# Patient Record
Sex: MALE | Race: WHITE | NOT HISPANIC OR LATINO | Employment: OTHER | URBAN - METROPOLITAN AREA
[De-identification: names, ages, dates, MRNs, and addresses within clinical notes are randomized per-mention and may not be internally consistent; named-entity substitution may affect disease eponyms.]

---

## 2017-02-07 ENCOUNTER — ALLSCRIPTS OFFICE VISIT (OUTPATIENT)
Dept: OTHER | Facility: OTHER | Age: 72
End: 2017-02-07

## 2017-02-07 DIAGNOSIS — Z13.6 ENCOUNTER FOR SCREENING FOR CARDIOVASCULAR DISORDERS: ICD-10-CM

## 2017-02-07 DIAGNOSIS — Z12.5 ENCOUNTER FOR SCREENING FOR MALIGNANT NEOPLASM OF PROSTATE: ICD-10-CM

## 2017-02-07 DIAGNOSIS — I10 ESSENTIAL (PRIMARY) HYPERTENSION: ICD-10-CM

## 2017-02-15 ENCOUNTER — APPOINTMENT (OUTPATIENT)
Dept: LAB | Facility: HOSPITAL | Age: 72
End: 2017-02-15
Attending: FAMILY MEDICINE
Payer: COMMERCIAL

## 2017-02-15 ENCOUNTER — TRANSCRIBE ORDERS (OUTPATIENT)
Dept: ADMINISTRATIVE | Facility: HOSPITAL | Age: 72
End: 2017-02-15

## 2017-02-15 DIAGNOSIS — I10 ESSENTIAL (PRIMARY) HYPERTENSION: ICD-10-CM

## 2017-02-15 DIAGNOSIS — Z13.6 ENCOUNTER FOR SCREENING FOR CARDIOVASCULAR DISORDERS: ICD-10-CM

## 2017-02-15 DIAGNOSIS — Z12.5 ENCOUNTER FOR SCREENING FOR MALIGNANT NEOPLASM OF PROSTATE: ICD-10-CM

## 2017-02-15 LAB
ALBUMIN SERPL BCP-MCNC: 3.6 G/DL (ref 3.5–5)
ALP SERPL-CCNC: 72 U/L (ref 46–116)
ALT SERPL W P-5'-P-CCNC: 38 U/L (ref 12–78)
ANION GAP SERPL CALCULATED.3IONS-SCNC: 10 MMOL/L (ref 4–13)
AST SERPL W P-5'-P-CCNC: 25 U/L (ref 5–45)
BILIRUB SERPL-MCNC: 0.8 MG/DL (ref 0.2–1)
BUN SERPL-MCNC: 17 MG/DL (ref 5–25)
CALCIUM SERPL-MCNC: 8.7 MG/DL (ref 8.3–10.1)
CHLORIDE SERPL-SCNC: 102 MMOL/L (ref 100–108)
CHOLEST SERPL-MCNC: 177 MG/DL (ref 50–200)
CO2 SERPL-SCNC: 28 MMOL/L (ref 21–32)
CREAT SERPL-MCNC: 1.06 MG/DL (ref 0.6–1.3)
ERYTHROCYTE [DISTWIDTH] IN BLOOD BY AUTOMATED COUNT: 13.2 % (ref 11.6–15.1)
GFR SERPL CREATININE-BSD FRML MDRD: >60 ML/MIN/1.73SQ M
GLUCOSE SERPL-MCNC: 120 MG/DL (ref 65–140)
HCT VFR BLD AUTO: 52.1 % (ref 36.5–49.3)
HDLC SERPL-MCNC: 39 MG/DL (ref 40–60)
HGB BLD-MCNC: 17.2 G/DL (ref 12–17)
LDLC SERPL CALC-MCNC: 107 MG/DL (ref 0–100)
MCH RBC QN AUTO: 31.5 PG (ref 26.8–34.3)
MCHC RBC AUTO-ENTMCNC: 33 G/DL (ref 31.4–37.4)
MCV RBC AUTO: 95 FL (ref 82–98)
PLATELET # BLD AUTO: 236 THOUSANDS/UL (ref 149–390)
PMV BLD AUTO: 9 FL (ref 8.9–12.7)
POTASSIUM SERPL-SCNC: 3.8 MMOL/L (ref 3.5–5.3)
PROT SERPL-MCNC: 7.7 G/DL (ref 6.4–8.2)
PSA SERPL-MCNC: 1 NG/ML (ref 0–4)
RBC # BLD AUTO: 5.46 MILLION/UL (ref 3.88–5.62)
SODIUM SERPL-SCNC: 140 MMOL/L (ref 136–145)
TRIGL SERPL-MCNC: 155 MG/DL
WBC # BLD AUTO: 9.24 THOUSAND/UL (ref 4.31–10.16)

## 2017-02-15 PROCEDURE — 85027 COMPLETE CBC AUTOMATED: CPT

## 2017-02-15 PROCEDURE — 80053 COMPREHEN METABOLIC PANEL: CPT

## 2017-02-15 PROCEDURE — 36415 COLL VENOUS BLD VENIPUNCTURE: CPT

## 2017-02-15 PROCEDURE — 80061 LIPID PANEL: CPT

## 2017-02-15 PROCEDURE — G0103 PSA SCREENING: HCPCS

## 2017-02-16 ENCOUNTER — GENERIC CONVERSION - ENCOUNTER (OUTPATIENT)
Dept: OTHER | Facility: OTHER | Age: 72
End: 2017-02-16

## 2017-03-07 ENCOUNTER — ALLSCRIPTS OFFICE VISIT (OUTPATIENT)
Dept: OTHER | Facility: OTHER | Age: 72
End: 2017-03-07

## 2017-04-24 ENCOUNTER — TRANSCRIBE ORDERS (OUTPATIENT)
Dept: ADMINISTRATIVE | Facility: HOSPITAL | Age: 72
End: 2017-04-24

## 2017-04-24 DIAGNOSIS — J32.8 OTHER CHRONIC SINUSITIS: Primary | ICD-10-CM

## 2017-04-27 ENCOUNTER — HOSPITAL ENCOUNTER (OUTPATIENT)
Dept: RADIOLOGY | Facility: HOSPITAL | Age: 72
Discharge: HOME | End: 2017-04-27
Payer: COMMERCIAL

## 2017-04-27 ENCOUNTER — HOSPITAL ENCOUNTER (OUTPATIENT)
Dept: CT IMAGING | Facility: HOSPITAL | Age: 72
Discharge: HOME | End: 2017-04-27
Payer: COMMERCIAL

## 2017-04-27 ENCOUNTER — TRANSCRIBE ORDERS (OUTPATIENT)
Dept: ADMINISTRATIVE | Facility: HOSPITAL | Age: 72
End: 2017-04-27

## 2017-04-27 DIAGNOSIS — J32.8 OTHER CHRONIC SINUSITIS: ICD-10-CM

## 2017-04-27 DIAGNOSIS — R05.9 COUGH: ICD-10-CM

## 2017-04-27 DIAGNOSIS — R05.9 COUGH: Primary | ICD-10-CM

## 2017-04-27 PROCEDURE — 71020 HB CHEST X-RAY 2VW FRONTAL&LATL: CPT

## 2017-04-27 PROCEDURE — 70486 CT MAXILLOFACIAL W/O DYE: CPT

## 2017-05-04 ENCOUNTER — TRANSCRIBE ORDERS (OUTPATIENT)
Dept: ADMINISTRATIVE | Facility: HOSPITAL | Age: 72
End: 2017-05-04

## 2017-05-04 DIAGNOSIS — R05.9 COUGH: Primary | ICD-10-CM

## 2017-05-17 ENCOUNTER — APPOINTMENT (OUTPATIENT)
Dept: LAB | Facility: HOSPITAL | Age: 72
End: 2017-05-17
Payer: COMMERCIAL

## 2017-05-17 ENCOUNTER — HOSPITAL ENCOUNTER (OUTPATIENT)
Dept: CT IMAGING | Facility: HOSPITAL | Age: 72
Discharge: HOME | End: 2017-05-17
Payer: COMMERCIAL

## 2017-05-17 ENCOUNTER — TRANSCRIBE ORDERS (OUTPATIENT)
Dept: ADMINISTRATIVE | Facility: HOSPITAL | Age: 72
End: 2017-05-17

## 2017-05-17 DIAGNOSIS — R05.9 COUGH: Primary | ICD-10-CM

## 2017-05-17 DIAGNOSIS — R05.9 COUGH: ICD-10-CM

## 2017-05-17 LAB
ANION GAP SERPL CALCULATED.3IONS-SCNC: 10 MMOL/L (ref 4–13)
BASOPHILS # BLD AUTO: 0.02 THOUSANDS/ΜL (ref 0–0.1)
BASOPHILS NFR BLD AUTO: 0 % (ref 0–1)
BUN SERPL-MCNC: 18 MG/DL (ref 5–25)
CALCIUM SERPL-MCNC: 9.3 MG/DL (ref 8.3–10.1)
CHLORIDE SERPL-SCNC: 105 MMOL/L (ref 100–108)
CO2 SERPL-SCNC: 27 MMOL/L (ref 21–32)
CREAT SERPL-MCNC: 0.84 MG/DL (ref 0.6–1.3)
EOSINOPHIL # BLD AUTO: 0.08 THOUSAND/ΜL (ref 0–0.61)
EOSINOPHIL NFR BLD AUTO: 1 % (ref 0–6)
ERYTHROCYTE [DISTWIDTH] IN BLOOD BY AUTOMATED COUNT: 13.6 % (ref 11.6–15.1)
GFR SERPL CREATININE-BSD FRML MDRD: >60 ML/MIN/1.73SQ M
GLUCOSE P FAST SERPL-MCNC: 159 MG/DL (ref 65–99)
HCT VFR BLD AUTO: 49.8 % (ref 36.5–49.3)
HGB BLD-MCNC: 16.7 G/DL (ref 12–17)
LYMPHOCYTES # BLD AUTO: 2.16 THOUSANDS/ΜL (ref 0.6–4.47)
LYMPHOCYTES NFR BLD AUTO: 33 % (ref 14–44)
MCH RBC QN AUTO: 31.8 PG (ref 26.8–34.3)
MCHC RBC AUTO-ENTMCNC: 33.5 G/DL (ref 31.4–37.4)
MCV RBC AUTO: 95 FL (ref 82–98)
MONOCYTES # BLD AUTO: 0.7 THOUSAND/ΜL (ref 0.17–1.22)
MONOCYTES NFR BLD AUTO: 11 % (ref 4–12)
NEUTROPHILS # BLD AUTO: 3.57 THOUSANDS/ΜL (ref 1.85–7.62)
NEUTS SEG NFR BLD AUTO: 55 % (ref 43–75)
NRBC BLD AUTO-RTO: 0 /100 WBCS
PLATELET # BLD AUTO: 239 THOUSANDS/UL (ref 149–390)
PMV BLD AUTO: 9.6 FL (ref 8.9–12.7)
POTASSIUM SERPL-SCNC: 3.4 MMOL/L (ref 3.5–5.3)
RBC # BLD AUTO: 5.25 MILLION/UL (ref 3.88–5.62)
SODIUM SERPL-SCNC: 142 MMOL/L (ref 136–145)
WBC # BLD AUTO: 6.55 THOUSAND/UL (ref 4.31–10.16)

## 2017-05-17 PROCEDURE — 80048 BASIC METABOLIC PNL TOTAL CA: CPT

## 2017-05-17 PROCEDURE — 71250 CT THORAX DX C-: CPT

## 2017-05-17 PROCEDURE — 36415 COLL VENOUS BLD VENIPUNCTURE: CPT

## 2017-05-17 PROCEDURE — 85025 COMPLETE CBC W/AUTO DIFF WBC: CPT

## 2017-05-17 PROCEDURE — 86615 BORDETELLA ANTIBODY: CPT

## 2017-05-19 LAB
B PERT IGA SER QL IA: <1 INDEX (ref 0–0.9)
B PERT IGG SER-ACNC: 1.32 INDEX (ref 0–0.94)
B PERT IGM SER QL IA: <1 INDEX (ref 0–0.9)

## 2017-06-01 ENCOUNTER — TRANSCRIBE ORDERS (OUTPATIENT)
Dept: ADMINISTRATIVE | Facility: HOSPITAL | Age: 72
End: 2017-06-01

## 2017-06-01 ENCOUNTER — ALLSCRIPTS OFFICE VISIT (OUTPATIENT)
Dept: OTHER | Facility: OTHER | Age: 72
End: 2017-06-01

## 2017-06-01 DIAGNOSIS — R05.9 COUGH: Primary | ICD-10-CM

## 2017-06-08 ENCOUNTER — HOSPITAL ENCOUNTER (OUTPATIENT)
Dept: PULMONOLOGY | Facility: HOSPITAL | Age: 72
Discharge: HOME | End: 2017-06-08
Attending: INTERNAL MEDICINE
Payer: COMMERCIAL

## 2017-06-08 DIAGNOSIS — R05.9 COUGH: ICD-10-CM

## 2017-06-08 PROCEDURE — 94729 DIFFUSING CAPACITY: CPT

## 2017-06-08 PROCEDURE — 94726 PLETHYSMOGRAPHY LUNG VOLUMES: CPT

## 2017-06-08 PROCEDURE — 94760 N-INVAS EAR/PLS OXIMETRY 1: CPT

## 2017-06-08 PROCEDURE — 94070 EVALUATION OF WHEEZING: CPT

## 2017-06-08 RX ORDER — ALBUTEROL SULFATE 2.5 MG/3ML
2.5 SOLUTION RESPIRATORY (INHALATION) ONCE AS NEEDED
Status: DISCONTINUED | OUTPATIENT
Start: 2017-06-08 | End: 2017-06-12 | Stop reason: HOSPADM

## 2017-06-08 RX ORDER — ALBUTEROL SULFATE 2.5 MG/3ML
2.5 SOLUTION RESPIRATORY (INHALATION) ONCE
Status: COMPLETED | OUTPATIENT
Start: 2017-06-08 | End: 2017-06-08

## 2017-06-08 RX ADMIN — ALBUTEROL SULFATE 2.5 MG: 2.5 SOLUTION RESPIRATORY (INHALATION) at 10:49

## 2017-06-08 RX ADMIN — METHACHOLINE CHLORIDE 5 BREATH: 100 POWDER, FOR SOLUTION RESPIRATORY (INHALATION) at 10:46

## 2017-06-21 ENCOUNTER — ALLSCRIPTS OFFICE VISIT (OUTPATIENT)
Dept: OTHER | Facility: OTHER | Age: 72
End: 2017-06-21

## 2017-08-02 ENCOUNTER — ALLSCRIPTS OFFICE VISIT (OUTPATIENT)
Dept: OTHER | Facility: OTHER | Age: 72
End: 2017-08-02

## 2017-08-02 ENCOUNTER — TRANSCRIBE ORDERS (OUTPATIENT)
Dept: ADMINISTRATIVE | Facility: HOSPITAL | Age: 72
End: 2017-08-02

## 2017-08-02 DIAGNOSIS — M54.81 OCCIPITAL NEURALGIA: ICD-10-CM

## 2017-08-02 DIAGNOSIS — Z12.11 ENCOUNTER FOR SCREENING FOR MALIGNANT NEOPLASM OF COLON: ICD-10-CM

## 2017-08-02 DIAGNOSIS — M54.81 OCCIPITAL NEURALGIA, UNSPECIFIED LATERALITY: Primary | ICD-10-CM

## 2017-08-22 ENCOUNTER — HOSPITAL ENCOUNTER (OUTPATIENT)
Dept: MRI IMAGING | Facility: HOSPITAL | Age: 72
Discharge: HOME | End: 2017-08-22
Attending: PSYCHIATRY & NEUROLOGY
Payer: COMMERCIAL

## 2017-08-22 DIAGNOSIS — M54.81 OCCIPITAL NEURALGIA: ICD-10-CM

## 2017-08-22 PROCEDURE — A9585 GADOBUTROL INJECTION: HCPCS | Performed by: PSYCHIATRY & NEUROLOGY

## 2017-08-22 PROCEDURE — 70553 MRI BRAIN STEM W/O & W/DYE: CPT

## 2017-08-22 PROCEDURE — 72156 MRI NECK SPINE W/O & W/DYE: CPT

## 2017-08-22 RX ADMIN — GADOBUTROL 9 ML: 604.72 INJECTION INTRAVENOUS at 16:07

## 2017-08-30 ENCOUNTER — GENERIC CONVERSION - ENCOUNTER (OUTPATIENT)
Dept: OTHER | Facility: OTHER | Age: 72
End: 2017-08-30

## 2017-09-11 ENCOUNTER — TRANSCRIBE ORDERS (OUTPATIENT)
Dept: ADMINISTRATIVE | Facility: HOSPITAL | Age: 72
End: 2017-09-11

## 2017-09-11 ENCOUNTER — GENERIC CONVERSION - ENCOUNTER (OUTPATIENT)
Dept: OTHER | Facility: OTHER | Age: 72
End: 2017-09-11

## 2017-09-11 DIAGNOSIS — M48.02 SPINAL STENOSIS IN CERVICAL REGION: Primary | ICD-10-CM

## 2017-09-11 DIAGNOSIS — M48.02 SPINAL STENOSIS OF CERVICAL REGION: ICD-10-CM

## 2017-09-15 ENCOUNTER — HOSPITAL ENCOUNTER (OUTPATIENT)
Dept: RADIOLOGY | Facility: HOSPITAL | Age: 72
Discharge: HOME | End: 2017-09-15
Payer: COMMERCIAL

## 2017-09-15 ENCOUNTER — HOSPITAL ENCOUNTER (OUTPATIENT)
Dept: CT IMAGING | Facility: HOSPITAL | Age: 72
Discharge: HOME | End: 2017-09-15
Payer: COMMERCIAL

## 2017-09-15 DIAGNOSIS — M48.02 SPINAL STENOSIS OF CERVICAL REGION: ICD-10-CM

## 2017-09-15 DIAGNOSIS — M48.02 SPINAL STENOSIS IN CERVICAL REGION: ICD-10-CM

## 2017-09-15 PROCEDURE — 72125 CT NECK SPINE W/O DYE: CPT

## 2017-09-15 PROCEDURE — 72050 X-RAY EXAM NECK SPINE 4/5VWS: CPT

## 2017-09-25 ENCOUNTER — GENERIC CONVERSION - ENCOUNTER (OUTPATIENT)
Dept: OTHER | Facility: OTHER | Age: 72
End: 2017-09-25

## 2017-10-20 ENCOUNTER — GENERIC CONVERSION - ENCOUNTER (OUTPATIENT)
Dept: OTHER | Facility: OTHER | Age: 72
End: 2017-10-20

## 2017-11-15 ENCOUNTER — GENERIC CONVERSION - ENCOUNTER (OUTPATIENT)
Dept: OTHER | Facility: OTHER | Age: 72
End: 2017-11-15

## 2017-11-21 ENCOUNTER — ALLSCRIPTS OFFICE VISIT (OUTPATIENT)
Dept: OTHER | Facility: OTHER | Age: 72
End: 2017-11-21

## 2017-11-22 NOTE — PROGRESS NOTES
Assessment    1  Benign essential hypertension (401 1) (I10)   2  Headache (784 0) (R51)    Plan  Benign essential hypertension    · AmLODIPine Besylate 5 MG Oral Tablet; TAKE ONE TABLET BY MOUTH ONCEDAILY   · Follow-up visit in 6 weeks Evaluation and Treatment  Follow-up  Status: Hold For -Scheduling  Requested for: 21Nov2017  Headache    · TraMADol HCl - 50 MG Oral Tablet; TAKE 1 TABLET 4 TIMES DAILY AS NEEDEDFOR PAIN    Discussion/Summary  The patient was counseled regarding diagnostic results,-- instructions for management,-- prognosis  Possible side effects of new medications were reviewed with the patient/guardian today  The treatment plan was reviewed with the patient/guardian  The patient/guardian understands and agrees with the treatment plan      Chief Complaint  Patient is in the office today complaining of having an elevated BP  Patient's BP has been 177/98, 168/96, 168/102 for the past few days  But the high BP has been going on for the past 2 months      Advance Directives  Advance Directive Janine McMillan:  NO - Patient does not have an advance health care directive  History of Present Illness  Hypertension (Follow-Up): The patient presents for follow-up of essential hypertension  He has no comorbid illnesses  He has no significant interval events  Symptoms:  Home monitoring: The patient checks his blood pressure regularly  Blood pressure control has been poor  Medications: The patient is not currently on any medications for his hypertension--lifestyle modification only  Headache (Follow-Up): The patient is being seen for follow-up of headache  The patient reports doing poorly  He has had no significant interval events  Additional history: did have epidural injections which he has not noticed any change   Review of Systems   Constitutional: no fever or chills, feels well, no tiredness, no recent weight loss or weight gain    ENT: no complaints of earache, no loss of hearing, no nosebleeds or nasal discharge, no sore throat or hoarseness  Cardiovascular: no complaints of slow or fast heart rate, no chest pain, no palpitations, no leg claudication or lower extremity edema  Respiratory: no complaints of shortness of breath, no wheezing or cough, no dyspnea on exertion, no orthopnea or PND  Gastrointestinal: no complaints of abdominal pain, no constipation, no nausea or vomiting, no diarrhea or bloody stools  Genitourinary: no complaints of dysuria or incontinence, no hesitancy, no nocturia, no genital lesion, no inadequacy of penile erection  Neurological: as noted in HPI  Active Problems  1  Allergic rhinitis (477 9) (J30 9)   2  Arthritis (716 90) (M19 90)   3  Benign essential hypertension (401 1) (I10)   4  Cataract (366 9) (H26 9)   5  Cervical radiculitis (723 4) (M54 12)   6  Cervical spinal stenosis (723 0) (M48 02)   7  Colon cancer screening (V76 51) (Z12 11)   8  Cough (786 2) (R05)   9  GERD (gastroesophageal reflux disease) (530 81) (K21 9)   10  Headache (784 0) (R51)   11  Nonallopathic lesion of occipitocervical region (739 0) (M99 9)   12  Occipital neuralgia of left side (723 8) (M54 81)   13  Prostate cancer screening (V76 44) (Z12 5)   14  Screening for cardiovascular condition (V81 2) (Z13 6)   15  Screening for diabetes mellitus (V77 1) (Z13 1)   16  Screening for genitourinary condition (V81 6) (Z13 89)   17  Sinusitis (473 9) (J32 9)   18  Sleep disturbances (780 50) (G47 9)   19  Trigeminal neuralgia (350 1) (G50 0)   20  Trigeminal neuropathy (350 9) (G50 9)    Past Medical History  1  History of renal calculi (V13 01) (K96 040)    Family History  Mother    1  Family history of cardiac disorder (V17 49) (Z82 49)   2  Family history of cerebrovascular accident (CVA) (V17 1) (Z82 3)   3  Denied: Family history of mental disorder   4  Denied: Family history of substance abuse  Father    5  Family history of malignant neoplasm (V16 9) (Z80 9)  Sister    6   Family history of diabetes mellitus (V18 0) (Z83 3)    Social History   · Always uses seat belt   · Does not use illicit drugs (V70 33) (Z78 9)   · Education Level: Post graduate   · Exercises regularly   · Lives with spouse   ·    · Never a smoker   · Primary spoken language English   · Retired   · Three children    Surgical History    1  History of Cervical Vertebral Fusion   2  History of Gallbladder Surgery   3  History of Kidney Surgery   4  History of Prostate Surgery    Current Meds   1  Chondroitin Sulfate 150 MG Oral Capsule; take 1 capsule daily; Therapy: [id number] to Recorded   2  Co Q-10 CAPS; TAKE 1 CAPSULE DAILY WITH A MEAL; Therapy: (Recorded:01Jun2017) to Recorded   3  Gabapentin 300 MG Oral Capsule; Therapy: (Pepe [place]) to Recorded   4  Glucosamine 750 MG Oral Tablet; Take 1 tablet daily; Therapy: [id number] to Recorded   5  Milk Thistle CAPS; take 1 capsule daily; Therapy: (Recorded:01Jun2017) to Recorded   6  Multi-Vitamins TABS; TAKE 1 TABLET DAILY; Therapy: (Recorded:01Jun2017) to Recorded   7  Probiotic CAPS; TAKE 1 CAPSULE TWICE DAILY; Therapy: (Recorded:01Jun2017) to Recorded   8  Turmeric CAPS; take 1 capsule daily; Therapy: (Recorded:01Jun2017) to Recorded   9  Vitamin D3 TABS; Take 1 tablet daily; Therapy: (Recorded:01Jun2017) to Recorded    Allergies  1  Cortizone-10 CREA   2  Gadolinium Derivatives   3  Bactrim TABS   4  [address] #3 POWD    Vitals   Recorded: [id number] 10:19AM   Heart Rate 787   Systolic 864   Diastolic 439   Height 5 ft 10 in   Weight 210 lb 6 oz   BMI Calculated 30 19   BSA Calculated 2 13   O2 Saturation 95       Physical Exam   Constitutional  General appearance: No acute distress, well appearing and well nourished  Ears, Nose, Mouth, and Throat  Oropharynx: Normal with no erythema, edema, exudate or lesions  Pulmonary  Auscultation of lungs: Clear to auscultation, equal breath sounds bilaterally, no wheezes, no rales, no rhonci     Cardiovascular  Auscultation of heart: Normal rate and rhythm, normal S1 and S2, without murmurs  Examination of extremities for edema and/or varicosities: Normal    Musculoskeletal  Gait and station: Normal    Neurologic  Cranial nerves: Cranial nerves 2-12 intact     Psychiatric  Orientation to person, place and time: Normal    Mood and affect: Normal          Signatures   Electronically signed by : Bryce Mcdonald DO; Nov 21 2017 10:36AM EST                       (Author)

## 2017-12-12 ENCOUNTER — GENERIC CONVERSION - ENCOUNTER (OUTPATIENT)
Dept: OTHER | Facility: OTHER | Age: 72
End: 2017-12-12

## 2017-12-12 DIAGNOSIS — Z12.5 ENCOUNTER FOR SCREENING FOR MALIGNANT NEOPLASM OF PROSTATE: ICD-10-CM

## 2017-12-12 DIAGNOSIS — I10 ESSENTIAL (PRIMARY) HYPERTENSION: ICD-10-CM

## 2017-12-15 ENCOUNTER — GENERIC CONVERSION - ENCOUNTER (OUTPATIENT)
Dept: OTHER | Facility: OTHER | Age: 72
End: 2017-12-15

## 2017-12-15 ENCOUNTER — GENERIC CONVERSION - ENCOUNTER (OUTPATIENT)
Dept: FAMILY MEDICINE CLINIC | Facility: CLINIC | Age: 72
End: 2017-12-15

## 2018-01-12 VITALS
HEART RATE: 111 BPM | OXYGEN SATURATION: 95 % | DIASTOLIC BLOOD PRESSURE: 104 MMHG | HEIGHT: 70 IN | WEIGHT: 210.38 LBS | SYSTOLIC BLOOD PRESSURE: 176 MMHG | BODY MASS INDEX: 30.12 KG/M2

## 2018-01-12 VITALS
HEIGHT: 70 IN | DIASTOLIC BLOOD PRESSURE: 92 MMHG | WEIGHT: 208 LBS | BODY MASS INDEX: 29.78 KG/M2 | SYSTOLIC BLOOD PRESSURE: 142 MMHG | HEART RATE: 102 BPM

## 2018-01-12 NOTE — RESULT NOTES
Verified Results  (1) COMPREHENSIVE METABOLIC PANEL 74CFR8414 99:40RZ [facility] [employer] Order Number: [id number]     Test Name Result Flag Reference   GLUCOSE,RANDM 120 mg/dL     If the patient is fasting, the ADA then defines impaired fasting glucose as > 100 mg/dL and diabetes as > or equal to 123 mg/dL  SODIUM 140 mmol/L  136-145   POTASSIUM 3 8 mmol/L  3 5-5 3   CHLORIDE 102 mmol/L  100-108   CARBON DIOXIDE 28 mmol/L  21-32   ANION GAP (CALC) 10 mmol/L  4-13   BLOOD UREA NITROGEN 17 mg/dL  5-25   CREATININE 1 06 mg/dL  0 60-1 30   Standardized to IDMS reference method   CALCIUM 8 7 mg/dL  8 3-10 1   BILI, TOTAL 0 80 mg/dL  0 20-1 00   ALK PHOSPHATAS 72 U/L     ALT (SGPT) 38 U/L  12-78   AST(SGOT) 25 U/L  5-45   ALBUMIN 3 6 g/dL  3 5-5 0   TOTAL PROTEIN 7 7 g/dL  6 4-8 2   eGFR Non-African American      >60 0 ml/min/1 73sq m   - Patient Instructions: This is a fasting blood test  Water,black tea or black  coffee only after 9:00pm the night before test Drink 2 glasses of water the morning of test   National Kidney Disease Education Program recommendations are as follows:  GFR calculation is accurate only with a steady state creatinine  Chronic Kidney disease less than 60 ml/min/1 73 sq  meters  Kidney failure less than 15 ml/min/1 73 sq  meters  (1) LIPID PANEL, FASTING [id number] 12:14PM [facility] [employer] Order Number: [id number]     Test Name Result Flag Reference   CHOLESTEROL 177 mg/dL     HDL,DIRECT 39 mg/dL L 40-60   Specimen collection should occur prior to Metamizole administration due to the potential for falsely depressed results  LDL CHOLESTEROL CALCULATED 107 mg/dL H 0-100   - Patient Instructions: This is a fasting blood test  Water,black tea or black  coffee only after 9:00pm the night before test   Drink 2 glasses of water the morning of test     - Patient Instructions:  This is a fasting blood test  Water,black tea or black  coffee only after 9:00pm the night before test Drink 2 glasses of water the morning of test   Triglyceride:         Normal              <150 mg/dl       Borderline High    150-199 mg/dl       High               200-499 mg/dl       Very High          >499 mg/dl  Cholesterol:         Desirable        <200 mg/dl      Borderline High  200-239 mg/dl      High             >239 mg/dl  HDL Cholesterol:        High    >59 mg/dL      Low     <41 mg/dL  LDL CALCULATED:    This screening LDL is a calculated result  It does not have the accuracy of the Direct Measured LDL in the monitoring of patients with hyperlipidemia and/or statin therapy  Direct Measure LDL (UFB378) must be ordered separately in these patients  TRIGLYCERIDES 155 mg/dL H <=150   Specimen collection should occur prior to N-Acetylcysteine or Metamizole administration due to the potential for falsely depressed results  (1) CBC/ PLT (NO DIFF) [id number] 12:14PM [employer] Order Number: [id number]     Test Name Result Flag Reference   HEMATOCRIT 52 1 % H 36 5-49 3   HEMOGLOBIN 17 2 g/dL H 12 0-17 0   MCHC 33 0 g/dL  31 4-37 4   MCH 31 5 pg  26 8-34 3   MCV 95 fL  82-98   PLATELET COUNT 957 Thousands/uL  149-390   RBC COUNT 5 46 Million/uL  3 88-5 62   RDW 13 2 %  11 6-15 1   WBC COUNT 9 24 Thousand/uL  4 31-10 16   MPV 9 0 fL  8 9-12 7     (1) PSA (SCREEN) (Dx V76 44 Screen for Prostate Cancer) [id number] 12:14PM [employer] Order Number: [id number]     Test Name Result Flag Reference   PROSTATE SPECIFIC ANTIGEN 1 0 ng/mL  0 0-4 0   American Urological Association Guidelines define biochemical recurrence of prostate cancer as a detectable or rising PSA value post-radical prostatectomy that is greater than or equal to 0 2 ng/mL with a second confirmatory level of greater than or equal to 0 2 ng/mL  - Patient Instructions: This test is non-fasting  Please drink two glasses of water morning of bloodwork  - Patient Instructions:  This test is non-fasting  Please drink two glasses of water morning of bloodwork

## 2018-01-13 VITALS
DIASTOLIC BLOOD PRESSURE: 86 MMHG | WEIGHT: 207 LBS | HEART RATE: 102 BPM | HEIGHT: 72 IN | OXYGEN SATURATION: 96 % | SYSTOLIC BLOOD PRESSURE: 138 MMHG | BODY MASS INDEX: 28.04 KG/M2

## 2018-01-13 VITALS
HEART RATE: 94 BPM | TEMPERATURE: 98.1 F | WEIGHT: 207 LBS | BODY MASS INDEX: 28.04 KG/M2 | HEIGHT: 72 IN | DIASTOLIC BLOOD PRESSURE: 82 MMHG | OXYGEN SATURATION: 96 % | SYSTOLIC BLOOD PRESSURE: 136 MMHG

## 2018-01-13 VITALS
BODY MASS INDEX: 28.5 KG/M2 | OXYGEN SATURATION: 94 % | RESPIRATION RATE: 16 BRPM | HEIGHT: 72 IN | WEIGHT: 210.38 LBS | TEMPERATURE: 97.9 F | DIASTOLIC BLOOD PRESSURE: 98 MMHG | SYSTOLIC BLOOD PRESSURE: 144 MMHG | HEART RATE: 103 BPM

## 2018-01-14 VITALS
BODY MASS INDEX: 28.34 KG/M2 | TEMPERATURE: 97.9 F | SYSTOLIC BLOOD PRESSURE: 140 MMHG | HEIGHT: 72 IN | DIASTOLIC BLOOD PRESSURE: 80 MMHG | RESPIRATION RATE: 16 BRPM | WEIGHT: 209.25 LBS | OXYGEN SATURATION: 94 % | HEART RATE: 98 BPM

## 2018-01-22 VITALS
RESPIRATION RATE: 16 BRPM | DIASTOLIC BLOOD PRESSURE: 77 MMHG | SYSTOLIC BLOOD PRESSURE: 132 MMHG | BODY MASS INDEX: 29.92 KG/M2 | WEIGHT: 209 LBS | HEIGHT: 70 IN | HEART RATE: 107 BPM | TEMPERATURE: 98.1 F

## 2018-01-22 VITALS
HEART RATE: 111 BPM | TEMPERATURE: 98 F | SYSTOLIC BLOOD PRESSURE: 130 MMHG | BODY MASS INDEX: 29.78 KG/M2 | HEIGHT: 70 IN | WEIGHT: 208 LBS | DIASTOLIC BLOOD PRESSURE: 78 MMHG | RESPIRATION RATE: 16 BRPM

## 2018-01-22 VITALS
DIASTOLIC BLOOD PRESSURE: 102 MMHG | HEART RATE: 99 BPM | WEIGHT: 209 LBS | HEIGHT: 70 IN | SYSTOLIC BLOOD PRESSURE: 164 MMHG | BODY MASS INDEX: 29.92 KG/M2 | OXYGEN SATURATION: 98 %

## 2018-01-24 VITALS
HEART RATE: 105 BPM | OXYGEN SATURATION: 93 % | TEMPERATURE: 97.6 F | DIASTOLIC BLOOD PRESSURE: 72 MMHG | RESPIRATION RATE: 16 BRPM | BODY MASS INDEX: 29.92 KG/M2 | HEIGHT: 70 IN | SYSTOLIC BLOOD PRESSURE: 158 MMHG | WEIGHT: 209 LBS

## 2018-01-25 ENCOUNTER — EVALUATION (OUTPATIENT)
Dept: PHYSICAL THERAPY | Facility: CLINIC | Age: 73
End: 2018-01-25
Payer: COMMERCIAL

## 2018-01-25 DIAGNOSIS — M54.81 OCCIPITAL NEURALGIA OF LEFT SIDE: Primary | ICD-10-CM

## 2018-01-25 PROCEDURE — 97162 PT EVAL MOD COMPLEX 30 MIN: CPT | Performed by: PHYSICAL THERAPIST

## 2018-01-25 PROCEDURE — 97110 THERAPEUTIC EXERCISES: CPT | Performed by: PHYSICAL THERAPIST

## 2018-01-25 PROCEDURE — G8981 BODY POS CURRENT STATUS: HCPCS | Performed by: PHYSICAL THERAPIST

## 2018-01-25 PROCEDURE — 97140 MANUAL THERAPY 1/> REGIONS: CPT | Performed by: PHYSICAL THERAPIST

## 2018-01-25 PROCEDURE — G8982 BODY POS GOAL STATUS: HCPCS | Performed by: PHYSICAL THERAPIST

## 2018-01-25 RX ORDER — GABAPENTIN 600 MG/1
300 TABLET ORAL 3 TIMES DAILY
COMMUNITY
End: 2018-08-27

## 2018-01-25 RX ORDER — DULOXETIN HYDROCHLORIDE 60 MG/1
20 CAPSULE, DELAYED RELEASE ORAL DAILY
COMMUNITY
End: 2018-08-27

## 2018-01-25 NOTE — LETTER
2018    [facility] [address]    Patient: Jefferson Gonzales   YOB: 1945   Date of Visit: 2018       Dear Dr Babs Lucas:    Please review the attached summary of Joe Saucedo progress and our plan for continued therapy, and verify that you agree therapy should continue by signing the attached document and sending it back to our office  If you have any questions or concerns, please don't hesitate to call  Sincerely,        Gianfranco Mackenzie, PT          CC: No Recipients            PT Evaluation     Today's date: 2018  Patient name: Jefferson Gonzales  :   MRN: [id number]  Referring provider: Roselia Del Angel MD  Dx:   Encounter Diagnosis   Name Primary?  Occipital neuralgia of left side Yes                  Assessment  Impairments: abnormal muscle tone and abnormal or restricted ROM    Assessment details: Patient is a 81year old male presenting with left sided suboccipital pain as well as frequent left sides headaches  He is currently demonstrating contributing factors of decreased cervical mobility, increased suboccipital muscle tone and increased pain  Patient will benefit from physical therapy in order to address the deficits contributing to functional limitations and facilitate return to prior level of functioning  Patient was provided a home exercise program and demonstrated an understanding of exercises  Patient was advised to stop performing home exercise program if symptoms increase or new complaints developed  Verbal understanding demonstrated regarding home exercise program instructions  Patient was educated on and agreeable to plan of care     Understanding of Dx/Px/POC: good   Prognosis: good    Goals  Short term goals:  1) Patient will demonstrate improved left cervical rotation > 50 degrees with pain <2/10  in 4 weeks  2) Patient will demonstrate no tenderness to palpation of left suboccipitals in 4 weeks    Long term goals:  1) Patient will demonstrate ability to look over shoulder in order to check blind spot while driving with cervical pain <2/10 in 6 weeks  2) Patient will demonstrate ability to read marcel for > 30 minutes without cervical pain in 6 weeks  3) Patient will demonstrate ability to go without headaches for > 3 days at a time in 6 weeks      Plan  Patient would benefit from: skilled PT  Planned modality interventions: cryotherapy  Planned therapy interventions: manual therapy, functional ROM exercises, therapeutic exercise, therapeutic activities, stretching, strengthening, flexibility, coordination, balance, patient education, massage, neuromuscular re-education, home exercise program and graded exercise  Frequency: 2x week  Duration in weeks: 6  Treatment plan discussed with: patient        Subjective Evaluation    History of Present Illness  Mechanism of injury: Patient reports that these current symptoms started in December 2016  Patient reports that the pain started behind his left eye  Patient reports that the original thought the symptoms were from his sinuses  He received imaging that revealed no significant findings  He was then referred to to a pulmonologist that did not have any significant findings  Patient reports that he was referred to a neurologist who took imaging that revealed 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  This was not believed to be the cause of symptoms  Patient reports that the physician had recommended fusion of all  cervical vertebrate  A second opinion said that C1-2 fusion might be an option  He states that he has had 3 injection on the area on the facet C3-C4- no relief  He has also trilaed injection in occipital nerve  Helped from reducing pain from 7/10 to 3/10 for two weeks   Patient had this injection repeated that took pain from 6/10 to 4/10  Patient reports that his symptoms have progressively worsened over the last 4-5 months  Patient reports that he has not received any physical therapy for recent onset of symptoms  Patient reports that he has not been completing any self management of symptoms  Quality of life: fair    Pain  No pain reported  Current pain ratin  At best pain ratin  At worst pain ratin  Location: Left suboccipital region, superior aspect of head, left sided headache  Quality: sharp    Social Support  Steps to enter house: yes (No handrail)  4  Stairs in house: yes (ahndrial on left)   5  Lives in: multiple-level home    not workingExercise history: Patient reports that he likes to go down to his house on at the [place]  Life stress: Patient reports that he has been lavoiding lifting  Diagnostic Tests  X-ray: abnormal  MRI studies: abnormal    FCE comments: Patient reports that he has a synovial cyst in upper cervical spineTreatments  No previous or current treatments  Patient Goals  Patient goals for therapy: decreased pain  Patient goal: Get back to completing household chores/projects        Objective     Special Questions  Patient is experiencing dizziness, headaches and tinnitus  Additional Special Questions  Had tinnitus for years     Postural Observations  Seated posture: fair  Standing posture: fair        Palpation     Additional Palpation Details  Palpation of (r) suboccipital musculature- No Effect on symptoms  Palpation of (l) Rectus Capitus Superior Major-No Effect, Rectus capitis posterior minor- no effect Obliquus capitis superior- Abolished headache an suboccipital pain      Neurological Testing     Sensation   Cervical/Thoracic   Left   Intact: light touch    Right   Intact: light touch    Reflexes   Left   Biceps (C5/C6): normal (2+)  Brachioradialis (C6): normal (2+)  Triceps (C7): normal (2+)    Right   Biceps (C5/C6): normal (2+)  Brachioradialis (C6): normal (2+)  Triceps (C7): trace (1+)    Additional Neurological Details  Upper extremity myotomes WNL and equal bilaterally     Active Range of Motion   Cervical/Thoracic Spine   Cervical    Flexion: 39 degrees   Extension: 38 degrees   Left rotation: 30 degrees   Right rotation: 60 degrees     General Comments     Spine Comments  Patient denies numbness and tingling in face or lips, difficulty swallowing, feels occasionally off balance    Sharp-pusor (negative)       Precautions: Cervical Fusion (C5,C6, C7 25 years ago), hypertension  Daily Treatment Diary     Manual  1/25/2018            SOR TK                                                                    Exercise Diary  1/25/2018            Upper Cervical Nod Supine (no chin tucks)  15            Self suboccipital release with tennis ball 15                                                                                                                                                                                                                                                          Modalities                                                                           Based upon review of the patient's progress and continued therapy plan, it is my medical opinion that Gely Jhaveri should continue physical therapy treatment at the [facility]:

## 2018-01-25 NOTE — PROGRESS NOTES
PT Evaluation     Today's date: 2018  Patient name: Fracisco Orozco  :   MRN: [id number]  Referring provider: Cristela Knight MD  Dx:   Encounter Diagnosis   Name Primary?  Occipital neuralgia of left side Yes                  Assessment  Impairments: abnormal muscle tone and abnormal or restricted ROM    Assessment details: Patient is a 81year old male presenting with left sided suboccipital pain as well as frequent left sides headaches  He is currently demonstrating contributing factors of decreased cervical mobility, increased suboccipital muscle tone and increased pain  Patient will benefit from physical therapy in order to address the deficits contributing to functional limitations and facilitate return to prior level of functioning  Patient was provided a home exercise program and demonstrated an understanding of exercises  Patient was advised to stop performing home exercise program if symptoms increase or new complaints developed  Verbal understanding demonstrated regarding home exercise program instructions  Patient was educated on and agreeable to plan of care     Understanding of Dx/Px/POC: good   Prognosis: good    Goals  Short term goals:  1) Patient will demonstrate improved left cervical rotation > 50 degrees with pain <2/10  in 4 weeks  2) Patient will demonstrate no tenderness to palpation of left suboccipitals in 4 weeks    Long term goals:  1) Patient will demonstrate ability to look over shoulder in order to check blind spot while driving with cervical pain <2/10 in 6 weeks  2) Patient will demonstrate ability to read marcel for > 30 minutes without cervical pain in 6 weeks  3) Patient will demonstrate ability to go without headaches for > 3 days at a time in 6 weeks      Plan  Patient would benefit from: skilled PT  Planned modality interventions: cryotherapy  Planned therapy interventions: manual therapy, functional ROM exercises, therapeutic exercise, therapeutic activities, stretching, strengthening, flexibility, coordination, balance, patient education, massage, neuromuscular re-education, home exercise program and graded exercise  Frequency: 2x week  Duration in weeks: 6  Treatment plan discussed with: patient        Subjective Evaluation    History of Present Illness  Mechanism of injury: Patient reports that these current symptoms started in 2016  Patient reports that the pain started behind his left eye  Patient reports that the original thought the symptoms were from his sinuses  He received imaging that revealed no significant findings  He was then referred to to a pulmonologist that did not have any significant findings  Patient reports that he was referred to a neurologist who took imaging that revealed 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  This was not believed to be the cause of symptoms  Patient reports that the physician had recommended fusion of all  cervical vertebrate  A second opinion said that C1-2 fusion might be an option  He states that he has had 3 injection on the area on the facet C3-C4- no relief  He has also trilaed injection in occipital nerve  Helped from reducing pain from 7/10 to 3/10 for two weeks   Patient had this injection repeated that took pain from 6/10 to 4/10  Patient reports that his symptoms have progressively worsened over the last 4-5 months  Patient reports that he has not received any physical therapy for recent onset of symptoms  Patient reports that he has not been completing any self management of symptoms    Quality of life: fair    Pain  No pain reported  Current pain ratin  At best pain ratin  At worst pain ratin  Location: Left suboccipital region, superior aspect of head, left sided headache  Quality: sharp    Social Support  Steps to enter house: yes (No handrail)  4  Stairs in house: yes (ahndrial on left)   5  Lives in: multiple-level home    not workingExercise history: Patient reports that he likes to go down to his house on at the [facility]  Life stress: Patient reports that he has been lavoiding lifting  Diagnostic Tests  X-ray: abnormal  MRI studies: abnormal    FCE comments: Patient reports that he has a synovial cyst in upper cervical spineTreatments  No previous or current treatments  Patient Goals  Patient goals for therapy: decreased pain  Patient goal: Get back to completing household chores/projects        Objective     Special Questions  Patient is experiencing dizziness, headaches and tinnitus  Additional Special Questions  Had tinnitus for years     Postural Observations  Seated posture: fair  Standing posture: fair        Palpation     Additional Palpation Details  Palpation of (r) suboccipital musculature- No Effect on symptoms  Palpation of (l) Rectus Capitus Superior Major-No Effect, Rectus capitis posterior minor- no effect Obliquus capitis superior- Abolished headache an suboccipital pain      Neurological Testing     Sensation   Cervical/Thoracic   Left   Intact: light touch    Right   Intact: light touch    Reflexes   Left   Biceps (C5/C6): normal (2+)  Brachioradialis (C6): normal (2+)  Triceps (C7): normal (2+)    Right   Biceps (C5/C6): normal (2+)  Brachioradialis (C6): normal (2+)  Triceps (C7): trace (1+)    Additional Neurological Details  Upper extremity myotomes WNL and equal bilaterally     Active Range of Motion   Cervical/Thoracic Spine   Cervical    Flexion: 39 degrees   Extension: 38 degrees   Left rotation: 30 degrees   Right rotation: 60 degrees     General Comments     Spine Comments  Patient denies numbness and tingling in face or lips, difficulty swallowing, feels occasionally off balance    Sharp-pusor (negative)       Precautions: Cervical Fusion (C5,C6, C7 25 years ago), hypertension  Daily Treatment Diary     Manual  1/25/2018            SOR TK                                                                    Exercise Diary  1/25/2018 Upper Cervical Nod Supine (no chin tucks)  15            Self suboccipital release with tennis ball 15                                                                                                                                                                                                                                                          Modalities

## 2018-01-25 NOTE — LETTER
2018    Chelsi Hammond [facility] [id number]    Patient: Wendy Rick   YOB: 1945   Date of Visit: 2018       Dear Dr Estee Cano:    Please review the attached summary of Billie Snow progress and our plan for continued therapy, and verify that you agree therapy should continue by signing the attached document and sending it back to our office  If you have any questions or concerns, please don't hesitate to call  Sincerely,        Rizwan Munoz, PT          CC: No Recipients            PT Evaluation     Today's date: 2018  Patient name: Wendy Rick  : [id number]  MRN: [id number]  Referring provider: Sadie Armas MD  Dx:   Encounter Diagnosis   Name Primary?  Occipital neuralgia of left side Yes                  Assessment  Impairments: abnormal muscle tone and abnormal or restricted ROM    Assessment details: Patient is a 81year old male presenting with left sided suboccipital pain as well as frequent left sides headaches  He is currently demonstrating contributing factors of decreased cervical mobility, increased suboccipital muscle tone and increased pain  Patient will benefit from physical therapy in order to address the deficits contributing to functional limitations and facilitate return to prior level of functioning  Patient was provided a home exercise program and demonstrated an understanding of exercises  Patient was advised to stop performing home exercise program if symptoms increase or new complaints developed  Verbal understanding demonstrated regarding home exercise program instructions  Patient was educated on and agreeable to plan of care     Understanding of Dx/Px/POC: good   Prognosis: good    Goals  Short term goals:  1) Patient will demonstrate improved left cervical rotation > 50 degrees with pain <2/10  in 4 weeks  2) Patient will demonstrate no tenderness to palpation of left suboccipitals in 4 weeks    Long term goals:  1) Patient will demonstrate ability to look over shoulder in order to check blind spot while driving with cervical pain <2/10 in 6 weeks  2) Patient will demonstrate ability to read marcel for > 30 minutes without cervical pain in 6 weeks  3) Patient will demonstrate ability to go without headaches for > 3 days at a time in 6 weeks      Plan  Patient would benefit from: skilled PT  Planned modality interventions: cryotherapy  Planned therapy interventions: manual therapy, functional ROM exercises, therapeutic exercise, therapeutic activities, stretching, strengthening, flexibility, coordination, balance, patient education, massage, neuromuscular re-education, home exercise program and graded exercise  Frequency: 2x week  Duration in weeks: 6  Treatment plan discussed with: patient        Subjective Evaluation    History of Present Illness  Mechanism of injury: Patient reports that these current symptoms started in December 2016  Patient reports that the pain started behind his left eye  Patient reports that the original thought the symptoms were from his sinuses  He received imaging that revealed no significant findings  He was then referred to to a pulmonologist that did not have any significant findings  Patient reports that he was referred to a neurologist who took imaging that revealed 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  This was not believed to be the cause of symptoms  Patient reports that the physician had recommended fusion of all  cervical vertebrate  A second opinion said that C1-2 fusion might be an option  He states that he has had 3 injection on the area on the facet C3-C4- no relief  He has also trilaed injection in occipital nerve  Helped from reducing pain from 7/10 to 3/10 for two weeks   Patient had this injection repeated that took pain from 6/10 to 4/10  Patient reports that his symptoms have progressively worsened over the last 4-5 months  Patient reports that he has not received any physical therapy for recent onset of symptoms  Patient reports that he has not been completing any self management of symptoms  Quality of life: fair    Pain  No pain reported  Current pain ratin  At best pain ratin  At worst pain ratin  Location: Left suboccipital region, superior aspect of head, left sided headache  Quality: sharp    Social Support  Steps to enter house: yes (No handrail)  4  Stairs in house: yes (ahndrial on left)   5  Lives in: multiple-level home    not workingExercise history: Patient reports that he likes to go down to his house on at the [place]  Life stress: Patient reports that he has been lavoiding lifting  Diagnostic Tests  X-ray: abnormal  MRI studies: abnormal    FCE comments: Patient reports that he has a synovial cyst in upper cervical spineTreatments  No previous or current treatments  Patient Goals  Patient goals for therapy: decreased pain  Patient goal: Get back to completing household chores/projects        Objective     Special Questions  Patient is experiencing dizziness, headaches and tinnitus  Additional Special Questions  Had tinnitus for years     Postural Observations  Seated posture: fair  Standing posture: fair        Palpation     Additional Palpation Details  Palpation of (r) suboccipital musculature- No Effect on symptoms  Palpation of (l) Rectus Capitus Superior Major-No Effect, Rectus capitis posterior minor- no effect Obliquus capitis superior- Abolished headache an suboccipital pain      Neurological Testing     Sensation   Cervical/Thoracic   Left   Intact: light touch    Right   Intact: light touch    Reflexes   Left   Biceps (C5/C6): normal (2+)  Brachioradialis (C6): normal (2+)  Triceps (C7): normal (2+)    Right   Biceps (C5/C6): normal (2+)  Brachioradialis (C6): normal (2+)  Triceps (C7): trace (1+)    Additional Neurological Details  Upper extremity myotomes WNL and equal bilaterally     Active Range of Motion   Cervical/Thoracic Spine   Cervical    Flexion: 39 degrees   Extension: 38 degrees   Left rotation: 30 degrees   Right rotation: 60 degrees     General Comments     Spine Comments  Patient denies numbness and tingling in face or lips, difficulty swallowing, feels occasionally off balance    Sharp-pusor (negative)       Precautions: Cervical Fusion (C5,C6, C7 25 years ago), hypertension  Daily Treatment Diary     Manual  1/25/2018            SOR TK                                                                    Exercise Diary  1/25/2018            Upper Cervical Nod Supine (no chin tucks)  15            Self suboccipital release with tennis ball 15                                                                                                                                                                                                                                                          Modalities                                                                           Based upon review of the patient's progress and continued therapy plan, it is my medical opinion that Aurelia Blair should continue physical therapy treatment at the [facility]:

## 2018-01-26 ENCOUNTER — TRANSCRIBE ORDERS (OUTPATIENT)
Dept: PHYSICAL THERAPY | Facility: CLINIC | Age: 73
End: 2018-01-26

## 2018-01-26 DIAGNOSIS — M54.2 CERVICALGIA: Primary | ICD-10-CM

## 2018-01-29 ENCOUNTER — OFFICE VISIT (OUTPATIENT)
Dept: PHYSICAL THERAPY | Facility: CLINIC | Age: 73
End: 2018-01-29
Payer: COMMERCIAL

## 2018-01-29 DIAGNOSIS — M54.81 OCCIPITAL NEURALGIA OF LEFT SIDE: Primary | ICD-10-CM

## 2018-01-29 PROCEDURE — 97140 MANUAL THERAPY 1/> REGIONS: CPT | Performed by: PHYSICAL THERAPIST

## 2018-01-29 PROCEDURE — 97110 THERAPEUTIC EXERCISES: CPT | Performed by: PHYSICAL THERAPIST

## 2018-01-30 NOTE — PROGRESS NOTES
Daily Note     Today's date: 2018  Patient name: Erin Owens  : [id number]  MRN: [id number]  Referring provider: Zoila Mackey MD  Dx:   Encounter Diagnosis   Name Primary?  Occipital neuralgia of left side Yes         Subjective: Patient reports that he did not have any pain following his initial evaluation until approximately 9 pm  He states that he has noticed that his pain is worse in the evening  Patient is currently reporting pain in his left suboccipital region as well as head ache in frontal region  Objective: See treatment diary below  Precautions: Cervical Fusion (C5,C6, C7 25 years ago), hypertension  Daily Treatment Diary     Manual  2018            SOR TK            Prone STM to (L) suboccipitals and SCM TK                                                       Exercise Diary  2018           Upper Cervical Nod Supine (no chin tucks)  15 15           Self suboccipital release with tennis ball 15 NP           FTW  20           No Monies  2 x 10           Theraband Rows  RTB 2 x 10           Theraband Extension  RTB 2 x 10                                                                                                                                                                                                     Modalities  2018            Ice Cervical Spine Supine 10                                          Assessment: Patient reported abolished pain in suboccipital region as well as frontal headache following manual SOR and STM  He was able to tolerate scapular strengthening without exacerbation of symptoms  Patient was educated on proper lumbrical  in order to have wife perform SOR later in the evening when symptoms seem to arise  He will benefit from continued PT in order to improve suboccipital muscle length  Plan: Continue per plan of care

## 2018-02-01 ENCOUNTER — OFFICE VISIT (OUTPATIENT)
Dept: PHYSICAL THERAPY | Facility: CLINIC | Age: 73
End: 2018-02-01
Payer: COMMERCIAL

## 2018-02-01 DIAGNOSIS — M54.81 OCCIPITAL NEURALGIA OF LEFT SIDE: Primary | ICD-10-CM

## 2018-02-01 PROCEDURE — 97140 MANUAL THERAPY 1/> REGIONS: CPT | Performed by: PHYSICAL THERAPIST

## 2018-02-01 PROCEDURE — 97110 THERAPEUTIC EXERCISES: CPT | Performed by: PHYSICAL THERAPIST

## 2018-02-01 NOTE — PROGRESS NOTES
Daily Note     Today's date: 2018  Patient name: Diana Tsang  :   MRN: [id number]  Referring provider: Ardis Baumgarten, MD  Dx:   Encounter Diagnosis   Name Primary?  Occipital neuralgia of left side Yes         Subjective: Patient reported that he did not have pain on his ride home following his last session, however had an increase in neck pain as well as forehead pain when turning left into his driveway  He reports that his is currently not having forehead pain but states that he is having 5/10 left suboccipital "soreness"      Objective: See treatment diary below  Precautions: Cervical Fusion (C5,C6, C7 25 years ago), hypertension  Daily Treatment Diary      Manual  2018                 SOR TK    TK                 Prone STM to (L) suboccipitals and SCM TK   TK                                                                                               Exercise Diary  2018                 Upper Cervical Nod Supine (no chin tucks)  15 15  NP                 Self suboccipital release with tennis ball 15 NP  NP                 FTW   20  20                 No Monies   2 x 10  2 x 10                 Theraband Rows   RTB 2 x 10  RTB 2 x 10                 Theraband Extension   RTB 2 x 10  RTB 2 x 10                  UBE     3 fwd/ 3 retro                                                                                                                                                                                                                                                                                                                                               Modalities  2018                   Ice Cervical Spine Supine 10  10                                                                       Assessment: Tolerated treatment fair  Patient continues to report abolished symptoms following STM   He also demonstrated improved left cervical rotation ROM from 40 degrees to 54 following STM  Patient reported return of symptoms following theraband rows  Pain was again abolished following STM  Following second bout of STM, ice was administered and rest of therapeutic exercise was halted due to increased irritability of symptoms  Patient has following up appointment with referring MD tomorrow (2/2/2018)      Plan: Continue per plan of care

## 2018-02-05 ENCOUNTER — OFFICE VISIT (OUTPATIENT)
Dept: PHYSICAL THERAPY | Facility: CLINIC | Age: 73
End: 2018-02-05
Payer: COMMERCIAL

## 2018-02-05 DIAGNOSIS — M54.81 OCCIPITAL NEURALGIA OF LEFT SIDE: Primary | ICD-10-CM

## 2018-02-05 PROCEDURE — 97140 MANUAL THERAPY 1/> REGIONS: CPT

## 2018-02-05 PROCEDURE — 97110 THERAPEUTIC EXERCISES: CPT

## 2018-02-05 NOTE — PROGRESS NOTES
Daily Note     Today's date: 2018  Patient name: Alondra Siddiqi  :   MRN: [id number]  Referring provider: Sherly Wasserman MD  Dx:   Encounter Diagnosis   Name Primary?  Occipital neuralgia of left side Yes         Subjective: Patient reported that he hasn't had no change in pain since last visit  Notes that he is only experiencing constant left sided neck pain currently  Objective: See treatment diary below  Precautions: Cervical Fusion (C5,C6, C7 25 years ago), hypertension  Daily Treatment Diary      Manual  2018               SOR TK    TK  MM               Prone STM to (L) suboccipitals and SCM TK   TK  MM                                                                                             Exercise Diary  2018               Upper Cervical Nod Supine (no chin tucks)  15 15  NP                 Self suboccipital release with tennis ball 15 NP  NP                 FTW   20  20  30               No Monies   2 x 10  2 x 10  2x10               Theraband Rows   RTB 2 x 10  RTB 2 x 10  RTB 3x10               Theraband Extension   RTB 2 x 10  RTB 2 x 10  RTB 3x10                UBE     3 fwd/ 3 retro  3'/3'                                                                                                                                                                                                                                                                                                                                             Modalities  2018                 Ice Cervical Spine Supine 10  10  10                                                                     Assessment: Tolerated treatment fair  Patient continues to report abolished symptoms following STM  He did however have increased pain with L cervical rotation  Cold packs help reduce pain   Pt was instructed on proper posture throughout treatment as well as educated on proper cold pack home use  Plan: Continue per plan of care

## 2018-02-20 ENCOUNTER — APPOINTMENT (OUTPATIENT)
Dept: PHYSICAL THERAPY | Facility: CLINIC | Age: 73
End: 2018-02-20
Payer: COMMERCIAL

## 2018-02-22 ENCOUNTER — APPOINTMENT (OUTPATIENT)
Dept: PHYSICAL THERAPY | Facility: CLINIC | Age: 73
End: 2018-02-22
Payer: COMMERCIAL

## 2018-02-23 ENCOUNTER — TRANSCRIBE ORDERS (OUTPATIENT)
Dept: ADMINISTRATIVE | Facility: HOSPITAL | Age: 73
End: 2018-02-23

## 2018-02-23 DIAGNOSIS — M54.2 NECK PAIN: Primary | ICD-10-CM

## 2018-02-26 ENCOUNTER — LAB (OUTPATIENT)
Dept: LAB | Facility: HOSPITAL | Age: 73
End: 2018-02-26
Attending: FAMILY MEDICINE
Payer: COMMERCIAL

## 2018-02-26 ENCOUNTER — HOSPITAL ENCOUNTER (OUTPATIENT)
Dept: MRI IMAGING | Facility: HOSPITAL | Age: 73
Discharge: HOME | End: 2018-02-26
Payer: COMMERCIAL

## 2018-02-26 ENCOUNTER — APPOINTMENT (OUTPATIENT)
Dept: PHYSICAL THERAPY | Facility: CLINIC | Age: 73
End: 2018-02-26
Payer: COMMERCIAL

## 2018-02-26 DIAGNOSIS — Z12.5 ENCOUNTER FOR SCREENING FOR MALIGNANT NEOPLASM OF PROSTATE: ICD-10-CM

## 2018-02-26 DIAGNOSIS — M54.2 NECK PAIN: ICD-10-CM

## 2018-02-26 DIAGNOSIS — I10 ESSENTIAL (PRIMARY) HYPERTENSION: ICD-10-CM

## 2018-02-26 LAB
ALBUMIN SERPL BCP-MCNC: 3.7 G/DL (ref 3.5–5)
ALP SERPL-CCNC: 75 U/L (ref 46–116)
ALT SERPL W P-5'-P-CCNC: 47 U/L (ref 12–78)
ANION GAP SERPL CALCULATED.3IONS-SCNC: 12 MMOL/L (ref 4–13)
AST SERPL W P-5'-P-CCNC: 18 U/L (ref 5–45)
BILIRUB SERPL-MCNC: 0.5 MG/DL (ref 0.2–1)
BUN SERPL-MCNC: 22 MG/DL (ref 5–25)
CALCIUM SERPL-MCNC: 9.6 MG/DL (ref 8.3–10.1)
CHLORIDE SERPL-SCNC: 98 MMOL/L (ref 100–108)
CO2 SERPL-SCNC: 26 MMOL/L (ref 21–32)
CREAT SERPL-MCNC: 1.19 MG/DL (ref 0.6–1.3)
GFR SERPL CREATININE-BSD FRML MDRD: 62 ML/MIN/1.73SQ M
GLUCOSE P FAST SERPL-MCNC: 305 MG/DL (ref 65–99)
POTASSIUM SERPL-SCNC: 3.8 MMOL/L (ref 3.5–5.3)
PROT SERPL-MCNC: 7.6 G/DL (ref 6.4–8.2)
SODIUM SERPL-SCNC: 136 MMOL/L (ref 136–145)

## 2018-02-26 PROCEDURE — 36415 COLL VENOUS BLD VENIPUNCTURE: CPT

## 2018-02-26 PROCEDURE — 80053 COMPREHEN METABOLIC PANEL: CPT

## 2018-02-26 PROCEDURE — 72156 MRI NECK SPINE W/O & W/DYE: CPT

## 2018-02-26 PROCEDURE — A9585 GADOBUTROL INJECTION: HCPCS | Performed by: RADIOLOGY

## 2018-02-26 RX ADMIN — GADOBUTROL 9 ML: 604.72 INJECTION INTRAVENOUS at 08:07

## 2018-02-26 NOTE — PROGRESS NOTES
Patient evaluated for possible MRI contrast allergic reaction  Pt was premedicated for allergy at this time his physical evaluation is negative for allergic response, but pt instructed that if difficulty breathing, swelling or hives developed to call and report tot he ED for treatment

## 2018-02-27 ENCOUNTER — TRANSCRIBE ORDERS (OUTPATIENT)
Dept: ADMINISTRATIVE | Facility: HOSPITAL | Age: 73
End: 2018-02-27

## 2018-02-27 DIAGNOSIS — R51.9 FACE PAIN: ICD-10-CM

## 2018-02-27 DIAGNOSIS — R51.9 ACUTE NONINTRACTABLE HEADACHE, UNSPECIFIED HEADACHE TYPE: Primary | ICD-10-CM

## 2018-02-28 ENCOUNTER — TELEPHONE (OUTPATIENT)
Dept: NEUROLOGY | Facility: CLINIC | Age: 73
End: 2018-02-28

## 2018-03-01 ENCOUNTER — HOSPITAL ENCOUNTER (OUTPATIENT)
Dept: RADIOLOGY | Age: 73
Discharge: HOME | End: 2018-03-01
Payer: COMMERCIAL

## 2018-03-01 DIAGNOSIS — R51.9 ACUTE NONINTRACTABLE HEADACHE, UNSPECIFIED HEADACHE TYPE: ICD-10-CM

## 2018-03-01 DIAGNOSIS — R51.9 FACE PAIN: ICD-10-CM

## 2018-03-01 PROCEDURE — 70551 MRI BRAIN STEM W/O DYE: CPT

## 2018-03-08 RX ORDER — ALLOPURINOL 300 MG/1
TABLET ORAL
COMMUNITY
Start: 2001-07-31 | End: 2018-08-27

## 2018-03-08 RX ORDER — MELATONIN
1 DAILY
COMMUNITY

## 2018-03-08 RX ORDER — OMEPRAZOLE 20 MG/1
20 CAPSULE, DELAYED RELEASE ORAL DAILY
COMMUNITY

## 2018-03-08 RX ORDER — DIMENHYDRINATE 50 MG
1 TABLET ORAL DAILY
COMMUNITY

## 2018-03-08 RX ORDER — TRAMADOL HYDROCHLORIDE 50 MG/1
1 TABLET ORAL 4 TIMES DAILY PRN
COMMUNITY
Start: 2017-11-21 | End: 2018-08-27

## 2018-03-08 RX ORDER — NORTRIPTYLINE HYDROCHLORIDE 25 MG/1
CAPSULE ORAL
COMMUNITY
Start: 2018-02-02 | End: 2018-08-27

## 2018-03-08 RX ORDER — AMLODIPINE BESYLATE 5 MG/1
1 TABLET ORAL DAILY
COMMUNITY
Start: 2017-11-21 | End: 2018-07-24 | Stop reason: SDUPTHER

## 2018-03-08 RX ORDER — CELECOXIB 100 MG/1
CAPSULE ORAL
COMMUNITY
End: 2019-02-25

## 2018-03-08 RX ORDER — UBIQUINOL 100 MG
1 CAPSULE ORAL DAILY
COMMUNITY
Start: 2017-06-01

## 2018-03-13 ENCOUNTER — OFFICE VISIT (OUTPATIENT)
Dept: FAMILY MEDICINE CLINIC | Facility: CLINIC | Age: 73
End: 2018-03-13
Payer: COMMERCIAL

## 2018-03-13 VITALS
HEIGHT: 70 IN | SYSTOLIC BLOOD PRESSURE: 140 MMHG | HEART RATE: 103 BPM | WEIGHT: 206 LBS | DIASTOLIC BLOOD PRESSURE: 90 MMHG | OXYGEN SATURATION: 98 % | BODY MASS INDEX: 29.49 KG/M2

## 2018-03-13 DIAGNOSIS — R94.31 ECG ABNORMALITY: ICD-10-CM

## 2018-03-13 DIAGNOSIS — Z01.818 PRE-OP EXAMINATION: Primary | ICD-10-CM

## 2018-03-13 DIAGNOSIS — Z01.818 PRE-OPERATIVE EXAMINATION: ICD-10-CM

## 2018-03-13 PROBLEM — M25.559 PAIN IN JOINT INVOLVING PELVIC REGION AND THIGH: Status: ACTIVE | Noted: 2018-03-13

## 2018-03-13 PROBLEM — M51.37 DEGENERATION OF LUMBOSACRAL INTERVERTEBRAL DISC: Status: ACTIVE | Noted: 2018-03-13

## 2018-03-13 PROBLEM — M48.02 CERVICAL SPINAL STENOSIS: Status: ACTIVE | Noted: 2017-09-11

## 2018-03-13 PROBLEM — K44.9 DIAPHRAGMATIC HERNIA: Status: ACTIVE | Noted: 2018-03-13

## 2018-03-13 PROBLEM — M54.81 OCCIPITAL NEURALGIA OF LEFT SIDE: Status: ACTIVE | Noted: 2017-08-02

## 2018-03-13 PROBLEM — M54.12 CERVICAL RADICULITIS: Status: ACTIVE | Noted: 2017-08-02

## 2018-03-13 PROBLEM — R51.9 HEAD AND FACE PAIN: Status: ACTIVE | Noted: 2017-11-15

## 2018-03-13 PROBLEM — I10 BENIGN ESSENTIAL HYPERTENSION: Status: ACTIVE | Noted: 2017-02-07

## 2018-03-13 PROBLEM — M99.9 NONALLOPATHIC LESION OF OCCIPITOCERVICAL REGION: Status: ACTIVE | Noted: 2017-09-11

## 2018-03-13 PROBLEM — G50.9 TRIGEMINAL NEUROPATHY: Status: ACTIVE | Noted: 2017-09-11

## 2018-03-13 PROBLEM — M10.9 GOUT: Status: ACTIVE | Noted: 2018-03-13

## 2018-03-13 PROBLEM — M51.379 DEGENERATION OF LUMBOSACRAL INTERVERTEBRAL DISC: Status: ACTIVE | Noted: 2018-03-13

## 2018-03-13 PROCEDURE — 93000 ELECTROCARDIOGRAM COMPLETE: CPT | Performed by: PHYSICIAN ASSISTANT

## 2018-03-13 PROCEDURE — 99212 OFFICE O/P EST SF 10 MIN: CPT | Performed by: PHYSICIAN ASSISTANT

## 2018-03-13 RX ORDER — DIAZEPAM 5 MG/1
5 TABLET ORAL
COMMUNITY
Start: 2018-03-12 | End: 2021-03-17 | Stop reason: SDUPTHER

## 2018-03-13 NOTE — PATIENT INSTRUCTIONS
EKG here in the office is normal   We will send this to the [facility] neurosurgeon  Patient is cleared for surgery

## 2018-03-13 NOTE — PROGRESS NOTES
Assessment/Plan:    No problem-specific Assessment & Plan notes found for this encounter  Problem List Items Addressed This Visit     Preop exam  Previous abnormal ECG        ECG normal, cleared for surgery    Subjective:      Patient ID: Daxa Perez is a 81 y o  male  82-year-old male in today for re-evaluation of an abnormal electrocardiogram done at [facility] for pre-surgical clearance  He states that at the time of the EKG he was extremely uncomfortable and had a lot of pain in the way he was positioned  He said his heart rate was abnormal and he just does not feel it was a good EKG  The following portions of the patient's history were reviewed and updated as appropriate:   He  has a past medical history of Hypertension  He   Patient Active Problem List    Diagnosis Date Noted    Diaphragmatic hernia 03/13/2018    Gout 03/13/2018    Pain in joint involving pelvic region and thigh 03/13/2018    Degeneration of lumbosacral intervertebral disc 03/13/2018    Pre-op examination 03/13/2018    ECG abnormality 03/13/2018    Head and face pain 11/15/2017    Cervical spinal stenosis 09/11/2017    Nonallopathic lesion of occipitocervical region 09/11/2017    Trigeminal neuropathy 09/11/2017    Cervical radiculitis 08/02/2017    Occipital neuralgia of left side 08/02/2017    Benign essential hypertension 02/07/2017    Diverticulosis of colon 02/15/2010    Personal history of malignant neoplasm of skin 12/31/2001     He  has a past surgical history that includes Anterior fusion cervical spine (25 years ago)  He  reports that he has never smoked  He has never used smokeless tobacco  He reports that he does not drink alcohol or use drugs    Current Outpatient Prescriptions   Medication Sig Dispense Refill    allopurinol (ZYLOPRIM) 300 mg tablet one tab by mouth daily      amLODIPine (NORVASC) 5 mg tablet Take 1 tablet by mouth daily      aspirin 81 MG tablet Take by mouth      celecoxib (CELEBREX) 100 mg capsule Take by mouth      cholecalciferol (VITAMIN D3) 1,000 units tablet Take 1 tablet by mouth daily      coenzyme Q-10 100 MG capsule Take 1 capsule by mouth Daily      COLLAGEN PO Take 40 mg by mouth      diazepam (VALIUM) 5 mg tablet Take 5 mg by mouth      DULoxetine (CYMBALTA) 60 mg delayed release capsule Take 20 mg by mouth daily      gabapentin (NEURONTIN) 600 MG tablet Take 300 mg by mouth 3 (three) times a day      Glucosamine 750 MG TABS Take 1 tablet by mouth daily      Milk Thistle 1000 MG CAPS Take 1 capsule by mouth daily      Multiple Vitamins-Minerals (OSTEO COMPLEX PO) Take by mouth      nortriptyline (PAMELOR) 25 mg capsule       omeprazole (PRILOSEC) 20 mg delayed release capsule Take by mouth      PROBIOTIC PRODUCT PO Take by mouth      traMADol (ULTRAM) 50 mg tablet Take 1 tablet by mouth 4 (four) times a day as needed      TURMERIC PO Take 400 mg by mouth       No current facility-administered medications for this visit        Current Outpatient Prescriptions on File Prior to Visit   Medication Sig    allopurinol (ZYLOPRIM) 300 mg tablet one tab by mouth daily    amLODIPine (NORVASC) 5 mg tablet Take 1 tablet by mouth daily    aspirin 81 MG tablet Take by mouth    celecoxib (CELEBREX) 100 mg capsule Take by mouth    cholecalciferol (VITAMIN D3) 1,000 units tablet Take 1 tablet by mouth daily    coenzyme Q-10 100 MG capsule Take 1 capsule by mouth Daily    COLLAGEN PO Take 40 mg by mouth    DULoxetine (CYMBALTA) 60 mg delayed release capsule Take 20 mg by mouth daily    gabapentin (NEURONTIN) 600 MG tablet Take 300 mg by mouth 3 (three) times a day    Glucosamine 750 MG TABS Take 1 tablet by mouth daily    Milk Thistle 1000 MG CAPS Take 1 capsule by mouth daily    Multiple Vitamins-Minerals (OSTEO COMPLEX PO) Take by mouth    nortriptyline (PAMELOR) 25 mg capsule     omeprazole (PRILOSEC) 20 mg delayed release capsule Take by mouth    PROBIOTIC PRODUCT PO Take by mouth    traMADol (ULTRAM) 50 mg tablet Take 1 tablet by mouth 4 (four) times a day as needed    TURMERIC PO Take 400 mg by mouth     No current facility-administered medications on file prior to visit  He is allergic to sulfamethoxazole-trimethoprim; gadolinium derivatives; cortisone; iodinated diagnostic agents; and hydrocortisone       Review of Systems   Constitutional: Positive for activity change and fatigue  HENT: Negative for nosebleeds  Eyes: Negative for pain  Respiratory: Negative for cough, chest tightness and shortness of breath  Cardiovascular: Negative for chest pain, palpitations and leg swelling  Endocrine: Negative  Genitourinary: Negative for difficulty urinating  Musculoskeletal: Positive for neck pain  Skin: Negative  Neurological: Positive for headaches  Psychiatric/Behavioral: Positive for sleep disturbance  Objective:      /90   Pulse 103   Ht 5' 10" (1 778 m)   Wt 93 4 kg (206 lb)   SpO2 98%   BMI 29 56 kg/m²          Physical Exam   Constitutional: He is oriented to person, place, and time  He appears well-developed  He appears distressed  HENT:   Head: Normocephalic  Cardiovascular: Normal rate and regular rhythm  Pulmonary/Chest: Effort normal    Neurological: He is alert and oriented to person, place, and time  Psychiatric: He has a normal mood and affect  His behavior is normal    Vitals reviewed

## 2018-03-31 NOTE — PROGRESS NOTES
PT Evaluation     Today's date: 3/30/2018  Patient name: Alondra Siddiqi  :   MRN: [id number]  Referring provider: Sherly Wasserman MD  Dx:   Encounter Diagnosis   Name Primary?  Occipital neuralgia of left side Yes       Start Time: 1400  Stop Time: 1444  Total time in clinic (min): 44 minutes    Assessment  Impairments: abnormal muscle tone and abnormal or restricted ROM    Assessment details: Patient has not returned to physical therapy since their visit on 2018  Measurements were unable to be updated from patient initial evaluation  As a result, patient is discharged from physical therapy      Understanding of Dx/Px/POC: good   Prognosis: good    Goals  Short term goals:  1) Patient will demonstrate improved left cervical rotation > 50 degrees with pain <2/10  in 4 weeks- not met  2) Patient will demonstrate no tenderness to palpation of left suboccipitals in 4 weeks- not met    Long term goals:  1) Patient will demonstrate ability to look over shoulder in order to check blind spot while driving with cervical pain <2/10 in 6 weeks- not met  2) Patient will demonstrate ability to read marcel for > 30 minutes without cervical pain in 6 weeks- not met  3) Patient will demonstrate ability to go without headaches for > 3 days at a time in 6 weeks- not met      Plan  Patient would benefit from: skilled PT  Planned modality interventions: cryotherapy  Planned therapy interventions: manual therapy, functional ROM exercises, therapeutic exercise, therapeutic activities, stretching, strengthening, flexibility, coordination, balance, patient education, massage, neuromuscular re-education, home exercise program and graded exercise  Frequency: 2x week  Treatment plan discussed with: patient        Subjective Evaluation    Quality of life: fair    Pain  No pain reported  Current pain ratin  At best pain ratin  At worst pain ratin  Location: Left suboccipital region, superior aspect of head, left sided headache  Quality: sharp    Social Support  Steps to enter house: yes (No handrail)  4  Stairs in house: yes (ahndrial on left)   5  Lives in: multiple-level home    Employment status: not working  Exercise history: Patient reports that he likes to go down to his [place] on at [place]  Life stress: Patient reports that he has been lavoiding lifting  Diagnostic Tests  X-ray: abnormal  MRI studies: abnormal    FCE comments: Patient reports that he has a synovial cyst in upper cervical spineTreatments  No previous or current treatments  Patient Goals  Patient goals for therapy: decreased pain  Patient goal: Get back to completing household chores/projects        Objective     Special Questions  Positive for dizziness, headaches and tinnitus  Additional Special Questions  Had tinnitus for years     Postural Observations  Seated posture: fair  Standing posture: fair        Palpation     Additional Palpation Details  Palpation of (r) suboccipital musculature- No Effect on symptoms  Palpation of (l) Rectus Capitus Superior Major-No Effect, Rectus capitis posterior minor- no effect Obliquus capitis superior- Abolished headache an suboccipital pain      Neurological Testing     Sensation   Cervical/Thoracic   Left   Intact: light touch    Right   Intact: light touch    Reflexes   Left   Biceps (C5/C6): normal (2+)  Brachioradialis (C6): normal (2+)  Triceps (C7): normal (2+)    Right   Biceps (C5/C6): normal (2+)  Brachioradialis (C6): normal (2+)  Triceps (C7): trace (1+)    Additional Neurological Details  Upper extremity myotomes WNL and equal bilaterally     Active Range of Motion   Cervical/Thoracic Spine   Cervical    Flexion: 39 degrees   Extension: 38 degrees   Left rotation: 30 degrees   Right rotation: 60 degrees     General Comments     Lumbar Comments  Patient denies numbness and tingling in face or lips, difficulty swallowing, feels occasionally off balance    Sharp-pusor (negative)       Precautions: Cervical Fusion (C5,C6, C7 25 years ago), hypertension

## 2018-04-27 ENCOUNTER — TELEPHONE (OUTPATIENT)
Dept: FAMILY MEDICINE CLINIC | Facility: CLINIC | Age: 73
End: 2018-04-27

## 2018-04-27 DIAGNOSIS — R73.9 ELEVATED BLOOD SUGAR: Primary | ICD-10-CM

## 2018-04-27 NOTE — TELEPHONE ENCOUNTER
[place]  He will be around the week of May 7th and his wife has an appointment with Leann that afternoon  Do you want him to have bloodwork done to check on his glucose while he'll be in town

## 2018-05-03 ENCOUNTER — APPOINTMENT (OUTPATIENT)
Dept: LAB | Facility: HOSPITAL | Age: 73
End: 2018-05-03
Attending: FAMILY MEDICINE
Payer: COMMERCIAL

## 2018-05-03 DIAGNOSIS — R73.9 ELEVATED BLOOD SUGAR: ICD-10-CM

## 2018-05-03 LAB
ALBUMIN SERPL BCP-MCNC: 3.8 G/DL (ref 3.5–5)
ALP SERPL-CCNC: 62 U/L (ref 46–116)
ALT SERPL W P-5'-P-CCNC: 56 U/L (ref 12–78)
ANION GAP SERPL CALCULATED.3IONS-SCNC: 6 MMOL/L (ref 4–13)
AST SERPL W P-5'-P-CCNC: 31 U/L (ref 5–45)
BILIRUB SERPL-MCNC: 0.6 MG/DL (ref 0.2–1)
BUN SERPL-MCNC: 15 MG/DL (ref 5–25)
CALCIUM SERPL-MCNC: 8.8 MG/DL (ref 8.3–10.1)
CHLORIDE SERPL-SCNC: 106 MMOL/L (ref 100–108)
CO2 SERPL-SCNC: 30 MMOL/L (ref 21–32)
CREAT SERPL-MCNC: 0.83 MG/DL (ref 0.6–1.3)
EST. AVERAGE GLUCOSE BLD GHB EST-MCNC: 134 MG/DL
GFR SERPL CREATININE-BSD FRML MDRD: 89 ML/MIN/1.73SQ M
GLUCOSE P FAST SERPL-MCNC: 126 MG/DL (ref 65–99)
HBA1C MFR BLD: 6.3 % (ref 4.2–6.3)
POTASSIUM SERPL-SCNC: 3.7 MMOL/L (ref 3.5–5.3)
PROT SERPL-MCNC: 7.2 G/DL (ref 6.4–8.2)
SODIUM SERPL-SCNC: 142 MMOL/L (ref 136–145)

## 2018-05-03 PROCEDURE — 83036 HEMOGLOBIN GLYCOSYLATED A1C: CPT

## 2018-05-03 PROCEDURE — 80053 COMPREHEN METABOLIC PANEL: CPT

## 2018-05-03 PROCEDURE — 36415 COLL VENOUS BLD VENIPUNCTURE: CPT

## 2018-05-03 RX ORDER — POLYETHYLENE GLYCOL 3350 17 G/17G
POWDER, FOR SOLUTION ORAL
Refills: 0 | COMMUNITY
Start: 2018-03-17 | End: 2018-08-27

## 2018-05-03 RX ORDER — OXYCODONE HYDROCHLORIDE 5 MG/1
TABLET ORAL
COMMUNITY
Start: 2018-03-17 | End: 2018-08-27

## 2018-05-03 RX ORDER — LIDOCAINE 50 MG/G
OINTMENT TOPICAL
Refills: 2 | COMMUNITY
Start: 2018-03-17 | End: 2019-02-25

## 2018-05-03 RX ORDER — SENNA PLUS 8.6 MG/1
8.6 TABLET ORAL
COMMUNITY
Start: 2018-03-17 | End: 2018-08-27

## 2018-05-03 RX ORDER — DEXAMETHASONE 2 MG/1
TABLET ORAL
Refills: 0 | COMMUNITY
Start: 2018-03-17 | End: 2018-08-27

## 2018-05-04 ENCOUNTER — OFFICE VISIT (OUTPATIENT)
Dept: FAMILY MEDICINE CLINIC | Facility: CLINIC | Age: 73
End: 2018-05-04
Payer: COMMERCIAL

## 2018-05-04 VITALS
SYSTOLIC BLOOD PRESSURE: 124 MMHG | TEMPERATURE: 97 F | DIASTOLIC BLOOD PRESSURE: 84 MMHG | HEART RATE: 96 BPM | OXYGEN SATURATION: 98 % | BODY MASS INDEX: 30.49 KG/M2 | WEIGHT: 213 LBS | HEIGHT: 70 IN

## 2018-05-04 DIAGNOSIS — R60.0 EDEMA OF HAND: Primary | ICD-10-CM

## 2018-05-04 DIAGNOSIS — R60.0 EDEMA, LOWER EXTREMITY: ICD-10-CM

## 2018-05-04 DIAGNOSIS — R60.0 EDEMA OF BOTH FEET: ICD-10-CM

## 2018-05-04 PROBLEM — M89.9: Status: ACTIVE | Noted: 2018-03-14

## 2018-05-04 PROBLEM — R13.10 DYSPHAGIA: Status: ACTIVE | Noted: 2018-03-16

## 2018-05-04 PROBLEM — N20.1 URETERIC STONE: Status: ACTIVE | Noted: 2018-04-17

## 2018-05-04 PROBLEM — K22.10 ULCER OF ESOPHAGUS: Status: ACTIVE | Noted: 2018-04-17

## 2018-05-04 PROCEDURE — 99213 OFFICE O/P EST LOW 20 MIN: CPT | Performed by: PHYSICIAN ASSISTANT

## 2018-05-04 RX ORDER — FUROSEMIDE 20 MG/1
TABLET ORAL
Qty: 30 TABLET | Refills: 0 | Status: SHIPPED | OUTPATIENT
Start: 2018-05-04 | End: 2018-05-25 | Stop reason: SDUPTHER

## 2018-05-04 NOTE — PROGRESS NOTES
Assessment/Plan:       Diagnoses and all orders for this visit:    Edema of hand  -     furosemide (LASIX) 20 mg tablet; Take once daily    Edema of both feet    Edema, lower extremity  -     furosemide (LASIX) 20 mg tablet; Take once daily            Subjective:      Patient ID: Ashley Huitron is a 81 y o  male  Patient comes in today with his wife complaining of swelling of his hands, feet, and lower legs bilaterally  He states this started approximately 4-5 days ago  He had his surgery for removal of a brain cyst and hematoma on March 15th of this year at [facility]  He notes the pain from that is gone  He is no longer on any steroids or pain medication at this time  He is taking his blood pressure medicine Norvasc  Patient notes that the swelling in his feet is causing pain  He also notes that he has had some slight decrease in urination during the day and overnight  He had recent lab work which showed normal kidney function  His blood sugar was 126 and his hemoglobin A1c was 6 3  We discussed that he needs to decrease carbohydrates and sugar and his diet  Patient denies any shortness of breath, cough, or chest pain  The following portions of the patient's history were reviewed and updated as appropriate:   He has a past medical history of Hypertension and Renal calculi ,   does not have any pertinent problems on file  ,   has a past surgical history that includes Anterior fusion cervical spine (25 years ago); Cervical fusion; Gallbladder surgery; Kidney surgery; and Prostate surgery  ,  family history includes Cancer in his father; Diabetes in his sister; Heart disease in his mother; No Known Problems in his brother, maternal grandfather, maternal grandmother, paternal grandfather, and paternal grandmother; Stroke in his mother  ,   reports that he has never smoked  He has never used smokeless tobacco  He reports that he does not drink alcohol or use drugs  ,  is allergic to sulfamethoxazole-trimethoprim; gadolinium derivatives; cortisone; iodinated diagnostic agents; and hydrocortisone     Current Outpatient Prescriptions   Medication Sig Dispense Refill    amLODIPine (NORVASC) 5 mg tablet Take 1 tablet by mouth daily      cholecalciferol (VITAMIN D3) 1,000 units tablet Take 1 tablet by mouth daily      coenzyme Q-10 100 MG capsule Take 1 capsule by mouth Daily      COLLAGEN PO Take 40 mg by mouth      Glucosamine 750 MG TABS Take 1 tablet by mouth daily      lidocaine (XYLOCAINE) 5 % ointment APPLY 1 APPLICATION TO AFFECTED AREA EVERY 8 HOURS AS NEEDED  2    Milk Thistle 1000 MG CAPS Take 1 capsule by mouth daily      Multiple Vitamins-Minerals (OSTEO COMPLEX PO) Take by mouth      omeprazole (PRILOSEC) 20 mg delayed release capsule Take by mouth      PROBIOTIC PRODUCT PO Take by mouth      TURMERIC PO Take 400 mg by mouth      allopurinol (ZYLOPRIM) 300 mg tablet one tab by mouth daily      aspirin 81 MG tablet Take by mouth      celecoxib (CELEBREX) 100 mg capsule Take by mouth      dexamethasone (DECADRON) 2 mg tablet TAPER AS DIRECTED ON ATTACHED SHEET  0    diazepam (VALIUM) 5 mg tablet Take 5 mg by mouth      DULoxetine (CYMBALTA) 60 mg delayed release capsule Take 20 mg by mouth daily      furosemide (LASIX) 20 mg tablet Take once daily 30 tablet 0    gabapentin (NEURONTIN) 600 MG tablet Take 300 mg by mouth 3 (three) times a day      nortriptyline (PAMELOR) 25 mg capsule       oxyCODONE (ROXICODONE) 5 mg immediate release tablet Take 1-2 tablets every 4-6 hours as needed for pain   polyethylene glycol (MIRALAX) 17 g packet TAKE 1 PACKET DAILY  0    senna (SENOKOT) 8 6 MG tablet Take 8 6 mg by mouth      traMADol (ULTRAM) 50 mg tablet Take 1 tablet by mouth 4 (four) times a day as needed       No current facility-administered medications for this visit  Review of Systems   Constitutional: Negative for fever     HENT: Negative for nosebleeds, tinnitus and trouble swallowing  Eyes: Negative for visual disturbance  Respiratory: Negative for cough  Cardiovascular: Positive for leg swelling  Negative for chest pain and palpitations  Gastrointestinal: Negative for abdominal distention  Endocrine: Negative  Musculoskeletal: Positive for arthralgias and joint swelling  Skin: Negative  Neurological: Negative for headaches  Hematological: Negative  Psychiatric/Behavioral: Negative  Objective:  Vitals:    05/04/18 0905   BP: 124/84   Pulse: 96   Temp: (!) 97 °F (36 1 °C)   SpO2: 98%   Weight: 96 6 kg (213 lb)   Height: 5' 10" (1 778 m)     Body mass index is 30 56 kg/m²  Physical Exam   Constitutional: He is oriented to person, place, and time  He appears well-developed and well-nourished  HENT:   Head: Normocephalic  Cardiovascular: Normal rate, regular rhythm, normal heart sounds and intact distal pulses  No murmur heard  Pulmonary/Chest: Effort normal and breath sounds normal  He has no wheezes  Musculoskeletal: He exhibits edema  Patient with 2+ pitting edema to mid shin bilaterally  His hands are noticeably swollen and he has difficulty bending his fingers and making a fist    Neurological: He is alert and oriented to person, place, and time  Skin: Skin is dry  No rash noted  No erythema  Psychiatric: He has a normal mood and affect   His behavior is normal  Judgment and thought content normal

## 2018-05-25 DIAGNOSIS — R60.0 EDEMA OF HAND: ICD-10-CM

## 2018-05-25 DIAGNOSIS — R60.0 EDEMA, LOWER EXTREMITY: ICD-10-CM

## 2018-05-25 RX ORDER — FUROSEMIDE 20 MG/1
TABLET ORAL
Qty: 30 TABLET | Refills: 0 | Status: SHIPPED | OUTPATIENT
Start: 2018-05-25 | End: 2018-08-27

## 2018-05-25 NOTE — TELEPHONE ENCOUNTER
This is Leann and the message was sent to Dr Marnie Wallace but Dr Marnie Wallace is away until next week  Can you please resend pt prescription to   [facility]

## 2018-06-20 ENCOUNTER — TELEPHONE (OUTPATIENT)
Dept: FAMILY MEDICINE CLINIC | Facility: CLINIC | Age: 73
End: 2018-06-20

## 2018-06-20 NOTE — TELEPHONE ENCOUNTER
Pt had to use blood work script for MRI in February  Pt is wondering if you would like new labs drawn

## 2018-06-25 DIAGNOSIS — Z12.5 SCREENING PSA (PROSTATE SPECIFIC ANTIGEN): ICD-10-CM

## 2018-06-25 DIAGNOSIS — Z13.6 SCREENING FOR CARDIOVASCULAR CONDITION: Primary | ICD-10-CM

## 2018-07-24 DIAGNOSIS — I10 ESSENTIAL HYPERTENSION: Primary | ICD-10-CM

## 2018-07-24 RX ORDER — AMLODIPINE BESYLATE 5 MG/1
5 TABLET ORAL DAILY
Qty: 30 TABLET | Refills: 3 | Status: SHIPPED | OUTPATIENT
Start: 2018-07-24 | End: 2018-11-19 | Stop reason: SDUPTHER

## 2018-08-27 ENCOUNTER — OFFICE VISIT (OUTPATIENT)
Dept: FAMILY MEDICINE CLINIC | Facility: CLINIC | Age: 73
End: 2018-08-27
Payer: COMMERCIAL

## 2018-08-27 VITALS
OXYGEN SATURATION: 97 % | BODY MASS INDEX: 29.06 KG/M2 | DIASTOLIC BLOOD PRESSURE: 80 MMHG | WEIGHT: 203 LBS | SYSTOLIC BLOOD PRESSURE: 130 MMHG | HEIGHT: 70 IN | HEART RATE: 96 BPM

## 2018-08-27 DIAGNOSIS — I10 BENIGN ESSENTIAL HYPERTENSION: Primary | ICD-10-CM

## 2018-08-27 DIAGNOSIS — M54.12 CERVICAL RADICULITIS: ICD-10-CM

## 2018-08-27 DIAGNOSIS — Z12.11 SCREEN FOR COLON CANCER: ICD-10-CM

## 2018-08-27 DIAGNOSIS — M48.02 CERVICAL SPINAL STENOSIS: ICD-10-CM

## 2018-08-27 PROBLEM — Z01.818 PRE-OP EXAMINATION: Status: RESOLVED | Noted: 2018-03-13 | Resolved: 2018-08-27

## 2018-08-27 PROBLEM — R94.31 ECG ABNORMALITY: Status: RESOLVED | Noted: 2018-03-13 | Resolved: 2018-08-27

## 2018-08-27 PROBLEM — R60.0 EDEMA OF HAND: Status: RESOLVED | Noted: 2018-05-04 | Resolved: 2018-08-27

## 2018-08-27 PROBLEM — R60.0 EDEMA OF BOTH FEET: Status: RESOLVED | Noted: 2018-05-04 | Resolved: 2018-08-27

## 2018-08-27 PROCEDURE — 3075F SYST BP GE 130 - 139MM HG: CPT | Performed by: FAMILY MEDICINE

## 2018-08-27 PROCEDURE — 3079F DIAST BP 80-89 MM HG: CPT | Performed by: FAMILY MEDICINE

## 2018-08-27 PROCEDURE — 3008F BODY MASS INDEX DOCD: CPT | Performed by: FAMILY MEDICINE

## 2018-08-27 PROCEDURE — 99214 OFFICE O/P EST MOD 30 MIN: CPT | Performed by: FAMILY MEDICINE

## 2018-08-27 PROCEDURE — 1160F RVW MEDS BY RX/DR IN RCRD: CPT | Performed by: FAMILY MEDICINE

## 2018-08-28 ENCOUNTER — APPOINTMENT (OUTPATIENT)
Dept: LAB | Facility: HOSPITAL | Age: 73
End: 2018-08-28
Attending: FAMILY MEDICINE
Payer: COMMERCIAL

## 2018-08-28 DIAGNOSIS — Z13.6 SCREENING FOR CARDIOVASCULAR CONDITION: ICD-10-CM

## 2018-08-28 DIAGNOSIS — Z12.5 SCREENING PSA (PROSTATE SPECIFIC ANTIGEN): ICD-10-CM

## 2018-08-28 LAB
CHOLEST SERPL-MCNC: 181 MG/DL (ref 50–200)
HDLC SERPL-MCNC: 37 MG/DL (ref 40–60)
LDLC SERPL CALC-MCNC: 115 MG/DL (ref 0–100)
NONHDLC SERPL-MCNC: 144 MG/DL
PSA SERPL-MCNC: 0.8 NG/ML (ref 0–4)
TRIGL SERPL-MCNC: 143 MG/DL

## 2018-08-28 PROCEDURE — 36415 COLL VENOUS BLD VENIPUNCTURE: CPT

## 2018-08-28 PROCEDURE — G0103 PSA SCREENING: HCPCS

## 2018-08-28 PROCEDURE — 80061 LIPID PANEL: CPT

## 2018-11-19 DIAGNOSIS — I10 ESSENTIAL HYPERTENSION: ICD-10-CM

## 2018-11-19 RX ORDER — AMLODIPINE BESYLATE 5 MG/1
5 TABLET ORAL DAILY
Qty: 30 TABLET | Refills: 5 | Status: SHIPPED | OUTPATIENT
Start: 2018-11-19 | End: 2019-03-14 | Stop reason: SDUPTHER

## 2019-02-13 ENCOUNTER — TELEPHONE (OUTPATIENT)
Dept: GASTROENTEROLOGY | Facility: CLINIC | Age: 74
End: 2019-02-13

## 2019-02-22 ENCOUNTER — TELEPHONE (OUTPATIENT)
Dept: FAMILY MEDICINE CLINIC | Facility: CLINIC | Age: 74
End: 2019-02-22

## 2019-02-22 NOTE — TELEPHONE ENCOUNTER
Patient called in regards to having labs done prior to his visit    Patient has an appt on Monday and was advise unless he can get the bloodwork on Saturday they will not be back in time for his appt    Patient stated he is going away this weekend and will not be back until Monday     Patient is okay with waiting until his appt to get order and getting labs done after appt    Is that okay with you as well or would you like to blood work done first and the appt r/s until after the labs are done    Please advise

## 2019-02-25 ENCOUNTER — OFFICE VISIT (OUTPATIENT)
Dept: FAMILY MEDICINE CLINIC | Facility: CLINIC | Age: 74
End: 2019-02-25
Payer: COMMERCIAL

## 2019-02-25 VITALS
HEART RATE: 100 BPM | TEMPERATURE: 98.1 F | BODY MASS INDEX: 29.63 KG/M2 | DIASTOLIC BLOOD PRESSURE: 70 MMHG | WEIGHT: 207 LBS | OXYGEN SATURATION: 94 % | HEIGHT: 70 IN | SYSTOLIC BLOOD PRESSURE: 122 MMHG

## 2019-02-25 DIAGNOSIS — M51.37 DEGENERATION OF LUMBOSACRAL INTERVERTEBRAL DISC: ICD-10-CM

## 2019-02-25 DIAGNOSIS — I10 BENIGN ESSENTIAL HYPERTENSION: Primary | ICD-10-CM

## 2019-02-25 DIAGNOSIS — E66.3 OVERWEIGHT (BMI 25.0-29.9): ICD-10-CM

## 2019-02-25 DIAGNOSIS — F41.9 ANXIETY: ICD-10-CM

## 2019-02-25 PROCEDURE — 3074F SYST BP LT 130 MM HG: CPT | Performed by: FAMILY MEDICINE

## 2019-02-25 PROCEDURE — 99214 OFFICE O/P EST MOD 30 MIN: CPT | Performed by: FAMILY MEDICINE

## 2019-02-25 PROCEDURE — 1160F RVW MEDS BY RX/DR IN RCRD: CPT | Performed by: FAMILY MEDICINE

## 2019-02-25 PROCEDURE — 1101F PT FALLS ASSESS-DOCD LE1/YR: CPT | Performed by: FAMILY MEDICINE

## 2019-02-25 PROCEDURE — 3008F BODY MASS INDEX DOCD: CPT | Performed by: FAMILY MEDICINE

## 2019-02-25 PROCEDURE — 3078F DIAST BP <80 MM HG: CPT | Performed by: FAMILY MEDICINE

## 2019-02-25 RX ORDER — HYDROXYZINE HYDROCHLORIDE 25 MG/1
25 TABLET, FILM COATED ORAL EVERY 6 HOURS PRN
Qty: 30 TABLET | Refills: 0 | Status: SHIPPED | OUTPATIENT
Start: 2019-02-25 | End: 2021-05-10

## 2019-02-25 NOTE — ASSESSMENT & PLAN NOTE
He is not taking any anti-inflammatories, he is taking his glucosamine    He is going to try CBD oil

## 2019-02-25 NOTE — PROGRESS NOTES
Assessment/Plan:       Diagnoses and all orders for this visit:    Benign essential hypertension  -     CBC; Future  -     Comprehensive metabolic panel; Future  -     Lipid panel; Future  -     TSH, 3rd generation; Future    Degeneration of lumbosacral intervertebral disc    Anxiety  -     hydrOXYzine HCL (ATARAX) 25 mg tablet; Take 1 tablet (25 mg total) by mouth every 6 (six) hours as needed for itching  -     TSH, 3rd generation; Future        Benign essential hypertension  Controlled, continue amlodipine  Degeneration of lumbosacral intervertebral disc  He is not taking any anti-inflammatories, he is taking his glucosamine  He is going to try CBD oil        Subjective:      Patient ID: Richie Swift is a 72 y o  male  Patient comes in today for check on his blood pressure, his lumbar degenerative disc disease  He states he is taking his amlodipine daily, no side effects  He is compliant with the medication  He is no longer taking any anti-inflammatories  He was taking Celebrex however he stopped taking this on his own  He states he has recently started CBD oil  He thinks that this is helping  He does complain of some worsening situational anxiety  He states he has used Valium as needed and it does help  He states this is very intermittent  He would like to try something not as strong as the Valium  The following portions of the patient's history were reviewed and updated as appropriate:   He has a past medical history of Hypertension and Renal calculi ,  does not have any pertinent problems on file  ,   has a past surgical history that includes Anterior fusion cervical spine (25 years ago); Cervical fusion; Gallbladder surgery; Kidney surgery; and Prostate surgery  ,  family history includes Cancer in his father; Diabetes in his sister;  Heart disease in his mother; No Known Problems in his brother, maternal grandfather, maternal grandmother, paternal grandfather, and paternal grandmother; Stroke in his mother  ,   reports that he has never smoked  He has never used smokeless tobacco  He reports that he does not drink alcohol or use drugs  ,  is allergic to sulfamethoxazole-trimethoprim; gadolinium derivatives; cortisone; iodinated diagnostic agents; and hydrocortisone     Current Outpatient Medications   Medication Sig Dispense Refill    amLODIPine (NORVASC) 5 mg tablet Take 1 tablet (5 mg total) by mouth daily 30 tablet 5    cholecalciferol (VITAMIN D3) 1,000 units tablet Take 1 tablet by mouth daily      coenzyme Q-10 100 MG capsule Take 1 capsule by mouth Daily      COLLAGEN PO Take 40 mg by mouth      diazepam (VALIUM) 5 mg tablet Take 5 mg by mouth      diclofenac sodium (VOLTAREN) 1 % APPLY 4 GRAMS ON THE SKIN 4 TIMES A DAY  DO NOT APPLY TO BROKEN OR INFLAMED SKIN  1    Glucosamine 750 MG TABS Take 1 tablet by mouth daily      Milk Thistle 1000 MG CAPS Take 1 capsule by mouth daily      Multiple Vitamins-Minerals (OSTEO COMPLEX PO) Take by mouth      omeprazole (PRILOSEC) 20 mg delayed release capsule Take by mouth      PROBIOTIC PRODUCT PO Take by mouth      TURMERIC PO Take 400 mg by mouth      aspirin 81 MG tablet Take by mouth      hydrOXYzine HCL (ATARAX) 25 mg tablet Take 1 tablet (25 mg total) by mouth every 6 (six) hours as needed for itching 30 tablet 0     No current facility-administered medications for this visit  Review of Systems   Constitutional: Negative for chills, fatigue and fever  HENT: Negative for congestion, ear pain, hearing loss, postnasal drip, rhinorrhea and sore throat  Eyes: Negative for pain and visual disturbance  Respiratory: Negative for chest tightness, shortness of breath and wheezing  Cardiovascular: Negative for chest pain and leg swelling  Gastrointestinal: Negative for abdominal distention, abdominal pain, constipation, diarrhea and vomiting  Endocrine: Negative for cold intolerance and heat intolerance  Genitourinary: Negative for difficulty urinating, frequency and urgency  Musculoskeletal: Positive for arthralgias  Negative for gait problem  Skin: Negative for color change  Neurological: Negative for dizziness, tremors, syncope, numbness and headaches  Hematological: Negative for adenopathy  Psychiatric/Behavioral: Negative for agitation, confusion and sleep disturbance  The patient is nervous/anxious  Objective:  Vitals:    02/25/19 1354   BP: 122/70   Pulse: 100   Temp: 98 1 °F (36 7 °C)   SpO2: 94%   Weight: 93 9 kg (207 lb)   Height: 5' 10" (1 778 m)     Body mass index is 29 7 kg/m²  Physical Exam   Constitutional: He is oriented to person, place, and time  He appears well-developed and well-nourished  HENT:   Head: Normocephalic  Mouth/Throat: Oropharynx is clear and moist    Eyes: Conjunctivae are normal  No scleral icterus  Neck: Normal range of motion  No thyromegaly present  Cardiovascular: Normal rate, regular rhythm and normal heart sounds  No murmur heard  Pulmonary/Chest: Effort normal and breath sounds normal  No respiratory distress  He has no wheezes  Abdominal: Soft  Bowel sounds are normal  He exhibits no distension  There is no tenderness  Musculoskeletal: Normal range of motion  He exhibits no edema or tenderness  Lymphadenopathy:     He has no cervical adenopathy  Neurological: He is alert and oriented to person, place, and time  No cranial nerve deficit  Skin: Skin is warm and dry  No rash noted  No pallor  Psychiatric: He has a normal mood and affect  His behavior is normal    Nursing note and vitals reviewed  BMI Counseling: Body mass index is 29 7 kg/m²  Discussed the patient's BMI with him  The BMI is above average  BMI counseling and education was provided to the patient  Nutrition recommendations include reducing portion sizes, decreasing overall calorie intake and 3-5 servings of fruits/vegetables daily   Exercise recommendations include moderate aerobic physical activity for 150 minutes/week

## 2019-02-25 NOTE — PATIENT INSTRUCTIONS
Obesity   AMBULATORY CARE:   Obesity  is when your body mass index (BMI) is greater than 30  Your healthcare provider will use your height and weight to measure your BMI  The risks of obesity include  many health problems, such as injuries or physical disability  You may need tests to check for the following:  · Diabetes     · High blood pressure or high cholesterol     · Heart disease     · Gallbladder or liver disease     · Cancer of the colon, breast, prostate, liver, or kidney     · Sleep apnea     · Arthritis or gout  Seek care immediately if:   · You have a severe headache, confusion, or difficulty speaking  · You have weakness on one side of your body  · You have chest pain, sweating, or shortness of breath  Contact your healthcare provider if:   · You have symptoms of gallbladder or liver disease, such as pain in your upper abdomen  · You have knee or hip pain and discomfort while walking  · You have symptoms of diabetes, such as intense hunger and thirst, and frequent urination  · You have symptoms of sleep apnea, such as snoring or daytime sleepiness  · You have questions or concerns about your condition or care  Treatment for obesity  focuses on helping you lose weight to improve your health  Even a small decrease in BMI can reduce the risk for many health problems  Your healthcare provider will help you set a weight-loss goal   · Lifestyle changes  are the first step in treating obesity  These include making healthy food choices and getting regular physical activity  Your healthcare provider may suggest a weight-loss program that involves coaching, education, and therapy  · Medicine  may help you lose weight when it is used with a healthy diet and physical activity  · Surgery  can help you lose weight if you are very obese and have other health problems  There are several types of weight-loss surgery  Ask your healthcare provider for more information    Be successful losing weight:   · Set small, realistic goals  An example of a small goal is to walk for 20 minutes 5 days a week  Anther goal is to lose 5% of your body weight  · Tell friends, family members, and coworkers about your goals  and ask for their support  Ask a friend to lose weight with you, or join a weight-loss support group  · Identify foods or triggers that may cause you to overeat , and find ways to avoid them  Remove tempting high-calorie foods from your home and workplace  Place a bowl of fresh fruit on your kitchen counter  If stress causes you to eat, then find other ways to cope with stress  · Keep a diary to track what you eat and drink  Also write down how many minutes of physical activity you do each day  Weigh yourself once a week and record it in your diary  Eating changes: You will need to eat 500 to 1,000 fewer calories each day than you currently eat to lose 1 to 2 pounds a week  The following changes will help you cut calories:  · Eat smaller portions  Use small plates, no larger than 9 inches in diameter  Fill your plate half full of fruits and vegetables  Measure your food using measuring cups until you know what a serving size looks like  · Eat 3 meals and 1 or 2 snacks each day  Plan your meals in advance  Samaria Jaimes and eat at home most of the time  Eat slowly  · Eat fruits and vegetables at every meal   They are low in calories and high in fiber, which makes you feel full  Do not add butter, margarine, or cream sauce to vegetables  Use herbs to season steamed vegetables  · Eat less fat and fewer fried foods  Eat more baked or grilled chicken and fish  These protein sources are lower in calories and fat than red meat  Limit fast food  Dress your salads with olive oil and vinegar instead of bottled dressing  · Limit the amount of sugar you eat  Do not drink sugary beverages  Limit alcohol  Activity changes:  Physical activity is good for your body in many ways   It helps you burn calories and build strong muscles  It decreases stress and depression, and improves your mood  It can also help you sleep better  Talk to your healthcare provider before you begin an exercise program   · Exercise for at least 30 minutes 5 days a week  Start slowly  Set aside time each day for physical activity that you enjoy and that is convenient for you  It is best to do both weight training and an activity that increases your heart rate, such as walking, bicycling, or swimming  · Find ways to be more active  Do yard work and housecleaning  Walk up the stairs instead of using elevators  Spend your leisure time going to events that require walking, such as outdoor festivals or fairs  This extra physical activity can help you lose weight and keep it off  Follow up with your healthcare provider as directed: You may need to meet with a dietitian  Write down your questions so you remember to ask them during your visits  © 2017 [employer] Information is for End User's use only and may not be sold, redistributed or otherwise used for commercial purposes  All illustrations and images included in CareNotes® are the copyrighted property of [employer]  or Hayden Betts  The above information is an  only  It is not intended as medical advice for individual conditions or treatments  Talk to your doctor, nurse or pharmacist before following any medical regimen to see if it is safe and effective for you

## 2019-03-14 ENCOUNTER — TELEPHONE (OUTPATIENT)
Dept: FAMILY MEDICINE CLINIC | Facility: CLINIC | Age: 74
End: 2019-03-14

## 2019-03-14 DIAGNOSIS — I10 ESSENTIAL HYPERTENSION: ICD-10-CM

## 2019-03-14 RX ORDER — AMLODIPINE BESYLATE 5 MG/1
5 TABLET ORAL DAILY
Qty: 30 TABLET | Refills: 5 | Status: SHIPPED | OUTPATIENT
Start: 2019-03-14 | End: 2019-11-15 | Stop reason: SDUPTHER

## 2019-04-02 ENCOUNTER — APPOINTMENT (OUTPATIENT)
Dept: LAB | Facility: HOSPITAL | Age: 74
End: 2019-04-02
Attending: FAMILY MEDICINE
Payer: COMMERCIAL

## 2019-04-02 DIAGNOSIS — F41.9 ANXIETY: ICD-10-CM

## 2019-04-02 DIAGNOSIS — I10 BENIGN ESSENTIAL HYPERTENSION: ICD-10-CM

## 2019-04-02 LAB
ALBUMIN SERPL BCP-MCNC: 4 G/DL (ref 3.5–5)
ALP SERPL-CCNC: 77 U/L (ref 46–116)
ALT SERPL W P-5'-P-CCNC: 54 U/L (ref 12–78)
ANION GAP SERPL CALCULATED.3IONS-SCNC: 9 MMOL/L (ref 4–13)
AST SERPL W P-5'-P-CCNC: 26 U/L (ref 5–45)
BILIRUB SERPL-MCNC: 0.7 MG/DL (ref 0.2–1)
BUN SERPL-MCNC: 22 MG/DL (ref 5–25)
CALCIUM SERPL-MCNC: 9 MG/DL (ref 8.3–10.1)
CHLORIDE SERPL-SCNC: 104 MMOL/L (ref 100–108)
CHOLEST SERPL-MCNC: 176 MG/DL (ref 50–200)
CO2 SERPL-SCNC: 27 MMOL/L (ref 21–32)
CREAT SERPL-MCNC: 0.79 MG/DL (ref 0.6–1.3)
ERYTHROCYTE [DISTWIDTH] IN BLOOD BY AUTOMATED COUNT: 13.2 % (ref 11.6–15.1)
GFR SERPL CREATININE-BSD FRML MDRD: 90 ML/MIN/1.73SQ M
GLUCOSE P FAST SERPL-MCNC: 119 MG/DL (ref 65–99)
HCT VFR BLD AUTO: 49.8 % (ref 36.5–49.3)
HDLC SERPL-MCNC: 35 MG/DL (ref 40–60)
HGB BLD-MCNC: 16.5 G/DL (ref 12–17)
LDLC SERPL CALC-MCNC: 118 MG/DL (ref 0–100)
MCH RBC QN AUTO: 31.9 PG (ref 26.8–34.3)
MCHC RBC AUTO-ENTMCNC: 33.1 G/DL (ref 31.4–37.4)
MCV RBC AUTO: 96 FL (ref 82–98)
NONHDLC SERPL-MCNC: 141 MG/DL
PLATELET # BLD AUTO: 259 THOUSANDS/UL (ref 149–390)
PMV BLD AUTO: 9.8 FL (ref 8.9–12.7)
POTASSIUM SERPL-SCNC: 3.8 MMOL/L (ref 3.5–5.3)
PROT SERPL-MCNC: 7.6 G/DL (ref 6.4–8.2)
RBC # BLD AUTO: 5.18 MILLION/UL (ref 3.88–5.62)
SODIUM SERPL-SCNC: 140 MMOL/L (ref 136–145)
TRIGL SERPL-MCNC: 113 MG/DL
TSH SERPL DL<=0.05 MIU/L-ACNC: 2.19 UIU/ML (ref 0.36–3.74)
WBC # BLD AUTO: 7.89 THOUSAND/UL (ref 4.31–10.16)

## 2019-04-02 PROCEDURE — 80061 LIPID PANEL: CPT

## 2019-04-02 PROCEDURE — 80053 COMPREHEN METABOLIC PANEL: CPT

## 2019-04-02 PROCEDURE — 85027 COMPLETE CBC AUTOMATED: CPT

## 2019-04-02 PROCEDURE — 36415 COLL VENOUS BLD VENIPUNCTURE: CPT

## 2019-04-02 PROCEDURE — 84443 ASSAY THYROID STIM HORMONE: CPT

## 2019-04-10 ENCOUNTER — OFFICE VISIT (OUTPATIENT)
Dept: FAMILY MEDICINE CLINIC | Facility: CLINIC | Age: 74
End: 2019-04-10
Payer: COMMERCIAL

## 2019-04-10 VITALS
SYSTOLIC BLOOD PRESSURE: 112 MMHG | HEART RATE: 100 BPM | HEIGHT: 70 IN | BODY MASS INDEX: 29.49 KG/M2 | OXYGEN SATURATION: 95 % | TEMPERATURE: 98.1 F | DIASTOLIC BLOOD PRESSURE: 82 MMHG | WEIGHT: 206 LBS

## 2019-04-10 DIAGNOSIS — K13.70 ORAL LESION: Primary | ICD-10-CM

## 2019-04-10 PROCEDURE — 99213 OFFICE O/P EST LOW 20 MIN: CPT | Performed by: FAMILY MEDICINE

## 2019-04-10 PROCEDURE — 3008F BODY MASS INDEX DOCD: CPT | Performed by: FAMILY MEDICINE

## 2019-04-10 PROCEDURE — 1160F RVW MEDS BY RX/DR IN RCRD: CPT | Performed by: FAMILY MEDICINE

## 2019-04-11 ENCOUNTER — OFFICE VISIT (OUTPATIENT)
Dept: GASTROENTEROLOGY | Facility: CLINIC | Age: 74
End: 2019-04-11
Payer: COMMERCIAL

## 2019-04-11 VITALS
WEIGHT: 205 LBS | HEART RATE: 93 BPM | BODY MASS INDEX: 29.35 KG/M2 | RESPIRATION RATE: 16 BRPM | DIASTOLIC BLOOD PRESSURE: 70 MMHG | HEIGHT: 70 IN | SYSTOLIC BLOOD PRESSURE: 126 MMHG

## 2019-04-11 DIAGNOSIS — K21.9 GASTROESOPHAGEAL REFLUX DISEASE, ESOPHAGITIS PRESENCE NOT SPECIFIED: Primary | ICD-10-CM

## 2019-04-11 DIAGNOSIS — R19.7 DIARRHEA, UNSPECIFIED TYPE: ICD-10-CM

## 2019-04-11 DIAGNOSIS — Z86.010 HISTORY OF COLON POLYPS: ICD-10-CM

## 2019-04-11 PROBLEM — Z86.0100 HISTORY OF COLONIC POLYPS: Status: ACTIVE | Noted: 2019-04-11

## 2019-04-11 PROCEDURE — 99244 OFF/OP CNSLTJ NEW/EST MOD 40: CPT | Performed by: INTERNAL MEDICINE

## 2019-04-30 ENCOUNTER — ANESTHESIA EVENT (OUTPATIENT)
Dept: PERIOP | Facility: HOSPITAL | Age: 74
End: 2019-04-30
Payer: COMMERCIAL

## 2019-05-01 ENCOUNTER — HOSPITAL ENCOUNTER (OUTPATIENT)
Facility: HOSPITAL | Age: 74
Setting detail: OUTPATIENT SURGERY
Discharge: HOME | End: 2019-05-01
Attending: INTERNAL MEDICINE | Admitting: INTERNAL MEDICINE
Payer: COMMERCIAL

## 2019-05-01 ENCOUNTER — ANESTHESIA (OUTPATIENT)
Dept: PERIOP | Facility: HOSPITAL | Age: 74
End: 2019-05-01
Payer: COMMERCIAL

## 2019-05-01 VITALS
DIASTOLIC BLOOD PRESSURE: 78 MMHG | RESPIRATION RATE: 23 BRPM | TEMPERATURE: 96.9 F | HEIGHT: 70 IN | HEART RATE: 76 BPM | SYSTOLIC BLOOD PRESSURE: 134 MMHG | WEIGHT: 201.28 LBS | OXYGEN SATURATION: 94 % | BODY MASS INDEX: 28.82 KG/M2

## 2019-05-01 DIAGNOSIS — Z86.010 HISTORY OF COLONIC POLYPS: ICD-10-CM

## 2019-05-01 DIAGNOSIS — R19.7 DIARRHEA: ICD-10-CM

## 2019-05-01 DIAGNOSIS — K21.9 GASTRO-ESOPHAGEAL REFLUX DISEASE WITHOUT ESOPHAGITIS: ICD-10-CM

## 2019-05-01 PROCEDURE — 88305 TISSUE EXAM BY PATHOLOGIST: CPT | Performed by: PATHOLOGY

## 2019-05-01 PROCEDURE — 43235 EGD DIAGNOSTIC BRUSH WASH: CPT | Performed by: INTERNAL MEDICINE

## 2019-05-01 PROCEDURE — 45380 COLONOSCOPY AND BIOPSY: CPT | Performed by: INTERNAL MEDICINE

## 2019-05-01 PROCEDURE — 45384 COLONOSCOPY W/LESION REMOVAL: CPT | Performed by: INTERNAL MEDICINE

## 2019-05-01 RX ORDER — SODIUM CHLORIDE, SODIUM LACTATE, POTASSIUM CHLORIDE, CALCIUM CHLORIDE 600; 310; 30; 20 MG/100ML; MG/100ML; MG/100ML; MG/100ML
INJECTION, SOLUTION INTRAVENOUS CONTINUOUS PRN
Status: DISCONTINUED | OUTPATIENT
Start: 2019-05-01 | End: 2019-05-01 | Stop reason: SURG

## 2019-05-01 RX ORDER — PROPOFOL 10 MG/ML
INJECTION, EMULSION INTRAVENOUS AS NEEDED
Status: DISCONTINUED | OUTPATIENT
Start: 2019-05-01 | End: 2019-05-01 | Stop reason: SURG

## 2019-05-01 RX ORDER — SODIUM CHLORIDE, SODIUM LACTATE, POTASSIUM CHLORIDE, CALCIUM CHLORIDE 600; 310; 30; 20 MG/100ML; MG/100ML; MG/100ML; MG/100ML
125 INJECTION, SOLUTION INTRAVENOUS CONTINUOUS
Status: DISCONTINUED | OUTPATIENT
Start: 2019-05-01 | End: 2019-05-01 | Stop reason: HOSPADM

## 2019-05-01 RX ORDER — LIDOCAINE HYDROCHLORIDE 10 MG/ML
INJECTION, SOLUTION INFILTRATION; PERINEURAL AS NEEDED
Status: DISCONTINUED | OUTPATIENT
Start: 2019-05-01 | End: 2019-05-01 | Stop reason: SURG

## 2019-05-01 RX ADMIN — SODIUM CHLORIDE, SODIUM LACTATE, POTASSIUM CHLORIDE, AND CALCIUM CHLORIDE: .6; .31; .03; .02 INJECTION, SOLUTION INTRAVENOUS at 08:01

## 2019-05-01 RX ADMIN — PROPOFOL 20 MG: 10 INJECTION, EMULSION INTRAVENOUS at 08:10

## 2019-05-01 RX ADMIN — PROPOFOL 130 MG: 10 INJECTION, EMULSION INTRAVENOUS at 08:07

## 2019-05-01 RX ADMIN — LIDOCAINE HYDROCHLORIDE 50 MG: 10 INJECTION, SOLUTION INFILTRATION; PERINEURAL at 08:07

## 2019-05-01 RX ADMIN — PROPOFOL 30 MG: 10 INJECTION, EMULSION INTRAVENOUS at 08:18

## 2019-05-01 RX ADMIN — PROPOFOL 50 MG: 10 INJECTION, EMULSION INTRAVENOUS at 08:15

## 2019-05-07 ENCOUNTER — TELEPHONE (OUTPATIENT)
Dept: FAMILY MEDICINE CLINIC | Facility: CLINIC | Age: 74
End: 2019-05-07

## 2019-05-09 ENCOUNTER — TELEPHONE (OUTPATIENT)
Dept: GASTROENTEROLOGY | Facility: CLINIC | Age: 74
End: 2019-05-09

## 2019-06-19 ENCOUNTER — OFFICE VISIT (OUTPATIENT)
Dept: FAMILY MEDICINE CLINIC | Facility: CLINIC | Age: 74
End: 2019-06-19
Payer: COMMERCIAL

## 2019-06-19 VITALS
HEART RATE: 94 BPM | TEMPERATURE: 97.2 F | HEIGHT: 70 IN | SYSTOLIC BLOOD PRESSURE: 122 MMHG | WEIGHT: 203 LBS | DIASTOLIC BLOOD PRESSURE: 70 MMHG | BODY MASS INDEX: 29.06 KG/M2 | OXYGEN SATURATION: 97 %

## 2019-06-19 DIAGNOSIS — M19.90 ARTHRITIS: Primary | ICD-10-CM

## 2019-06-19 PROCEDURE — 99213 OFFICE O/P EST LOW 20 MIN: CPT | Performed by: FAMILY MEDICINE

## 2019-06-19 PROCEDURE — 1160F RVW MEDS BY RX/DR IN RCRD: CPT | Performed by: FAMILY MEDICINE

## 2019-06-19 PROCEDURE — 3008F BODY MASS INDEX DOCD: CPT | Performed by: FAMILY MEDICINE

## 2019-06-19 RX ORDER — MELOXICAM 15 MG/1
15 TABLET ORAL DAILY
Qty: 30 TABLET | Refills: 1 | Status: SHIPPED | OUTPATIENT
Start: 2019-06-19 | End: 2019-08-13 | Stop reason: SDUPTHER

## 2019-08-13 DIAGNOSIS — M19.90 ARTHRITIS: ICD-10-CM

## 2019-08-14 RX ORDER — MELOXICAM 15 MG/1
TABLET ORAL
Qty: 30 TABLET | Refills: 0 | Status: SHIPPED | OUTPATIENT
Start: 2019-08-14 | End: 2019-09-13 | Stop reason: SDUPTHER

## 2019-09-11 ENCOUNTER — OFFICE VISIT (OUTPATIENT)
Dept: FAMILY MEDICINE CLINIC | Facility: CLINIC | Age: 74
End: 2019-09-11
Payer: COMMERCIAL

## 2019-09-11 VITALS
WEIGHT: 203 LBS | BODY MASS INDEX: 29.06 KG/M2 | TEMPERATURE: 98.2 F | HEIGHT: 70 IN | OXYGEN SATURATION: 98 % | SYSTOLIC BLOOD PRESSURE: 140 MMHG | DIASTOLIC BLOOD PRESSURE: 70 MMHG | HEART RATE: 80 BPM

## 2019-09-11 DIAGNOSIS — Z12.5 SCREENING PSA (PROSTATE SPECIFIC ANTIGEN): ICD-10-CM

## 2019-09-11 DIAGNOSIS — N50.812 TESTICULAR PAIN, LEFT: ICD-10-CM

## 2019-09-11 DIAGNOSIS — M51.37 DEGENERATION OF LUMBOSACRAL INTERVERTEBRAL DISC: Primary | ICD-10-CM

## 2019-09-11 DIAGNOSIS — I10 BENIGN ESSENTIAL HYPERTENSION: ICD-10-CM

## 2019-09-11 PROCEDURE — 1101F PT FALLS ASSESS-DOCD LE1/YR: CPT | Performed by: FAMILY MEDICINE

## 2019-09-11 PROCEDURE — 3008F BODY MASS INDEX DOCD: CPT | Performed by: FAMILY MEDICINE

## 2019-09-11 PROCEDURE — 99214 OFFICE O/P EST MOD 30 MIN: CPT | Performed by: FAMILY MEDICINE

## 2019-09-11 RX ORDER — PREGABALIN 25 MG/1
25 CAPSULE ORAL DAILY
Qty: 30 CAPSULE | Refills: 1 | Status: SHIPPED | OUTPATIENT
Start: 2019-09-11 | End: 2019-11-04 | Stop reason: SDUPTHER

## 2019-09-11 NOTE — PROGRESS NOTES
Assessment/Plan:       Diagnoses and all orders for this visit:    Degeneration of lumbosacral intervertebral disc  -     pregabalin (LYRICA) 25 mg capsule; Take 1 capsule (25 mg total) by mouth daily    Testicular pain, left  Comments:  Check ultrasound, Sitz baths  Orders:  -     US scrotum and testicles; Future    Benign essential hypertension  -     CBC; Future  -     Comprehensive metabolic panel; Future  -     Lipid panel; Future    Screening PSA (prostate specific antigen)  -     PSA, Total Screen; Future        Benign essential hypertension  Continue the amlodipine, monitor blood pressure at home, recheck blood pressure in 6 months  Degeneration of lumbosacral intervertebral disc  Start Lyrica 25 mg daily at bedtime  Continue the meloxicam   He is to titrate the Lyrica up to 50 mg at bedtime if he is able to tolerate 25 mg dose  He is to call us for a new prescription in 2 weeks if he tolerates the titration  Follow-up in 6 months, sooner if he has any problems  Subjective:      Patient ID: Isi Rose is a 72 y o  male  Patient comes in today for checkup on his hypertension  He does complain of some shooting pain from his back down his right leg to his right ankle  He also notes some shooting pain in his left however does not as severe as the right  He also complains of tenderness in his left posterior testicle  He states this is chronic however it has recently worsened  He does take the meloxicam for his back and has noticed some improvement, but shooting pains remain  The following portions of the patient's history were reviewed and updated as appropriate:   He has a past medical history of GERD (gastroesophageal reflux disease), Hypertension, and Renal calculi ,  does not have any pertinent problems on file  ,   has a past surgical history that includes Anterior fusion cervical spine (25 years ago); Cervical fusion; Gallbladder surgery; Prostate surgery;  Cholecystectomy; Brain surgery; pr esophagogastroduodenoscopy transoral diagnostic (N/A, 5/1/2019); and pr colonoscopy flx dx w/collj spec when pfrmd (N/A, 5/1/2019)  ,  family history includes Cancer in his father; Diabetes in his sister; Heart disease in his mother; No Known Problems in his brother, maternal grandfather, maternal grandmother, paternal grandfather, and paternal grandmother; Stroke in his mother  ,   reports that he has never smoked  He has never used smokeless tobacco  He reports that he does not drink alcohol or use drugs  ,  is allergic to sulfamethoxazole-trimethoprim; gadolinium derivatives; cortisone; iodinated diagnostic agents; and hydrocortisone     Current Outpatient Medications   Medication Sig Dispense Refill    amLODIPine (NORVASC) 5 mg tablet Take 1 tablet (5 mg total) by mouth daily 30 tablet 5    aspirin 81 MG tablet Take by mouth      cholecalciferol (VITAMIN D3) 1,000 units tablet Take 1 tablet by mouth daily      coenzyme Q-10 100 MG capsule Take 1 capsule by mouth Daily      diazepam (VALIUM) 5 mg tablet Take 5 mg by mouth      diclofenac sodium (VOLTAREN) 1 % APPLY 4 GRAMS ON THE SKIN 4 TIMES A DAY  DO NOT APPLY TO BROKEN OR INFLAMED SKIN  1    Glucosamine 750 MG TABS Take 1 tablet by mouth daily      hydrOXYzine HCL (ATARAX) 25 mg tablet Take 1 tablet (25 mg total) by mouth every 6 (six) hours as needed for itching 30 tablet 0    meloxicam (MOBIC) 15 mg tablet TAKE 1 TABLET BY MOUTH EVERY DAY 30 tablet 0    Milk Thistle 1000 MG CAPS Take 1 capsule by mouth daily      Multiple Vitamins-Minerals (OSTEO COMPLEX PO) Take by mouth      omeprazole (PRILOSEC) 20 mg delayed release capsule Take by mouth      PROBIOTIC PRODUCT PO Take by mouth      TURMERIC PO Take 400 mg by mouth      COLLAGEN PO Take 40 mg by mouth      pregabalin (LYRICA) 25 mg capsule Take 1 capsule (25 mg total) by mouth daily 30 capsule 1     No current facility-administered medications for this visit          Review of Systems   Constitutional: Negative for chills, fatigue and fever  HENT: Negative for congestion, ear pain, hearing loss, postnasal drip, rhinorrhea and sore throat  Eyes: Negative for pain and visual disturbance  Respiratory: Negative for chest tightness, shortness of breath and wheezing  Cardiovascular: Negative for chest pain and leg swelling  Gastrointestinal: Negative for abdominal distention, abdominal pain, constipation, diarrhea and vomiting  Endocrine: Negative for cold intolerance and heat intolerance  Genitourinary: Positive for testicular pain  Negative for difficulty urinating, frequency and urgency  Musculoskeletal: Positive for back pain  Negative for arthralgias and gait problem  Skin: Negative for color change  Neurological: Negative for dizziness, tremors, syncope, numbness and headaches  Hematological: Negative for adenopathy  Psychiatric/Behavioral: Negative for agitation, confusion and sleep disturbance  The patient is not nervous/anxious  Objective:  Vitals:    09/11/19 1418   BP: 140/70   Pulse: 80   Temp: 98 2 °F (36 8 °C)   SpO2: 98%   Weight: 92 1 kg (203 lb)   Height: 5' 10" (1 778 m)     Body mass index is 29 13 kg/m²  Physical Exam   Constitutional: He is oriented to person, place, and time  He appears well-developed and well-nourished  HENT:   Head: Normocephalic  Mouth/Throat: Oropharynx is clear and moist    Eyes: Conjunctivae are normal  No scleral icterus  Neck: Normal range of motion  No thyromegaly present  Cardiovascular: Normal rate, regular rhythm and normal heart sounds  No murmur heard  Pulmonary/Chest: Effort normal and breath sounds normal  No respiratory distress  He has no wheezes  Abdominal: Soft  Bowel sounds are normal  He exhibits no distension  There is no tenderness  Genitourinary:   Genitourinary Comments: Left epididymal tenderness  Musculoskeletal: Normal range of motion  He exhibits no edema or tenderness  Lymphadenopathy:     He has no cervical adenopathy  Neurological: He is alert and oriented to person, place, and time  No cranial nerve deficit  Skin: Skin is warm and dry  No rash noted  No pallor  Psychiatric: He has a normal mood and affect  His behavior is normal    Nursing note and vitals reviewed

## 2019-09-11 NOTE — ASSESSMENT & PLAN NOTE
Start Lyrica 25 mg daily at bedtime  Continue the meloxicam   He is to titrate the Lyrica up to 50 mg at bedtime if he is able to tolerate 25 mg dose  He is to call us for a new prescription in 2 weeks if he tolerates the titration

## 2019-09-13 DIAGNOSIS — M19.90 ARTHRITIS: ICD-10-CM

## 2019-09-13 RX ORDER — MELOXICAM 15 MG/1
15 TABLET ORAL DAILY
Qty: 30 TABLET | Refills: 0 | Status: SHIPPED | OUTPATIENT
Start: 2019-09-13 | End: 2019-11-04 | Stop reason: SDUPTHER

## 2019-09-13 NOTE — TELEPHONE ENCOUNTER
T/c from spouse  He will be by later to pickup handicap form - if not can we mail?     Pt also needs refill of meloxicam

## 2019-10-03 ENCOUNTER — HOSPITAL ENCOUNTER (OUTPATIENT)
Dept: ULTRASOUND IMAGING | Facility: HOSPITAL | Age: 74
Discharge: HOME | End: 2019-10-03
Payer: COMMERCIAL

## 2019-10-03 DIAGNOSIS — N50.812 TESTICULAR PAIN, LEFT: ICD-10-CM

## 2019-10-03 PROCEDURE — 76870 US EXAM SCROTUM: CPT

## 2019-10-07 ENCOUNTER — TELEPHONE (OUTPATIENT)
Dept: FAMILY MEDICINE CLINIC | Facility: CLINIC | Age: 74
End: 2019-10-07

## 2019-10-07 NOTE — TELEPHONE ENCOUNTER
----- Message from Ozzy Bravo DO sent at 10/7/2019  8:11 AM EDT -----  Call, tests ok  How is he feeling?

## 2019-10-09 NOTE — TELEPHONE ENCOUNTER
I think this is radiating from his back, if he is able to take another lyrica without it making him too tired, I want him to do this and let us know in a week if he is feeling better

## 2019-10-23 ENCOUNTER — TRANSCRIBE ORDERS (OUTPATIENT)
Dept: ADMINISTRATIVE | Facility: HOSPITAL | Age: 74
End: 2019-10-23

## 2019-10-23 DIAGNOSIS — G58.9 PINCHED NERVE: Primary | ICD-10-CM

## 2019-11-04 DIAGNOSIS — M51.37 DEGENERATION OF LUMBOSACRAL INTERVERTEBRAL DISC: ICD-10-CM

## 2019-11-04 DIAGNOSIS — M19.90 ARTHRITIS: ICD-10-CM

## 2019-11-04 RX ORDER — MELOXICAM 15 MG/1
15 TABLET ORAL DAILY
Qty: 30 TABLET | Refills: 0 | Status: SHIPPED | OUTPATIENT
Start: 2019-11-04 | End: 2019-12-02 | Stop reason: SDUPTHER

## 2019-11-04 RX ORDER — PREGABALIN 25 MG/1
25 CAPSULE ORAL DAILY
Qty: 30 CAPSULE | Refills: 1 | Status: SHIPPED | OUTPATIENT
Start: 2019-11-04 | End: 2020-01-06 | Stop reason: SDUPTHER

## 2019-11-15 DIAGNOSIS — I10 ESSENTIAL HYPERTENSION: ICD-10-CM

## 2019-11-15 RX ORDER — AMLODIPINE BESYLATE 5 MG/1
5 TABLET ORAL DAILY
Qty: 30 TABLET | Refills: 5 | Status: SHIPPED | OUTPATIENT
Start: 2019-11-15 | End: 2020-05-26 | Stop reason: SDUPTHER

## 2019-12-02 DIAGNOSIS — M19.90 ARTHRITIS: ICD-10-CM

## 2019-12-03 DIAGNOSIS — M19.90 ARTHRITIS: ICD-10-CM

## 2019-12-03 RX ORDER — MELOXICAM 15 MG/1
TABLET ORAL
Qty: 30 TABLET | Refills: 0 | Status: SHIPPED | OUTPATIENT
Start: 2019-12-03 | End: 2019-12-03 | Stop reason: SDUPTHER

## 2019-12-04 DIAGNOSIS — M19.90 ARTHRITIS: ICD-10-CM

## 2019-12-04 RX ORDER — MELOXICAM 15 MG/1
15 TABLET ORAL DAILY
Qty: 30 TABLET | Refills: 0 | Status: SHIPPED | OUTPATIENT
Start: 2019-12-04 | End: 2019-12-04 | Stop reason: SDUPTHER

## 2019-12-04 RX ORDER — MELOXICAM 15 MG/1
15 TABLET ORAL DAILY
Qty: 30 TABLET | Refills: 0 | Status: SHIPPED | OUTPATIENT
Start: 2019-12-04 | End: 2020-01-12 | Stop reason: SDUPTHER

## 2020-01-06 DIAGNOSIS — M51.37 DEGENERATION OF LUMBOSACRAL INTERVERTEBRAL DISC: ICD-10-CM

## 2020-01-06 RX ORDER — PREGABALIN 25 MG/1
25 CAPSULE ORAL DAILY
Qty: 30 CAPSULE | Refills: 5 | Status: SHIPPED | OUTPATIENT
Start: 2020-01-06 | End: 2020-03-11 | Stop reason: SDUPTHER

## 2020-01-12 DIAGNOSIS — M19.90 ARTHRITIS: ICD-10-CM

## 2020-01-13 RX ORDER — MELOXICAM 15 MG/1
15 TABLET ORAL DAILY
Qty: 30 TABLET | Refills: 0 | Status: SHIPPED | OUTPATIENT
Start: 2020-01-13 | End: 2020-03-11 | Stop reason: SDUPTHER

## 2020-02-14 ENCOUNTER — APPOINTMENT (OUTPATIENT)
Dept: LAB | Facility: HOSPITAL | Age: 75
End: 2020-02-14
Attending: FAMILY MEDICINE
Payer: COMMERCIAL

## 2020-02-14 DIAGNOSIS — Z12.5 SCREENING PSA (PROSTATE SPECIFIC ANTIGEN): ICD-10-CM

## 2020-02-14 DIAGNOSIS — I10 BENIGN ESSENTIAL HYPERTENSION: ICD-10-CM

## 2020-02-14 LAB
ALBUMIN SERPL BCP-MCNC: 3.8 G/DL (ref 3.5–5)
ALP SERPL-CCNC: 72 U/L (ref 46–116)
ALT SERPL W P-5'-P-CCNC: 39 U/L (ref 12–78)
ANION GAP SERPL CALCULATED.3IONS-SCNC: 7 MMOL/L (ref 4–13)
AST SERPL W P-5'-P-CCNC: 24 U/L (ref 5–45)
BILIRUB SERPL-MCNC: 1 MG/DL (ref 0.2–1)
BUN SERPL-MCNC: 17 MG/DL (ref 5–25)
CALCIUM SERPL-MCNC: 8.7 MG/DL (ref 8.3–10.1)
CHLORIDE SERPL-SCNC: 103 MMOL/L (ref 100–108)
CHOLEST SERPL-MCNC: 164 MG/DL (ref 50–200)
CO2 SERPL-SCNC: 31 MMOL/L (ref 21–32)
CREAT SERPL-MCNC: 0.82 MG/DL (ref 0.6–1.3)
ERYTHROCYTE [DISTWIDTH] IN BLOOD BY AUTOMATED COUNT: 13 % (ref 11.6–15.1)
GFR SERPL CREATININE-BSD FRML MDRD: 88 ML/MIN/1.73SQ M
GLUCOSE P FAST SERPL-MCNC: 106 MG/DL (ref 65–99)
HCT VFR BLD AUTO: 50 % (ref 36.5–49.3)
HDLC SERPL-MCNC: 37 MG/DL
HGB BLD-MCNC: 16.4 G/DL (ref 12–17)
LDLC SERPL CALC-MCNC: 98 MG/DL (ref 0–100)
MCH RBC QN AUTO: 32.1 PG (ref 26.8–34.3)
MCHC RBC AUTO-ENTMCNC: 32.8 G/DL (ref 31.4–37.4)
MCV RBC AUTO: 98 FL (ref 82–98)
NONHDLC SERPL-MCNC: 127 MG/DL
PLATELET # BLD AUTO: 252 THOUSANDS/UL (ref 149–390)
PMV BLD AUTO: 9.3 FL (ref 8.9–12.7)
POTASSIUM SERPL-SCNC: 3.5 MMOL/L (ref 3.5–5.3)
PROT SERPL-MCNC: 7.5 G/DL (ref 6.4–8.2)
PSA SERPL-MCNC: 0.9 NG/ML (ref 0–4)
RBC # BLD AUTO: 5.11 MILLION/UL (ref 3.88–5.62)
SODIUM SERPL-SCNC: 141 MMOL/L (ref 136–145)
TRIGL SERPL-MCNC: 143 MG/DL
WBC # BLD AUTO: 7.62 THOUSAND/UL (ref 4.31–10.16)

## 2020-02-14 PROCEDURE — G0103 PSA SCREENING: HCPCS

## 2020-02-14 PROCEDURE — 80061 LIPID PANEL: CPT

## 2020-02-14 PROCEDURE — 85027 COMPLETE CBC AUTOMATED: CPT

## 2020-02-14 PROCEDURE — 80053 COMPREHEN METABOLIC PANEL: CPT

## 2020-02-14 PROCEDURE — 36415 COLL VENOUS BLD VENIPUNCTURE: CPT

## 2020-03-11 ENCOUNTER — OFFICE VISIT (OUTPATIENT)
Dept: FAMILY MEDICINE CLINIC | Facility: CLINIC | Age: 75
End: 2020-03-11
Payer: COMMERCIAL

## 2020-03-11 VITALS
BODY MASS INDEX: 29.49 KG/M2 | TEMPERATURE: 98.1 F | HEART RATE: 100 BPM | DIASTOLIC BLOOD PRESSURE: 68 MMHG | SYSTOLIC BLOOD PRESSURE: 128 MMHG | OXYGEN SATURATION: 94 % | HEIGHT: 70 IN | WEIGHT: 206 LBS

## 2020-03-11 DIAGNOSIS — G50.9 TRIGEMINAL NEUROPATHY: ICD-10-CM

## 2020-03-11 DIAGNOSIS — M19.90 ARTHRITIS: ICD-10-CM

## 2020-03-11 DIAGNOSIS — I10 BENIGN ESSENTIAL HYPERTENSION: Primary | ICD-10-CM

## 2020-03-11 DIAGNOSIS — M51.37 DEGENERATION OF LUMBOSACRAL INTERVERTEBRAL DISC: ICD-10-CM

## 2020-03-11 DIAGNOSIS — K21.9 GASTRO-ESOPHAGEAL REFLUX DISEASE WITHOUT ESOPHAGITIS: ICD-10-CM

## 2020-03-11 PROCEDURE — 1036F TOBACCO NON-USER: CPT | Performed by: FAMILY MEDICINE

## 2020-03-11 PROCEDURE — 1160F RVW MEDS BY RX/DR IN RCRD: CPT | Performed by: FAMILY MEDICINE

## 2020-03-11 PROCEDURE — 4040F PNEUMOC VAC/ADMIN/RCVD: CPT | Performed by: FAMILY MEDICINE

## 2020-03-11 PROCEDURE — 3074F SYST BP LT 130 MM HG: CPT | Performed by: FAMILY MEDICINE

## 2020-03-11 PROCEDURE — 3078F DIAST BP <80 MM HG: CPT | Performed by: FAMILY MEDICINE

## 2020-03-11 PROCEDURE — 3008F BODY MASS INDEX DOCD: CPT | Performed by: FAMILY MEDICINE

## 2020-03-11 PROCEDURE — 99214 OFFICE O/P EST MOD 30 MIN: CPT | Performed by: FAMILY MEDICINE

## 2020-03-11 RX ORDER — PREGABALIN 25 MG/1
25 CAPSULE ORAL DAILY
Qty: 30 CAPSULE | Refills: 0 | Status: SHIPPED | OUTPATIENT
Start: 2020-03-11 | End: 2020-05-05 | Stop reason: SDUPTHER

## 2020-03-11 RX ORDER — MELOXICAM 15 MG/1
15 TABLET ORAL DAILY
Qty: 30 TABLET | Refills: 3 | Status: SHIPPED | OUTPATIENT
Start: 2020-03-11 | End: 2020-07-09

## 2020-03-11 NOTE — PROGRESS NOTES
BMI Counseling: Body mass index is 29 56 kg/m²  The BMI is above normal  Nutrition recommendations include decreasing portion sizes and encouraging healthy choices of fruits and vegetables  Exercise recommendations include moderate physical activity 150 minutes/week  No pharmacotherapy was ordered  Assessment/Plan:       Diagnoses and all orders for this visit:    Benign essential hypertension    Degeneration of lumbosacral intervertebral disc  -     pregabalin (LYRICA) 25 mg capsule; Take 1 capsule (25 mg total) by mouth daily    Trigeminal neuropathy    Gastro-esophageal reflux disease without esophagitis    Arthritis  -     meloxicam (MOBIC) 15 mg tablet; Take 1 tablet (15 mg total) by mouth daily        Gastro-esophageal reflux disease without esophagitis  Controlled with omeprazole, continue this  Benign essential hypertension  Controlled, continue his amlodipine 5 mg daily  Trigeminal neuropathy  Well controlled using the Lyrica  Degeneration of lumbosacral intervertebral disc  Doing well, continue the meloxicam     Follow-up in 6 months, sooner if he has any problems  Subjective:      Patient ID: Neli Hall is a 69 y o  male  Patient comes in today for checkup on his neuropathy, GERD, hypertension  He continues taking medications as prescribed, no compliance issues or side effects  He did have blood work done this reviewed with him today  He would like refills of the Lyrica and the meloxicam   He states his of both working very well and would like to continue these  The following portions of the patient's history were reviewed and updated as appropriate:   He has a past medical history of GERD (gastroesophageal reflux disease), Hypertension, and Renal calculi ,  does not have any pertinent problems on file  ,   has a past surgical history that includes Anterior fusion cervical spine (25 years ago); Cervical fusion; Gallbladder surgery; Prostate surgery;  Cholecystectomy; Brain surgery; pr esophagogastroduodenoscopy transoral diagnostic (N/A, 5/1/2019); and pr colonoscopy flx dx w/collj spec when pfrmd (N/A, 5/1/2019)  ,  family history includes Cancer in his father; Diabetes in his sister; Heart disease in his mother; No Known Problems in his brother, maternal grandfather, maternal grandmother, paternal grandfather, and paternal grandmother; Stroke in his mother  ,   reports that he has never smoked  He has never used smokeless tobacco  He reports that he does not drink alcohol or use drugs  ,  is allergic to sulfamethoxazole-trimethoprim; gadolinium derivatives; cortisone; iodinated diagnostic agents; and hydrocortisone     Current Outpatient Medications   Medication Sig Dispense Refill    amLODIPine (NORVASC) 5 mg tablet Take 1 tablet (5 mg total) by mouth daily 30 tablet 5    cholecalciferol (VITAMIN D3) 1,000 units tablet Take 1 tablet by mouth daily      coenzyme Q-10 100 MG capsule Take 1 capsule by mouth Daily      diazepam (VALIUM) 5 mg tablet Take 5 mg by mouth      diclofenac sodium (VOLTAREN) 1 % APPLY 4 GRAMS ON THE SKIN 4 TIMES A DAY  DO NOT APPLY TO BROKEN OR INFLAMED SKIN  1    Glucosamine 750 MG TABS Take 1 tablet by mouth daily      hydrOXYzine HCL (ATARAX) 25 mg tablet Take 1 tablet (25 mg total) by mouth every 6 (six) hours as needed for itching 30 tablet 0    meloxicam (MOBIC) 15 mg tablet Take 1 tablet (15 mg total) by mouth daily 30 tablet 3    Milk Thistle 1000 MG CAPS Take 1 capsule by mouth daily      Multiple Vitamins-Minerals (OSTEO COMPLEX PO) Take by mouth      omeprazole (PRILOSEC) 20 mg delayed release capsule Take by mouth      pregabalin (LYRICA) 25 mg capsule Take 1 capsule (25 mg total) by mouth daily 30 capsule 0    PROBIOTIC PRODUCT PO Take by mouth      TURMERIC PO Take 400 mg by mouth      aspirin 81 MG tablet Take by mouth      COLLAGEN PO Take 40 mg by mouth       No current facility-administered medications for this visit  Review of Systems   Constitutional: Negative for chills, fatigue and fever  HENT: Negative for congestion, ear pain, hearing loss, postnasal drip, rhinorrhea and sore throat  Eyes: Negative for pain and visual disturbance  Respiratory: Negative for chest tightness, shortness of breath and wheezing  Cardiovascular: Negative for chest pain and leg swelling  Gastrointestinal: Negative for abdominal distention, abdominal pain, constipation, diarrhea and vomiting  Endocrine: Negative for cold intolerance and heat intolerance  Genitourinary: Negative for difficulty urinating, frequency and urgency  Musculoskeletal: Negative for arthralgias and gait problem  Skin: Negative for color change  Neurological: Negative for dizziness, tremors, syncope, numbness and headaches  Hematological: Negative for adenopathy  Psychiatric/Behavioral: Negative for agitation, confusion and sleep disturbance  The patient is not nervous/anxious  Objective:  Vitals:    03/11/20 1416   BP: 128/68   Pulse: 100   Temp: 98 1 °F (36 7 °C)   SpO2: 94%   Weight: 93 4 kg (206 lb)   Height: 5' 10" (1 778 m)     Body mass index is 29 56 kg/m²  Physical Exam   Constitutional: He is oriented to person, place, and time  He appears well-developed and well-nourished  HENT:   Head: Normocephalic  Mouth/Throat: Oropharynx is clear and moist    Eyes: Conjunctivae are normal  No scleral icterus  Neck: Normal range of motion  No thyromegaly present  Cardiovascular: Normal rate, regular rhythm and normal heart sounds  No murmur heard  Pulmonary/Chest: Effort normal and breath sounds normal  No respiratory distress  He has no wheezes  Abdominal: Soft  Bowel sounds are normal  He exhibits no distension  There is no tenderness  Musculoskeletal: Normal range of motion  He exhibits no edema or tenderness  Lymphadenopathy:     He has no cervical adenopathy     Neurological: He is alert and oriented to person, place, and time  No cranial nerve deficit  Skin: Skin is warm and dry  No rash noted  No pallor  Psychiatric: He has a normal mood and affect  His behavior is normal    Nursing note and vitals reviewed

## 2020-05-05 ENCOUNTER — TELEPHONE (OUTPATIENT)
Dept: FAMILY MEDICINE CLINIC | Facility: CLINIC | Age: 75
End: 2020-05-05

## 2020-05-05 DIAGNOSIS — M51.37 DEGENERATION OF LUMBOSACRAL INTERVERTEBRAL DISC: ICD-10-CM

## 2020-05-05 RX ORDER — PREGABALIN 25 MG/1
25 CAPSULE ORAL DAILY
Qty: 30 CAPSULE | Refills: 0 | Status: SHIPPED | OUTPATIENT
Start: 2020-05-05 | End: 2020-06-04

## 2020-05-26 DIAGNOSIS — I10 ESSENTIAL HYPERTENSION: ICD-10-CM

## 2020-05-26 RX ORDER — AMLODIPINE BESYLATE 5 MG/1
5 TABLET ORAL DAILY
Qty: 30 TABLET | Refills: 5 | Status: SHIPPED | OUTPATIENT
Start: 2020-05-26 | End: 2020-11-19

## 2020-06-04 DIAGNOSIS — M51.37 DEGENERATION OF LUMBOSACRAL INTERVERTEBRAL DISC: ICD-10-CM

## 2020-06-04 RX ORDER — PREGABALIN 25 MG/1
CAPSULE ORAL
Qty: 30 CAPSULE | Refills: 0 | Status: SHIPPED | OUTPATIENT
Start: 2020-06-04 | End: 2020-07-06

## 2020-07-05 DIAGNOSIS — M51.37 DEGENERATION OF LUMBOSACRAL INTERVERTEBRAL DISC: ICD-10-CM

## 2020-07-06 RX ORDER — PREGABALIN 25 MG/1
CAPSULE ORAL
Qty: 30 CAPSULE | Refills: 0 | Status: SHIPPED | OUTPATIENT
Start: 2020-07-06 | End: 2020-08-06

## 2020-07-06 NOTE — TELEPHONE ENCOUNTER
Medication:  PDMP reviewed  Active agreement on file -No      03/08/2020  1  01/06/2020  PREGABALIN 25 MG CAPSULE  30 0  30  LEAH ADELITA  [id number]  [facility] (1929)  2   [place]    02/05/2020  1  01/06/2020  PREGABALIN 25 MG CAPSULE  30 0  30  LEAH ADELITA  [id number]  [facility] (1929)  1   [place]    01/06/2020  1  01/06/2020  PREGABALIN 25 MG CAPSULE  30 0  30  LEAH ADELITA  [id number]  [facility] (1929)  0   [place]    12

## 2020-07-09 DIAGNOSIS — M19.90 ARTHRITIS: ICD-10-CM

## 2020-07-09 RX ORDER — MELOXICAM 15 MG/1
TABLET ORAL
Qty: 30 TABLET | Refills: 3 | Status: SHIPPED | OUTPATIENT
Start: 2020-07-09 | End: 2020-11-09

## 2020-08-05 DIAGNOSIS — M51.37 DEGENERATION OF LUMBOSACRAL INTERVERTEBRAL DISC: ICD-10-CM

## 2020-08-06 RX ORDER — PREGABALIN 25 MG/1
CAPSULE ORAL
Qty: 30 CAPSULE | Refills: 0 | Status: SHIPPED | OUTPATIENT
Start: 2020-08-06 | End: 2020-09-08

## 2020-08-06 NOTE — TELEPHONE ENCOUNTER
Medication:  PDMP reviewed  Active agreement on file -No      03/08/2020  1  01/06/2020  PREGABALIN 25 MG CAPSULE  30 0  30  LEAH ADELITA  [id number]  [facility] (1929)  2   [place]    02/05/2020  1  01/06/2020  PREGABALIN 25 MG CAPSULE  30 0  30  LEAH ADELITA  [id number]  [facility] (1929)  1   [place]    01/06/2020  1  01/06/2020  PREGABALIN 25 MG CAPSULE  30 0  30  LEAH ADELITA  [id number]  [facility] (1929)  0   [place]    12/11/2019  1  12/11/2019  ACETAMINOPHEN-COD #3 TABLET  18 0  3  LE CAR  [id number]  [facility] (1929)  0  27  0 MME  [place]    12/03/2019  1  11/04/2019  PREGABALIN 25 MG CAPSULE  30 0  30  LEAH ADELITA  [id number]  [facility] (1929)  0   [place]

## 2020-09-05 DIAGNOSIS — M51.37 DEGENERATION OF LUMBOSACRAL INTERVERTEBRAL DISC: ICD-10-CM

## 2020-09-08 RX ORDER — PREGABALIN 25 MG/1
CAPSULE ORAL
Qty: 30 CAPSULE | Refills: 0 | Status: SHIPPED | OUTPATIENT
Start: 2020-09-08 | End: 2020-09-16

## 2020-09-16 ENCOUNTER — OFFICE VISIT (OUTPATIENT)
Dept: FAMILY MEDICINE CLINIC | Facility: CLINIC | Age: 75
End: 2020-09-16
Payer: COMMERCIAL

## 2020-09-16 VITALS
HEART RATE: 96 BPM | OXYGEN SATURATION: 94 % | WEIGHT: 206 LBS | BODY MASS INDEX: 29.49 KG/M2 | DIASTOLIC BLOOD PRESSURE: 74 MMHG | TEMPERATURE: 97.4 F | HEIGHT: 70 IN | SYSTOLIC BLOOD PRESSURE: 130 MMHG

## 2020-09-16 DIAGNOSIS — Z13.6 SCREENING FOR CARDIOVASCULAR CONDITION: ICD-10-CM

## 2020-09-16 DIAGNOSIS — Z12.5 SCREENING PSA (PROSTATE SPECIFIC ANTIGEN): ICD-10-CM

## 2020-09-16 DIAGNOSIS — G50.9 TRIGEMINAL NEUROPATHY: ICD-10-CM

## 2020-09-16 DIAGNOSIS — I10 BENIGN ESSENTIAL HYPERTENSION: Primary | ICD-10-CM

## 2020-09-16 DIAGNOSIS — M51.37 DEGENERATION OF LUMBOSACRAL INTERVERTEBRAL DISC: ICD-10-CM

## 2020-09-16 PROBLEM — R19.7 DIARRHEA: Status: RESOLVED | Noted: 2019-04-11 | Resolved: 2020-09-16

## 2020-09-16 PROBLEM — R51.9 HEAD AND FACE PAIN: Status: RESOLVED | Noted: 2017-11-15 | Resolved: 2020-09-16

## 2020-09-16 PROBLEM — M99.9 NONALLOPATHIC LESION OF OCCIPITOCERVICAL REGION: Status: RESOLVED | Noted: 2017-09-11 | Resolved: 2020-09-16

## 2020-09-16 PROCEDURE — 99214 OFFICE O/P EST MOD 30 MIN: CPT | Performed by: FAMILY MEDICINE

## 2020-09-16 RX ORDER — PREGABALIN 50 MG/1
50 CAPSULE ORAL 2 TIMES DAILY
Qty: 60 CAPSULE | Refills: 3 | Status: SHIPPED | OUTPATIENT
Start: 2020-09-16 | End: 2021-04-14 | Stop reason: SDUPTHER

## 2020-09-16 NOTE — PROGRESS NOTES
Assessment/Plan:         Problem List Items Addressed This Visit        Cardiovascular and Mediastinum    Benign essential hypertension - Primary     Controlled, continue his amlodipine, continue followed home  Relevant Orders    CBC    Comprehensive metabolic panel       Nervous and Auditory    Trigeminal neuropathy     Increase his Lyrica to 50 mg twice daily  Musculoskeletal and Integument    Degeneration of lumbosacral intervertebral disc     Continue meloxicam, increase his Lyrica         Relevant Medications    pregabalin (LYRICA) 50 mg capsule      Other Visit Diagnoses     Screening PSA (prostate specific antigen)        Relevant Orders    PSA, Total Screen    Screening for cardiovascular condition        Relevant Orders    Lipid panel            Subjective:      Patient ID: Richie Swift is a 69 y o  male  Patient comes in today for follow-up on his hypertension, trigeminal neuralgia, lumbar degenerative disc disease with peripheral neuropathy  He states that he did start taking the Lyrica 25 mg daily has not noticed a significant change since he did this  He does continue to take his meloxicam   He is taking his amlodipine as prescribed, no compliance issues or side effects  The following portions of the patient's history were reviewed and updated as appropriate:   He has a past medical history of GERD (gastroesophageal reflux disease), Hypertension, and Renal calculi ,  does not have any pertinent problems on file  ,   has a past surgical history that includes Anterior fusion cervical spine (25 years ago); Cervical fusion; Gallbladder surgery; Prostate surgery; Cholecystectomy; Brain surgery; pr esophagogastroduodenoscopy transoral diagnostic (N/A, 5/1/2019); and pr colonoscopy flx dx w/collj spec when pfrmd (N/A, 5/1/2019)  ,  family history includes Cancer in his father; Diabetes in his sister;  Heart disease in his mother; No Known Problems in his brother, maternal grandfather, maternal grandmother, paternal grandfather, and paternal grandmother; Stroke in his mother  ,   reports that he has never smoked  He has never used smokeless tobacco  He reports that he does not drink alcohol or use drugs  ,  is allergic to sulfamethoxazole-trimethoprim; gadolinium derivatives; cortisone; iodinated diagnostic agents; and hydrocortisone     Current Outpatient Medications   Medication Sig Dispense Refill    amLODIPine (NORVASC) 5 mg tablet Take 1 tablet (5 mg total) by mouth daily 30 tablet 5    cholecalciferol (VITAMIN D3) 1,000 units tablet Take 1 tablet by mouth daily      coenzyme Q-10 100 MG capsule Take 1 capsule by mouth Daily      Glucosamine 750 MG TABS Take 1 tablet by mouth daily      meloxicam (MOBIC) 15 mg tablet TAKE 1 TABLET BY MOUTH EVERY DAY 30 tablet 3    Milk Thistle 1000 MG CAPS Take 1 capsule by mouth daily      Multiple Vitamins-Minerals (OSTEO COMPLEX PO) Take by mouth      omeprazole (PRILOSEC) 20 mg delayed release capsule Take by mouth      PROBIOTIC PRODUCT PO Take by mouth      TURMERIC PO Take 400 mg by mouth      aspirin 81 MG tablet Take by mouth      COLLAGEN PO Take 40 mg by mouth      diazepam (VALIUM) 5 mg tablet Take 5 mg by mouth      diclofenac sodium (VOLTAREN) 1 % APPLY 4 GRAMS ON THE SKIN 4 TIMES A DAY  DO NOT APPLY TO BROKEN OR INFLAMED SKIN  1    hydrOXYzine HCL (ATARAX) 25 mg tablet Take 1 tablet (25 mg total) by mouth every 6 (six) hours as needed for itching (Patient not taking: Reported on 9/16/2020) 30 tablet 0    pregabalin (LYRICA) 50 mg capsule Take 1 capsule (50 mg total) by mouth 2 (two) times a day 60 capsule 3     No current facility-administered medications for this visit  Review of Systems   Constitutional: Negative for chills, fatigue and fever  HENT: Negative for congestion, ear pain, hearing loss, postnasal drip, rhinorrhea and sore throat  Eyes: Negative for pain and visual disturbance     Respiratory: Negative for chest tightness, shortness of breath and wheezing  Cardiovascular: Negative for chest pain and leg swelling  Gastrointestinal: Negative for abdominal distention, abdominal pain, constipation, diarrhea and vomiting  Endocrine: Negative for cold intolerance and heat intolerance  Genitourinary: Negative for difficulty urinating, frequency and urgency  Musculoskeletal: Positive for back pain  Negative for arthralgias and gait problem  Skin: Negative for color change  Neurological: Negative for dizziness, tremors, syncope, numbness and headaches  Hematological: Negative for adenopathy  Psychiatric/Behavioral: Negative for agitation, confusion and sleep disturbance  The patient is not nervous/anxious  Objective:  Vitals:    09/16/20 1513   BP: 130/74   Pulse: 96   Temp: (!) 97 4 °F (36 3 °C)   SpO2: 94%   Weight: 93 4 kg (206 lb)   Height: 5' 10" (1 778 m)     Body mass index is 29 56 kg/m²  Physical Exam  Vitals signs and nursing note reviewed  Constitutional:       Appearance: He is well-developed  HENT:      Head: Normocephalic  Eyes:      General: No scleral icterus  Conjunctiva/sclera: Conjunctivae normal    Neck:      Musculoskeletal: Normal range of motion  Thyroid: No thyromegaly  Cardiovascular:      Rate and Rhythm: Normal rate and regular rhythm  Heart sounds: Normal heart sounds  No murmur  Pulmonary:      Effort: Pulmonary effort is normal  No respiratory distress  Breath sounds: Normal breath sounds  No wheezing  Abdominal:      General: Bowel sounds are normal  There is no distension  Palpations: Abdomen is soft  Tenderness: There is no abdominal tenderness  Musculoskeletal: Normal range of motion  General: No tenderness  Lymphadenopathy:      Cervical: No cervical adenopathy  Skin:     General: Skin is warm and dry  Coloration: Skin is not pale  Findings: No rash     Neurological:      Mental Status: He is alert and oriented to person, place, and time  Cranial Nerves: No cranial nerve deficit     Psychiatric:         Behavior: Behavior normal

## 2020-11-09 DIAGNOSIS — M19.90 ARTHRITIS: ICD-10-CM

## 2020-11-09 RX ORDER — MELOXICAM 15 MG/1
TABLET ORAL
Qty: 30 TABLET | Refills: 3 | Status: SHIPPED | OUTPATIENT
Start: 2020-11-09 | End: 2021-03-09

## 2020-11-19 DIAGNOSIS — I10 ESSENTIAL HYPERTENSION: ICD-10-CM

## 2020-11-19 RX ORDER — AMLODIPINE BESYLATE 5 MG/1
TABLET ORAL
Qty: 90 TABLET | Refills: 1 | Status: SHIPPED | OUTPATIENT
Start: 2020-11-19 | End: 2021-05-26 | Stop reason: SDUPTHER

## 2021-03-05 DIAGNOSIS — Z23 ENCOUNTER FOR IMMUNIZATION: ICD-10-CM

## 2021-03-09 DIAGNOSIS — M19.90 ARTHRITIS: ICD-10-CM

## 2021-03-09 RX ORDER — MELOXICAM 15 MG/1
TABLET ORAL
Qty: 30 TABLET | Refills: 3 | Status: SHIPPED | OUTPATIENT
Start: 2021-03-09 | End: 2021-03-17 | Stop reason: SDUPTHER

## 2021-03-16 ENCOUNTER — APPOINTMENT (OUTPATIENT)
Dept: LAB | Facility: HOSPITAL | Age: 76
End: 2021-03-16
Attending: FAMILY MEDICINE
Payer: COMMERCIAL

## 2021-03-16 DIAGNOSIS — Z12.5 SCREENING PSA (PROSTATE SPECIFIC ANTIGEN): ICD-10-CM

## 2021-03-16 DIAGNOSIS — I10 BENIGN ESSENTIAL HYPERTENSION: ICD-10-CM

## 2021-03-16 DIAGNOSIS — Z13.6 SCREENING FOR CARDIOVASCULAR CONDITION: ICD-10-CM

## 2021-03-16 LAB
ALBUMIN SERPL BCP-MCNC: 3.9 G/DL (ref 3.5–5)
ALP SERPL-CCNC: 77 U/L (ref 46–116)
ALT SERPL W P-5'-P-CCNC: 57 U/L (ref 12–78)
ANION GAP SERPL CALCULATED.3IONS-SCNC: 8 MMOL/L (ref 4–13)
AST SERPL W P-5'-P-CCNC: 36 U/L (ref 5–45)
BILIRUB SERPL-MCNC: 0.86 MG/DL (ref 0.2–1)
BUN SERPL-MCNC: 17 MG/DL (ref 5–25)
CALCIUM SERPL-MCNC: 9.1 MG/DL (ref 8.3–10.1)
CHLORIDE SERPL-SCNC: 103 MMOL/L (ref 100–108)
CHOLEST SERPL-MCNC: 184 MG/DL (ref 50–200)
CO2 SERPL-SCNC: 29 MMOL/L (ref 21–32)
CREAT SERPL-MCNC: 0.92 MG/DL (ref 0.6–1.3)
ERYTHROCYTE [DISTWIDTH] IN BLOOD BY AUTOMATED COUNT: 13.4 % (ref 11.6–15.1)
GFR SERPL CREATININE-BSD FRML MDRD: 82 ML/MIN/1.73SQ M
GLUCOSE P FAST SERPL-MCNC: 119 MG/DL (ref 65–99)
HCT VFR BLD AUTO: 52.5 % (ref 36.5–49.3)
HDLC SERPL-MCNC: 37 MG/DL
HGB BLD-MCNC: 17.3 G/DL (ref 12–17)
LDLC SERPL CALC-MCNC: 120 MG/DL (ref 0–100)
MCH RBC QN AUTO: 31.9 PG (ref 26.8–34.3)
MCHC RBC AUTO-ENTMCNC: 33 G/DL (ref 31.4–37.4)
MCV RBC AUTO: 97 FL (ref 82–98)
NONHDLC SERPL-MCNC: 147 MG/DL
PLATELET # BLD AUTO: 238 THOUSANDS/UL (ref 149–390)
PMV BLD AUTO: 9.2 FL (ref 8.9–12.7)
POTASSIUM SERPL-SCNC: 3.9 MMOL/L (ref 3.5–5.3)
PROT SERPL-MCNC: 7.9 G/DL (ref 6.4–8.2)
PSA SERPL-MCNC: 0.9 NG/ML (ref 0–4)
RBC # BLD AUTO: 5.42 MILLION/UL (ref 3.88–5.62)
SODIUM SERPL-SCNC: 140 MMOL/L (ref 136–145)
TRIGL SERPL-MCNC: 135 MG/DL
WBC # BLD AUTO: 7.33 THOUSAND/UL (ref 4.31–10.16)

## 2021-03-16 PROCEDURE — G0103 PSA SCREENING: HCPCS

## 2021-03-16 PROCEDURE — 80061 LIPID PANEL: CPT

## 2021-03-16 PROCEDURE — 80053 COMPREHEN METABOLIC PANEL: CPT

## 2021-03-16 PROCEDURE — 85027 COMPLETE CBC AUTOMATED: CPT

## 2021-03-16 PROCEDURE — 36415 COLL VENOUS BLD VENIPUNCTURE: CPT

## 2021-03-17 ENCOUNTER — OFFICE VISIT (OUTPATIENT)
Dept: FAMILY MEDICINE CLINIC | Facility: CLINIC | Age: 76
End: 2021-03-17
Payer: COMMERCIAL

## 2021-03-17 VITALS
DIASTOLIC BLOOD PRESSURE: 64 MMHG | OXYGEN SATURATION: 96 % | BODY MASS INDEX: 29.63 KG/M2 | HEART RATE: 96 BPM | WEIGHT: 207 LBS | HEIGHT: 70 IN | SYSTOLIC BLOOD PRESSURE: 124 MMHG | TEMPERATURE: 97.9 F

## 2021-03-17 DIAGNOSIS — F41.9 ANXIETY: ICD-10-CM

## 2021-03-17 DIAGNOSIS — I10 BENIGN ESSENTIAL HYPERTENSION: Primary | ICD-10-CM

## 2021-03-17 DIAGNOSIS — M51.37 DEGENERATION OF LUMBOSACRAL INTERVERTEBRAL DISC: ICD-10-CM

## 2021-03-17 DIAGNOSIS — G50.9 TRIGEMINAL NEUROPATHY: ICD-10-CM

## 2021-03-17 DIAGNOSIS — M19.90 ARTHRITIS: ICD-10-CM

## 2021-03-17 DIAGNOSIS — M54.12 CERVICAL RADICULITIS: ICD-10-CM

## 2021-03-17 PROCEDURE — 99214 OFFICE O/P EST MOD 30 MIN: CPT | Performed by: FAMILY MEDICINE

## 2021-03-17 RX ORDER — DIAZEPAM 5 MG/1
5 TABLET ORAL DAILY
Qty: 30 TABLET | Refills: 1 | Status: SHIPPED | OUTPATIENT
Start: 2021-03-17

## 2021-03-17 RX ORDER — MELOXICAM 15 MG/1
15 TABLET ORAL DAILY
Qty: 30 TABLET | Refills: 3 | Status: SHIPPED | OUTPATIENT
Start: 2021-03-17 | End: 2021-09-17

## 2021-03-17 NOTE — PROGRESS NOTES
Assessment/Plan:         Problem List Items Addressed This Visit        Cardiovascular and Mediastinum    Benign essential hypertension - Primary     Controlled, continue his amlodipine            Nervous and Auditory    Cervical radiculitis     Doing very well with the Lyrica and the meloxicam          Trigeminal neuropathy     Well controlled with the Lyrica  Musculoskeletal and Integument    Degeneration of lumbosacral intervertebral disc     Continue meloxicam             Other    Anxiety     Continue the diazepam as needed, he is using this very sparingly  He states this is working well  He also uses hydroxyzine as needed for mild anxiety  Relevant Medications    diazepam (VALIUM) 5 mg tablet      Other Visit Diagnoses     Arthritis        Relevant Medications    meloxicam (MOBIC) 15 mg tablet            Subjective:      Patient ID: Wandy Chong is a 70 y o  male  Patient comes in today for checkup on his hypertension, cervical and lumbar radiculopathy, trigeminal neuralgia, anxiety  Patient states that he has been feeling well, he did have blood work done, and this reviewed with him today  Overall, he states he is feeling very well  The following portions of the patient's history were reviewed and updated as appropriate:   Past Medical History:  He has a past medical history of GERD (gastroesophageal reflux disease), Hypertension, and Renal calculi ,  _______________________________________________________________________  Medical Problems:  does not have any pertinent problems on file ,  _______________________________________________________________________  Past Surgical History:   has a past surgical history that includes Anterior fusion cervical spine (25 years ago); Cervical fusion; Gallbladder surgery; Prostate surgery;  Cholecystectomy; Brain surgery; pr esophagogastroduodenoscopy transoral diagnostic (N/A, 5/1/2019); and pr colonoscopy flx dx w/collj spec when pfrmd (N/A, 5/1/2019)  ,  _______________________________________________________________________  Family History:  family history includes Cancer in his father; Diabetes in his sister; Heart disease in his mother; No Known Problems in his brother, maternal grandfather, maternal grandmother, paternal grandfather, and paternal grandmother; Stroke in his mother ,  _______________________________________________________________________  Social History:   reports that he has never smoked  He has never used smokeless tobacco  He reports that he does not drink alcohol or use drugs  ,  _______________________________________________________________________  Allergies:  is allergic to sulfamethoxazole-trimethoprim; gadolinium derivatives; cortisone; iodinated diagnostic agents; and hydrocortisone     _______________________________________________________________________  Current Outpatient Medications   Medication Sig Dispense Refill    amLODIPine (NORVASC) 5 mg tablet TAKE 1 TABLET BY MOUTH EVERY DAY 90 tablet 1    aspirin 81 MG tablet Take by mouth      cholecalciferol (VITAMIN D3) 1,000 units tablet Take 1 tablet by mouth daily      coenzyme Q-10 100 MG capsule Take 1 capsule by mouth Daily      COLLAGEN PO Take 40 mg by mouth      diazepam (VALIUM) 5 mg tablet Take 1 tablet (5 mg total) by mouth daily 30 tablet 1    diclofenac sodium (VOLTAREN) 1 % APPLY 4 GRAMS ON THE SKIN 4 TIMES A DAY   DO NOT APPLY TO BROKEN OR INFLAMED SKIN  1    Glucosamine 750 MG TABS Take 1 tablet by mouth daily      hydrOXYzine HCL (ATARAX) 25 mg tablet Take 1 tablet (25 mg total) by mouth every 6 (six) hours as needed for itching 30 tablet 0    meloxicam (MOBIC) 15 mg tablet Take 1 tablet (15 mg total) by mouth daily 30 tablet 3    Milk Thistle 1000 MG CAPS Take 1 capsule by mouth daily      Multiple Vitamins-Minerals (OSTEO COMPLEX PO) Take by mouth      omeprazole (PRILOSEC) 20 mg delayed release capsule Take by mouth      pregabalin (LYRICA) 50 mg capsule Take 1 capsule (50 mg total) by mouth 2 (two) times a day 60 capsule 3    PROBIOTIC PRODUCT PO Take by mouth      TURMERIC PO Take 400 mg by mouth       No current facility-administered medications for this visit       _______________________________________________________________________  Review of Systems   Constitutional: Negative for chills, fatigue and fever  HENT: Negative for congestion, ear pain, hearing loss, postnasal drip, rhinorrhea and sore throat  Eyes: Negative for pain and visual disturbance  Respiratory: Negative for chest tightness, shortness of breath and wheezing  Cardiovascular: Negative for chest pain and leg swelling  Gastrointestinal: Negative for abdominal distention, abdominal pain, constipation, diarrhea and vomiting  Endocrine: Negative for cold intolerance and heat intolerance  Genitourinary: Negative for difficulty urinating, frequency and urgency  Musculoskeletal: Negative for arthralgias and gait problem  Skin: Negative for color change  Neurological: Negative for dizziness, tremors, syncope, numbness and headaches  Hematological: Negative for adenopathy  Psychiatric/Behavioral: Negative for agitation, confusion and sleep disturbance  The patient is not nervous/anxious  Objective:  Vitals:    03/17/21 1503   BP: 124/64   BP Location: Left arm   Patient Position: Sitting   Cuff Size: Adult   Pulse: 96   Temp: 97 9 °F (36 6 °C)   SpO2: 96%   Weight: 93 9 kg (207 lb)   Height: 5' 10" (1 778 m)     Body mass index is 29 7 kg/m²  Physical Exam  Vitals signs and nursing note reviewed  Constitutional:       Appearance: He is well-developed  HENT:      Head: Normocephalic  Eyes:      General: No scleral icterus  Conjunctiva/sclera: Conjunctivae normal    Neck:      Musculoskeletal: Normal range of motion  Thyroid: No thyromegaly  Cardiovascular:      Rate and Rhythm: Normal rate and regular rhythm  Heart sounds: Normal heart sounds  No murmur  Pulmonary:      Effort: Pulmonary effort is normal  No respiratory distress  Breath sounds: Normal breath sounds  No wheezing  Abdominal:      General: Bowel sounds are normal  There is no distension  Palpations: Abdomen is soft  Tenderness: There is no abdominal tenderness  Musculoskeletal: Normal range of motion  General: No tenderness  Lymphadenopathy:      Cervical: No cervical adenopathy  Skin:     General: Skin is warm and dry  Coloration: Skin is not pale  Findings: No rash  Neurological:      Mental Status: He is alert and oriented to person, place, and time  Cranial Nerves: No cranial nerve deficit     Psychiatric:         Behavior: Behavior normal

## 2021-03-17 NOTE — ASSESSMENT & PLAN NOTE
Continue the diazepam as needed, he is using this very sparingly  He states this is working well  He also uses hydroxyzine as needed for mild anxiety

## 2021-04-14 DIAGNOSIS — M51.37 DEGENERATION OF LUMBOSACRAL INTERVERTEBRAL DISC: ICD-10-CM

## 2021-04-14 RX ORDER — PREGABALIN 50 MG/1
50 CAPSULE ORAL 2 TIMES DAILY
Qty: 60 CAPSULE | Refills: 3 | Status: SHIPPED | OUTPATIENT
Start: 2021-04-14 | End: 2021-12-08

## 2021-04-14 NOTE — TELEPHONE ENCOUNTER
Medication:  PDMP   02/22/2021  1  02/22/2021  ZOLPIDEM TARTRATE 5 MG TABLET  30 0  30  TI ENEDELIA  [id number]  [facility] ([id number])  0   [place]    01/15/2021  2  01/15/2021  ZOLPIDEM TARTRATE 5 MG TABLET  30 0  30  KI GRA  [id number]  [facility] ([id number])  0   [place]    11/24/2020  2  11/06/2020  ZOLPIDEM TARTRATE 10 MG TABLET  30 0  30  LEAH ADELITA  [id number]  [facility] ([id number])  0   [place]    10/01/2020  2  09/29/2020  ZOLPIDEM TARTRATE 10 MG TABLET  30 0  30  LEAH ADELITA  [id number]  [facility] ([id number])  0   [place]    08/26/2020  2  08/24/2020  ZOLPIDEM TARTRATE 10 MG TABLET  30 0  30  LEAH ADELITA  [id number]  [facility] ([id number])  0   [place]        Active agreement on file -No

## 2021-05-10 ENCOUNTER — OFFICE VISIT (OUTPATIENT)
Dept: CARDIOLOGY CLINIC | Facility: CLINIC | Age: 76
End: 2021-05-10
Payer: COMMERCIAL

## 2021-05-10 VITALS
DIASTOLIC BLOOD PRESSURE: 72 MMHG | BODY MASS INDEX: 28.73 KG/M2 | SYSTOLIC BLOOD PRESSURE: 124 MMHG | HEART RATE: 79 BPM | WEIGHT: 200.7 LBS | HEIGHT: 70 IN

## 2021-05-10 DIAGNOSIS — E78.00 HYPERCHOLESTEROLEMIA: ICD-10-CM

## 2021-05-10 DIAGNOSIS — R06.00 DOE (DYSPNEA ON EXERTION): ICD-10-CM

## 2021-05-10 DIAGNOSIS — I10 BENIGN ESSENTIAL HYPERTENSION: Primary | ICD-10-CM

## 2021-05-10 PROCEDURE — 99204 OFFICE O/P NEW MOD 45 MIN: CPT | Performed by: INTERNAL MEDICINE

## 2021-05-10 PROCEDURE — 93000 ELECTROCARDIOGRAM COMPLETE: CPT | Performed by: INTERNAL MEDICINE

## 2021-05-10 NOTE — PROGRESS NOTES
[facility]  Office Consultation  Joel Solitario 70 y o  male MRN: [id number]        Problems    1  Benign essential hypertension     2  Hypercholesterolemia  POCT ECG   3  DOMÍNGUEZ (dyspnea on exertion)  Echo complete with contrast if indicated    Stress test only, exercise    XR chest pa & lateral       Impression:     Kathleen Menchaca comes to see me for primary prevention of heart disease but also because of recent progressive dyspnea on exertion      hypertension   o Well controlled on amlodipine   Hypercholesterolemia  o  while lipids are not significantly elevated, ASCVD risk is high at 26%   Dyspnea on exertion  o  progressive in the last few months to a year, notices particularly with mowing the lawn  o Denies associated edema, chest pain, chest pressure, palpitations  o Examination does not show any volume overload   o CBC is normal    Plan     Will obtain a chest x-ray, routine treadmill stress test to screen for CAD, and echocardiogram considering the dyspnea   Will follow-up on the testing, any abnormalities will be discussed and based on results will decide on the need to return to my office   We also discussed his ASCVD risk at 26%, and that statin therapy should further be discussed with his PCP and if he is willing to utilize this as an additional risk reduction strategy for heart disease, it would be advisable from my standpoint   The occasional Excedrin that he takes has enough aspirin in it that he does not need to take an additional 81 mg daily  Reason for Consult / Principal Problem:  Dyspnea on exertion    HPI: Joel Solitario is a 70y o  year old male  With premature coronary artery disease in his mom started age 48, who also had a stroke, personal history of hypertension, relatively normal lipids but high ASCVD risk comes to see me for primary prevention but also because of recent slow progressive symptoms of dyspnea on exertion    He denies edema orthopnea, lightheadedness or dizziness, palpitations, syncope  He denies chest pain or chest pressure  He has no claudication symptoms  He denies any melena or blood per rectum  Denies any fevers or chills  He has hypertension, very well controlled on amlodipine  He has an LDL of 123, but this correlates to a high ASCVD risk of 26% the was discussed in detail with him today  Review of Systems   Constitutional: Negative  HENT: Negative  Eyes: Negative  Respiratory: Positive for shortness of breath  Cardiovascular: Negative  Gastrointestinal: Negative  Endocrine: Negative  Genitourinary: Negative  Musculoskeletal: Negative  Skin: Negative  Neurological: Negative  Hematological: Negative  Psychiatric/Behavioral: Negative  Past Medical History:   Diagnosis Date    GERD (gastroesophageal reflux disease)     Hypertension     Renal calculi      Past Surgical History:   Procedure Laterality Date    ANTERIOR FUSION CERVICAL SPINE  25 years ago    C5, C6, C7    BRAIN SURGERY      CERVICAL FUSION      vertebral     CHOLECYSTECTOMY      GALLBLADDER SURGERY      [place] COLONOSCOPY FLX DX W/COLLJ SPEC WHEN PFRMD N/A 5/1/2019    Procedure: COLONOSCOPY;  Surgeon: Luh Schneider MD;  Location[facility]; Service: Gastroenterology    [place] ESOPHAGOGASTRODUODENOSCOPY TRANSORAL DIAGNOSTIC N/A 5/1/2019    Procedure: ESOPHAGOGASTRODUODENOSCOPY (EGD); Surgeon: Luh Schneider MD;  Location: [facility];   Service: Gastroenterology    PROSTATE SURGERY       Social History     Substance and Sexual Activity   Alcohol Use No     Social History     Substance and Sexual Activity   Drug Use No     Social History     Tobacco Use   Smoking Status Never Smoker   Smokeless Tobacco Never Used     Family History   Problem Relation Age of Onset    Heart disease Mother     Stroke Mother     Cancer Father     Diabetes Sister         mellitus     No Known Problems Brother     No Known Problems Maternal Grandmother     No Known Problems Maternal Grandfather     No Known Problems Paternal Grandmother     No Known Problems Paternal Grandfather        Allergies: Allergies   Allergen Reactions    Sulfamethoxazole-Trimethoprim Rash    Gadolinium Derivatives Hives and Itching     Reaction Date: 22Aug2017; Action Taken: Radiologist examined pt, he was told to return to ER with any worsening of hives  Will need MRI prep going forward ;     Cortisone      hives    Iodinated Diagnostic Agents     Hydrocortisone Rash     Category: Allergy; Medications:     Current Outpatient Medications:     amLODIPine (NORVASC) 5 mg tablet, TAKE 1 TABLET BY MOUTH EVERY DAY, Disp: 90 tablet, Rfl: 1    aspirin 81 MG tablet, Take by mouth, Disp: , Rfl:     cholecalciferol (VITAMIN D3) 1,000 units tablet, Take 1 tablet by mouth daily, Disp: , Rfl:     coenzyme Q-10 100 MG capsule, Take 1 capsule by mouth Daily, Disp: , Rfl:     diazepam (VALIUM) 5 mg tablet, Take 1 tablet (5 mg total) by mouth daily, Disp: 30 tablet, Rfl: 1    diclofenac sodium (VOLTAREN) 1 %, APPLY 4 GRAMS ON THE SKIN 4 TIMES A DAY   DO NOT APPLY TO BROKEN OR INFLAMED SKIN, Disp: , Rfl: 1    Glucosamine 750 MG TABS, Take 1 tablet by mouth daily, Disp: , Rfl:     meloxicam (MOBIC) 15 mg tablet, Take 1 tablet (15 mg total) by mouth daily, Disp: 30 tablet, Rfl: 3    Milk Thistle 1000 MG CAPS, Take 1 capsule by mouth daily, Disp: , Rfl:     Multiple Vitamins-Minerals (OSTEO COMPLEX PO), Take by mouth, Disp: , Rfl:     omeprazole (PRILOSEC) 20 mg delayed release capsule, Take by mouth, Disp: , Rfl:     pregabalin (LYRICA) 50 mg capsule, Take 1 capsule (50 mg total) by mouth 2 (two) times a day, Disp: 60 capsule, Rfl: 3    PROBIOTIC PRODUCT PO, Take by mouth, Disp: , Rfl:     TURMERIC PO, Take 400 mg by mouth, Disp: , Rfl:       Vitals:    05/10/21 1312   BP: 124/72   Pulse: 79     Weight (last 2 days)     Date/Time   Weight    05/10/21 1312   91 (200 7)            Physical Exam  Constitutional:       General: He is not in acute distress  Appearance: Normal appearance  He is well-developed  He is not diaphoretic  HENT:      Head: Normocephalic and atraumatic  Eyes:      General: No scleral icterus  Conjunctiva/sclera: Conjunctivae normal       Pupils: Pupils are equal, round, and reactive to light  Neck:      Musculoskeletal: Normal range of motion and neck supple  Thyroid: No thyromegaly  Vascular: No JVD  Cardiovascular:      Rate and Rhythm: Normal rate and regular rhythm  Heart sounds: Normal heart sounds  No murmur  No friction rub  No gallop  Pulmonary:      Effort: Pulmonary effort is normal  No respiratory distress  Breath sounds: Normal breath sounds  No wheezing or rales  Abdominal:      General: Bowel sounds are normal  There is no distension  Palpations: Abdomen is soft  Tenderness: There is no abdominal tenderness  Musculoskeletal: Normal range of motion  General: No deformity  Right lower leg: No edema  Left lower leg: No edema  Skin:     General: Skin is warm and dry  Findings: No erythema or rash  Neurological:      General: No focal deficit present  Mental Status: He is alert and oriented to person, place, and time  Cranial Nerves: No cranial nerve deficit  Motor: No weakness  Laboratory Studies:    Laboratory studies personally reviewed    Cardiac testing:     EKG reviewed personally:   Results for orders placed or performed in visit on 05/10/21   POCT ECG    Impression      Sinus rhythm   Nonspecific ST abnormality         Echocardiogram:      Stress test:      Holter:      Cardiac catheterization:        Teodora Dubin, MD    Portions of the record may have been created with voice recognition software  Occasional wrong word or "sound a like" substitutions may have occurred due to the inherent limitations of voice recognition software  Read the chart carefully and recognize, using context, where substitutions have occurred

## 2021-05-26 DIAGNOSIS — I10 ESSENTIAL HYPERTENSION: ICD-10-CM

## 2021-05-26 RX ORDER — AMLODIPINE BESYLATE 5 MG/1
5 TABLET ORAL DAILY
Qty: 90 TABLET | Refills: 1 | Status: SHIPPED | OUTPATIENT
Start: 2021-05-26 | End: 2021-11-18

## 2021-07-09 ENCOUNTER — HOSPITAL ENCOUNTER (OUTPATIENT)
Dept: NON INVASIVE DIAGNOSTICS | Facility: CLINIC | Age: 76
Discharge: HOME | End: 2021-07-09
Payer: COMMERCIAL

## 2021-07-09 DIAGNOSIS — R06.00 DOE (DYSPNEA ON EXERTION): ICD-10-CM

## 2021-07-09 PROCEDURE — 93018 CV STRESS TEST I&R ONLY: CPT | Performed by: INTERNAL MEDICINE

## 2021-07-09 PROCEDURE — 93016 CV STRESS TEST SUPVJ ONLY: CPT | Performed by: INTERNAL MEDICINE

## 2021-07-09 PROCEDURE — 93306 TTE W/DOPPLER COMPLETE: CPT | Performed by: INTERNAL MEDICINE

## 2021-07-09 PROCEDURE — 93017 CV STRESS TEST TRACING ONLY: CPT

## 2021-07-09 PROCEDURE — 93306 TTE W/DOPPLER COMPLETE: CPT

## 2021-07-20 ENCOUNTER — HOSPITAL ENCOUNTER (OUTPATIENT)
Dept: RADIOLOGY | Facility: HOSPITAL | Age: 76
Discharge: HOME | End: 2021-07-20
Payer: COMMERCIAL

## 2021-07-20 DIAGNOSIS — R06.00 DOE (DYSPNEA ON EXERTION): ICD-10-CM

## 2021-07-20 PROCEDURE — 71046 X-RAY EXAM CHEST 2 VIEWS: CPT

## 2021-07-29 LAB
MAX DIASTOLIC BP: 96 MMHG
MAX HEART RATE: 141 BPM
MAX PREDICTED HEART RATE: 147 BPM
MAX. SYSTOLIC BP: 254 MMHG
PROTOCOL NAME: NORMAL
TARGET HR FORMULA: NORMAL
TEST INDICATION: NORMAL
TIME IN EXERCISE PHASE: NORMAL

## 2021-09-17 ENCOUNTER — OFFICE VISIT (OUTPATIENT)
Dept: FAMILY MEDICINE CLINIC | Facility: CLINIC | Age: 76
End: 2021-09-17
Payer: COMMERCIAL

## 2021-09-17 VITALS
HEIGHT: 70 IN | HEART RATE: 86 BPM | OXYGEN SATURATION: 97 % | WEIGHT: 195 LBS | DIASTOLIC BLOOD PRESSURE: 74 MMHG | BODY MASS INDEX: 27.92 KG/M2 | TEMPERATURE: 97.6 F | SYSTOLIC BLOOD PRESSURE: 136 MMHG

## 2021-09-17 DIAGNOSIS — E78.00 HYPERCHOLESTEROLEMIA: ICD-10-CM

## 2021-09-17 DIAGNOSIS — M54.50 ACUTE BILATERAL LOW BACK PAIN WITHOUT SCIATICA: ICD-10-CM

## 2021-09-17 DIAGNOSIS — Z12.5 SCREENING PSA (PROSTATE SPECIFIC ANTIGEN): ICD-10-CM

## 2021-09-17 DIAGNOSIS — I10 ESSENTIAL HYPERTENSION: ICD-10-CM

## 2021-09-17 DIAGNOSIS — M51.37 DEGENERATION OF LUMBOSACRAL INTERVERTEBRAL DISC: ICD-10-CM

## 2021-09-17 DIAGNOSIS — K21.9 GASTRO-ESOPHAGEAL REFLUX DISEASE WITHOUT ESOPHAGITIS: Primary | ICD-10-CM

## 2021-09-17 DIAGNOSIS — M65.342 TRIGGER RING FINGER OF LEFT HAND: ICD-10-CM

## 2021-09-17 PROBLEM — N40.1 ENLARGED PROSTATE WITH LOWER URINARY TRACT SYMPTOMS (LUTS): Status: ACTIVE | Noted: 2021-06-16

## 2021-09-17 PROBLEM — R13.10 DYSPHAGIA: Status: RESOLVED | Noted: 2018-03-16 | Resolved: 2021-09-17

## 2021-09-17 PROCEDURE — 99214 OFFICE O/P EST MOD 30 MIN: CPT | Performed by: FAMILY MEDICINE

## 2021-09-17 RX ORDER — CELECOXIB 200 MG/1
200 CAPSULE ORAL 2 TIMES DAILY
Qty: 30 CAPSULE | Refills: 1 | Status: SHIPPED | OUTPATIENT
Start: 2021-09-17 | End: 2021-09-17 | Stop reason: SDUPTHER

## 2021-09-17 RX ORDER — CELECOXIB 200 MG/1
200 CAPSULE ORAL 2 TIMES DAILY
Qty: 30 CAPSULE | Refills: 1 | Status: SHIPPED | OUTPATIENT
Start: 2021-09-17 | End: 2021-10-21

## 2021-09-17 NOTE — PROGRESS NOTES
Assessment/Plan:         Problem List Items Addressed This Visit        Digestive    Gastro-esophageal reflux disease without esophagitis - Primary     Stable, continue his omeprazole            Cardiovascular and Mediastinum    Essential hypertension     Controlled, continue amlodipine         Relevant Orders    CBC    Comprehensive metabolic panel       Musculoskeletal and Integument    Degeneration of lumbosacral intervertebral disc     Continue Lyrica, stop meloxicam, switch to Celebrex 200 mg twice daily to see if this helps            Other    Hypercholesterolemia     Stable, continue to watch his diet, check CMP, lipid profile in 6 months  Relevant Orders    Lipid panel      Other Visit Diagnoses     Trigger ring finger of left hand        Acute bilateral low back pain without sciatica        Relevant Medications    celecoxib (CeleBREX) 200 mg capsule    Screening PSA (prostate specific antigen)        Relevant Orders    PSA, Total Screen        Recommend ice to his left hand, he is to call if this worsens and will refer to orthopedics  Subjective:      Patient ID: Srikanth Bourne is a 70 y o  male  Patient comes in today for checkup on his GERD, chronic low back pain with radiculopathy, hyperlipidemia  He does complain of worsening back pain since he was doing some work in his y[place] last week  He is also complaining of his left 4th digit locking when he flexes his finger  He feels that the meloxicam is no longer helping with his arthralgias and back pain  He does continue to stay active, watch his diet  He is taking his omeprazole daily        The following portions of the patient's history were reviewed and updated as appropriate:   Past Medical History:  He has a past medical history of GERD (gastroesophageal reflux disease), Hypertension, and Renal calculi ,  _______________________________________________________________________  Medical Problems:  does not have any pertinent problems on file ,  _______________________________________________________________________  Past Surgical History:   has a past surgical history that includes Anterior fusion cervical spine (25 years ago); Cervical fusion; Gallbladder surgery; Prostate surgery; Cholecystectomy; Brain surgery; pr esophagogastroduodenoscopy transoral diagnostic (N/A, 5/1/2019); and pr colonoscopy flx dx w/collj spec when pfrmd (N/A, 5/1/2019)  ,  _______________________________________________________________________  Family History:  family history includes Cancer in his father; Diabetes in his sister; Heart disease in his mother; No Known Problems in his brother, maternal grandfather, maternal grandmother, paternal grandfather, and paternal grandmother; Stroke in his mother ,  _______________________________________________________________________  Social History:   reports that he has never smoked  He has never used smokeless tobacco  He reports that he does not drink alcohol and does not use drugs  ,  _______________________________________________________________________  Allergies:  is allergic to sulfamethoxazole-trimethoprim, gadolinium derivatives, cortisone, iodinated diagnostic agents, and hydrocortisone     _______________________________________________________________________  Current Outpatient Medications   Medication Sig Dispense Refill    amLODIPine (NORVASC) 5 mg tablet Take 1 tablet (5 mg total) by mouth daily 90 tablet 1    aspirin 81 MG tablet Take by mouth      cholecalciferol (VITAMIN D3) 1,000 units tablet Take 1 tablet by mouth daily      coenzyme Q-10 100 MG capsule Take 1 capsule by mouth Daily      diazepam (VALIUM) 5 mg tablet Take 1 tablet (5 mg total) by mouth daily 30 tablet 1    diclofenac sodium (VOLTAREN) 1 % APPLY 4 GRAMS ON THE SKIN 4 TIMES A DAY   DO NOT APPLY TO BROKEN OR INFLAMED SKIN  1    Glucosamine 750 MG TABS Take 1 tablet by mouth daily      Milk Thistle 1000 MG CAPS Take 1 capsule by mouth daily      Multiple Vitamins-Minerals (OSTEO COMPLEX PO) Take by mouth      omeprazole (PRILOSEC) 20 mg delayed release capsule Take 20 mg by mouth daily       pregabalin (LYRICA) 50 mg capsule Take 1 capsule (50 mg total) by mouth 2 (two) times a day (Patient taking differently: Take 50 mg by mouth daily ) 60 capsule 3    PROBIOTIC PRODUCT PO Take by mouth      TURMERIC PO Take 400 mg by mouth      celecoxib (CeleBREX) 200 mg capsule Take 1 capsule (200 mg total) by mouth 2 (two) times a day 30 capsule 1     No current facility-administered medications for this visit      _______________________________________________________________________  Review of Systems   Constitutional: Negative for chills, fatigue and fever  HENT: Negative for congestion, ear pain, hearing loss, postnasal drip, rhinorrhea and sore throat  Eyes: Negative for pain and visual disturbance  Respiratory: Negative for chest tightness, shortness of breath and wheezing  Cardiovascular: Negative for chest pain and leg swelling  Gastrointestinal: Negative for abdominal distention, abdominal pain, constipation, diarrhea and vomiting  Endocrine: Negative for cold intolerance and heat intolerance  Genitourinary: Negative for difficulty urinating, frequency and urgency  Musculoskeletal: Positive for back pain  Negative for arthralgias and gait problem  Left 4th digit locking in flexion   Skin: Negative for color change  Neurological: Negative for dizziness, tremors, syncope, numbness and headaches  Hematological: Negative for adenopathy  Psychiatric/Behavioral: Negative for agitation, confusion and sleep disturbance  The patient is not nervous/anxious            Objective:  Vitals:    09/17/21 1107   BP: 136/74   BP Location: Left arm   Patient Position: Sitting   Cuff Size: Large   Pulse: 86   Temp: 97 6 °F (36 4 °C)   SpO2: 97%   Weight: 88 5 kg (195 lb)   Height: 5' 10" (1 778 m)     Body mass index is 27 98 kg/m²  Physical Exam  Vitals and nursing note reviewed  Constitutional:       Appearance: He is well-developed  HENT:      Head: Normocephalic  Eyes:      General: No scleral icterus  Conjunctiva/sclera: Conjunctivae normal    Neck:      Thyroid: No thyromegaly  Cardiovascular:      Rate and Rhythm: Normal rate and regular rhythm  Heart sounds: Normal heart sounds  No murmur heard  Pulmonary:      Effort: Pulmonary effort is normal  No respiratory distress  Breath sounds: Normal breath sounds  No wheezing  Abdominal:      General: Bowel sounds are normal  There is no distension  Palpations: Abdomen is soft  Tenderness: There is no abdominal tenderness  Musculoskeletal:         General: No tenderness  Normal range of motion  Cervical back: Normal range of motion  Lymphadenopathy:      Cervical: No cervical adenopathy  Skin:     General: Skin is warm and dry  Coloration: Skin is not pale  Findings: No rash  Neurological:      Mental Status: He is alert and oriented to person, place, and time  Cranial Nerves: No cranial nerve deficit     Psychiatric:         Behavior: Behavior normal

## 2021-11-10 ENCOUNTER — OFFICE VISIT (OUTPATIENT)
Dept: CARDIOLOGY CLINIC | Facility: MEDICAL CENTER | Age: 76
End: 2021-11-10
Payer: COMMERCIAL

## 2021-11-10 VITALS
BODY MASS INDEX: 28.06 KG/M2 | HEIGHT: 70 IN | WEIGHT: 196 LBS | SYSTOLIC BLOOD PRESSURE: 130 MMHG | HEART RATE: 92 BPM | OXYGEN SATURATION: 97 % | DIASTOLIC BLOOD PRESSURE: 70 MMHG

## 2021-11-10 DIAGNOSIS — E78.00 HYPERCHOLESTEROLEMIA: Primary | ICD-10-CM

## 2021-11-10 DIAGNOSIS — I10 ESSENTIAL HYPERTENSION: ICD-10-CM

## 2021-11-10 PROCEDURE — 1160F RVW MEDS BY RX/DR IN RCRD: CPT | Performed by: INTERNAL MEDICINE

## 2021-11-10 PROCEDURE — 3008F BODY MASS INDEX DOCD: CPT | Performed by: INTERNAL MEDICINE

## 2021-11-10 PROCEDURE — 99214 OFFICE O/P EST MOD 30 MIN: CPT | Performed by: INTERNAL MEDICINE

## 2021-11-18 DIAGNOSIS — I10 ESSENTIAL HYPERTENSION: ICD-10-CM

## 2021-11-18 RX ORDER — AMLODIPINE BESYLATE 5 MG/1
TABLET ORAL
Qty: 90 TABLET | Refills: 1 | Status: SHIPPED | OUTPATIENT
Start: 2021-11-18 | End: 2022-05-24 | Stop reason: SDUPTHER

## 2022-03-25 ENCOUNTER — APPOINTMENT (OUTPATIENT)
Dept: LAB | Facility: CLINIC | Age: 77
End: 2022-03-25
Payer: COMMERCIAL

## 2022-03-25 DIAGNOSIS — Z12.5 SCREENING PSA (PROSTATE SPECIFIC ANTIGEN): ICD-10-CM

## 2022-03-25 DIAGNOSIS — E78.00 HYPERCHOLESTEROLEMIA: ICD-10-CM

## 2022-03-25 DIAGNOSIS — I10 ESSENTIAL HYPERTENSION: ICD-10-CM

## 2022-03-25 LAB
ALBUMIN SERPL BCP-MCNC: 4.2 G/DL (ref 3.5–5)
ALP SERPL-CCNC: 72 U/L (ref 46–116)
ALT SERPL W P-5'-P-CCNC: 43 U/L (ref 12–78)
ANION GAP SERPL CALCULATED.3IONS-SCNC: 3 MMOL/L (ref 4–13)
AST SERPL W P-5'-P-CCNC: 24 U/L (ref 5–45)
BILIRUB SERPL-MCNC: 0.79 MG/DL (ref 0.2–1)
BUN SERPL-MCNC: 18 MG/DL (ref 5–25)
CALCIUM SERPL-MCNC: 9.2 MG/DL (ref 8.3–10.1)
CHLORIDE SERPL-SCNC: 105 MMOL/L (ref 100–108)
CHOLEST SERPL-MCNC: 187 MG/DL
CO2 SERPL-SCNC: 31 MMOL/L (ref 21–32)
CREAT SERPL-MCNC: 0.87 MG/DL (ref 0.6–1.3)
ERYTHROCYTE [DISTWIDTH] IN BLOOD BY AUTOMATED COUNT: 13.3 % (ref 11.6–15.1)
GFR SERPL CREATININE-BSD FRML MDRD: 85 ML/MIN/1.73SQ M
GLUCOSE P FAST SERPL-MCNC: 105 MG/DL (ref 65–99)
HCT VFR BLD AUTO: 51 % (ref 36.5–49.3)
HDLC SERPL-MCNC: 40 MG/DL
HGB BLD-MCNC: 17.3 G/DL (ref 12–17)
LDLC SERPL CALC-MCNC: 110 MG/DL (ref 0–100)
MCH RBC QN AUTO: 31.9 PG (ref 26.8–34.3)
MCHC RBC AUTO-ENTMCNC: 33.9 G/DL (ref 31.4–37.4)
MCV RBC AUTO: 94 FL (ref 82–98)
NONHDLC SERPL-MCNC: 147 MG/DL
PLATELET # BLD AUTO: 246 THOUSANDS/UL (ref 149–390)
PMV BLD AUTO: 9.6 FL (ref 8.9–12.7)
POTASSIUM SERPL-SCNC: 3.9 MMOL/L (ref 3.5–5.3)
PROT SERPL-MCNC: 8.2 G/DL (ref 6.4–8.2)
PSA SERPL-MCNC: 1 NG/ML (ref 0–4)
RBC # BLD AUTO: 5.42 MILLION/UL (ref 3.88–5.62)
SODIUM SERPL-SCNC: 139 MMOL/L (ref 136–145)
TRIGL SERPL-MCNC: 185 MG/DL
WBC # BLD AUTO: 8.41 THOUSAND/UL (ref 4.31–10.16)

## 2022-03-25 PROCEDURE — 36415 COLL VENOUS BLD VENIPUNCTURE: CPT

## 2022-03-25 PROCEDURE — 80053 COMPREHEN METABOLIC PANEL: CPT

## 2022-03-25 PROCEDURE — 85027 COMPLETE CBC AUTOMATED: CPT

## 2022-03-25 PROCEDURE — 80061 LIPID PANEL: CPT

## 2022-03-25 PROCEDURE — G0103 PSA SCREENING: HCPCS

## 2022-03-30 ENCOUNTER — OFFICE VISIT (OUTPATIENT)
Dept: FAMILY MEDICINE CLINIC | Facility: CLINIC | Age: 77
End: 2022-03-30
Payer: COMMERCIAL

## 2022-03-30 VITALS
SYSTOLIC BLOOD PRESSURE: 108 MMHG | HEIGHT: 70 IN | BODY MASS INDEX: 27.92 KG/M2 | WEIGHT: 195 LBS | OXYGEN SATURATION: 96 % | DIASTOLIC BLOOD PRESSURE: 64 MMHG | HEART RATE: 92 BPM | TEMPERATURE: 97.3 F

## 2022-03-30 DIAGNOSIS — I10 ESSENTIAL HYPERTENSION: ICD-10-CM

## 2022-03-30 DIAGNOSIS — Z00.00 ANNUAL PHYSICAL EXAM: Primary | ICD-10-CM

## 2022-03-30 PROCEDURE — 1160F RVW MEDS BY RX/DR IN RCRD: CPT | Performed by: FAMILY MEDICINE

## 2022-03-30 PROCEDURE — 99397 PER PM REEVAL EST PAT 65+ YR: CPT | Performed by: FAMILY MEDICINE

## 2022-03-30 PROCEDURE — 3288F FALL RISK ASSESSMENT DOCD: CPT | Performed by: FAMILY MEDICINE

## 2022-03-30 PROCEDURE — 1101F PT FALLS ASSESS-DOCD LE1/YR: CPT | Performed by: FAMILY MEDICINE

## 2022-03-30 PROCEDURE — 3008F BODY MASS INDEX DOCD: CPT | Performed by: FAMILY MEDICINE

## 2022-03-30 PROCEDURE — 3725F SCREEN DEPRESSION PERFORMED: CPT | Performed by: FAMILY MEDICINE

## 2022-03-30 NOTE — PATIENT INSTRUCTIONS

## 2022-03-30 NOTE — PROGRESS NOTES
[place] [id number] [address]    NAME: Erin Owens  AGE: 78 y o  SEX: male  : 1945     DATE: 3/30/2022     Assessment and Plan:     Problem List Items Addressed This Visit        Cardiovascular and Mediastinum    Essential hypertension      Other Visit Diagnoses     Annual physical exam    -  Primary    BMI 27 0-27 9,adult              Immunizations and preventive care screenings were discussed with patient today  Appropriate education was printed on patient's after visit summary  Counseling:  Dental Health: discussed importance of regular tooth brushing, flossing, and dental visits  · Exercise: the importance of regular exercise/physical activity was discussed  Recommend exercise 3-5 times per week for at least 30 minutes  Return in 6 months (on 2022)  Chief Complaint:     Chief Complaint   Patient presents with    Physical Exam      History of Present Illness:     Adult Annual Physical   Patient here for a comprehensive physical exam  The patient reports problems - arthritis in the hands  Diet and Physical Activity  · Diet/Nutrition: well balanced diet  · Exercise: moderate cardiovascular exercise  Depression Screening  PHQ-2/9 Depression Screening    Little interest or pleasure in doing things: 0 - not at all  Feeling down, depressed, or hopeless: 0 - not at all  PHQ-2 Score: 0  PHQ-2 Interpretation: Negative depression screen       General Health  · Sleep: sleeps well  · Hearing: normal - bilateral   · Vision: no vision problems  · Dental: regular dental visits   Health  · Symptoms include: none     Review of Systems:     Review of Systems   Constitutional: Negative for chills, fatigue and fever  HENT: Negative for congestion, ear pain, hearing loss, postnasal drip, rhinorrhea and sore throat  Eyes: Negative for pain and visual disturbance     Respiratory: Negative for chest tightness, shortness of breath and wheezing  Cardiovascular: Negative for chest pain and leg swelling  Gastrointestinal: Negative for abdominal distention, abdominal pain, constipation, diarrhea and vomiting  Endocrine: Negative for cold intolerance and heat intolerance  Genitourinary: Negative for difficulty urinating, frequency and urgency  Musculoskeletal: Positive for arthralgias  Negative for gait problem  Skin: Negative for color change  Neurological: Negative for dizziness, tremors, syncope, numbness and headaches  Hematological: Negative for adenopathy  Psychiatric/Behavioral: Negative for agitation, confusion and sleep disturbance  The patient is not nervous/anxious  Past Medical History:     Past Medical History:   Diagnosis Date    GERD (gastroesophageal reflux disease)     Hypertension     Renal calculi       Past Surgical History:     Past Surgical History:   Procedure Laterality Date    ANTERIOR FUSION CERVICAL SPINE  25 years ago    C5, C6, C7    BRAIN SURGERY      CERVICAL FUSION      vertebral     CHOLECYSTECTOMY      GALLBLADDER SURGERY      [place] COLONOSCOPY FLX DX W/COLLJ SPEC WHEN PFRMD N/A 5/1/2019    Procedure: COLONOSCOPY;  Surgeon: Vandana Echeverria MD;  Location: [facility]; Service: Gastroenterology    [place] ESOPHAGOGASTRODUODENOSCOPY TRANSORAL DIAGNOSTIC N/A 5/1/2019    Procedure: ESOPHAGOGASTRODUODENOSCOPY (EGD); Surgeon: Vandana Echeverria MD;  Location[facility];   Service: Gastroenterology    PROSTATE SURGERY        Family History:     Family History   Problem Relation Age of Onset    Heart disease Mother     Stroke Mother     Cancer Father     Diabetes Sister         mellitus     No Known Problems Brother     No Known Problems Maternal Grandmother     No Known Problems Maternal Grandfather     No Known Problems Paternal Grandmother     No Known Problems Paternal Grandfather       Social History:     Social History Socioeconomic History    Marital status: /Civil Union     Spouse name: None    Number of children: 3    Years of education: post graduate     Highest education level: None   Occupational History    Occupation: Retired    Tobacco Use    Smoking status: Never Smoker    Smokeless tobacco: Never Used   Substance and Sexual Activity    Alcohol use: No    Drug use: No    Sexual activity: None   Other Topics Concern    None   Social History Narrative    Always uses seat belt     Exercises regularly     Lives with spouse      Social Determinants of Health     Financial Resource Strain: Not on file   Food Insecurity: Not on file   Transportation Needs: Not on file   Physical Activity: Not on file   Stress: Not on file   Social Connections: Not on file   Intimate Partner Violence: Not on file   Housing Stability: Not on file      Current Medications:     Current Outpatient Medications   Medication Sig Dispense Refill    amLODIPine (NORVASC) 5 mg tablet TAKE 1 TABLET BY MOUTH EVERY DAY 90 tablet 1    cholecalciferol (VITAMIN D3) 1,000 units tablet Take 1 tablet by mouth daily      coenzyme Q-10 100 MG capsule Take 1 capsule by mouth Daily      diazepam (VALIUM) 5 mg tablet Take 1 tablet (5 mg total) by mouth daily 30 tablet 1    diclofenac sodium (VOLTAREN) 1 % APPLY 4 GRAMS ON THE SKIN 4 TIMES A DAY  DO NOT APPLY TO BROKEN OR INFLAMED SKIN  1    Glucosamine 750 MG TABS Take 1 tablet by mouth daily      Milk Thistle 1000 MG CAPS Take 1 capsule by mouth daily      Multiple Vitamins-Minerals (OSTEO COMPLEX PO) Take by mouth      omeprazole (PRILOSEC) 20 mg delayed release capsule Take 20 mg by mouth daily       pregabalin (LYRICA) 50 mg capsule TAKE 1 CAPSULE BY MOUTH 2 TIMES A DAY  60 capsule 5    PROBIOTIC PRODUCT PO Take by mouth      TURMERIC PO Take 400 mg by mouth       No current facility-administered medications for this visit  Allergies:      Allergies   Allergen Reactions    Sulfamethoxazole-Trimethoprim Rash    Gadolinium Derivatives Hives and Itching     Reaction Date: 22Aug2017; Action Taken: Radiologist examined pt, he was told to return to ER with any worsening of hives  Will need MRI prep going forward ;     Cortisone      hives    Iodinated Diagnostic Agents     Hydrocortisone Rash     Category: Allergy; Physical Exam:     /64 (BP Location: Left arm, Patient Position: Sitting, Cuff Size: Adult)   Pulse 92   Temp (!) 97 3 °F (36 3 °C)   Ht 5' 10" (1 778 m)   Wt 88 5 kg (195 lb)   SpO2 96%   BMI 27 98 kg/m²     Physical Exam  Constitutional:       Appearance: He is well-developed  HENT:      Head: Normocephalic  Right Ear: External ear normal       Left Ear: External ear normal       Nose: Nose normal    Eyes:      Conjunctiva/sclera: Conjunctivae normal       Pupils: Pupils are equal, round, and reactive to light  Neck:      Thyroid: No thyromegaly  Cardiovascular:      Rate and Rhythm: Normal rate and regular rhythm  Heart sounds: Normal heart sounds  Pulmonary:      Effort: Pulmonary effort is normal       Breath sounds: Normal breath sounds  Abdominal:      General: Bowel sounds are normal       Palpations: Abdomen is soft  Musculoskeletal:         General: Normal range of motion  Cervical back: Normal range of motion  Skin:     General: Skin is warm and dry  Neurological:      Mental Status: He is alert and oriented to person, place, and time     Psychiatric:         Behavior: Behavior normal           DO Angel Wallace [phone]

## 2022-05-03 ENCOUNTER — TELEPHONE (OUTPATIENT)
Dept: FAMILY MEDICINE CLINIC | Facility: CLINIC | Age: 77
End: 2022-05-03

## 2022-05-03 NOTE — TELEPHONE ENCOUNTER
T/c from pt - pt was taking Celebrex 200mg and it was discontinued - pt is requesting taking it again/ reports aches and pains since being in [place]

## 2022-05-04 DIAGNOSIS — M51.37 DEGENERATION OF LUMBOSACRAL INTERVERTEBRAL DISC: Primary | ICD-10-CM

## 2022-05-04 RX ORDER — CELECOXIB 200 MG/1
200 CAPSULE ORAL 2 TIMES DAILY
Qty: 60 CAPSULE | Refills: 1 | Status: SHIPPED | OUTPATIENT
Start: 2022-05-04

## 2022-05-24 DIAGNOSIS — I10 ESSENTIAL HYPERTENSION: ICD-10-CM

## 2022-05-24 RX ORDER — AMLODIPINE BESYLATE 5 MG/1
5 TABLET ORAL DAILY
Qty: 90 TABLET | Refills: 1 | Status: SHIPPED | OUTPATIENT
Start: 2022-05-24

## 2022-06-29 NOTE — ADDENDUM NOTE
Addended byTeresita Rajan on: 3/13/2018 12:23 PM     Modules accepted: Orders Quality 130: Documentation Of Current Medications In The Medical Record: Current Medications Documented Quality 110: Preventive Care And Screening: Influenza Immunization: Influenza Immunization not Administered for Documented Reasons. Detail Level: Detailed Additional Notes: Patient refused

## 2022-08-03 DIAGNOSIS — M51.37 DEGENERATION OF LUMBOSACRAL INTERVERTEBRAL DISC: ICD-10-CM

## 2022-08-04 RX ORDER — PREGABALIN 50 MG/1
50 CAPSULE ORAL 2 TIMES DAILY
Qty: 60 CAPSULE | Refills: 5 | Status: SHIPPED | OUTPATIENT
Start: 2022-08-04

## 2022-08-17 DIAGNOSIS — M51.37 DEGENERATION OF LUMBOSACRAL INTERVERTEBRAL DISC: ICD-10-CM

## 2022-08-17 RX ORDER — CELECOXIB 200 MG/1
CAPSULE ORAL
Qty: 60 CAPSULE | Refills: 1 | Status: SHIPPED | OUTPATIENT
Start: 2022-08-17 | End: 2022-09-26 | Stop reason: SDUPTHER

## 2022-09-21 DIAGNOSIS — I10 ESSENTIAL HYPERTENSION: ICD-10-CM

## 2022-09-21 RX ORDER — AMLODIPINE BESYLATE 5 MG/1
5 TABLET ORAL DAILY
Qty: 90 TABLET | Refills: 1 | Status: SHIPPED | OUTPATIENT
Start: 2022-09-21

## 2022-09-26 ENCOUNTER — TELEPHONE (OUTPATIENT)
Dept: FAMILY MEDICINE CLINIC | Facility: CLINIC | Age: 77
End: 2022-09-26

## 2022-09-26 DIAGNOSIS — M51.37 DEGENERATION OF LUMBOSACRAL INTERVERTEBRAL DISC: ICD-10-CM

## 2022-09-26 RX ORDER — CELECOXIB 200 MG/1
200 CAPSULE ORAL 2 TIMES DAILY
Qty: 60 CAPSULE | Refills: 1 | Status: SHIPPED | OUTPATIENT
Start: 2022-09-26 | End: 2022-09-29

## 2022-09-27 NOTE — TELEPHONE ENCOUNTER
T/c from pt's wife -- states she called the insurance company and the Celebrex needs Prior Auth completed before being able to  the medication       Proceed with PA?

## 2022-09-28 DIAGNOSIS — M51.37 DEGENERATION OF LUMBOSACRAL INTERVERTEBRAL DISC: ICD-10-CM

## 2022-09-28 NOTE — TELEPHONE ENCOUNTER
T/c from pts wife about PA mentioned -- reports pharmacy started the process through Cover My Meds  Wants PA done as soon as possible, as pt only has one pill left

## 2022-09-29 RX ORDER — CELECOXIB 200 MG/1
200 CAPSULE ORAL DAILY
Qty: 30 CAPSULE | Refills: 1 | Status: SHIPPED | OUTPATIENT
Start: 2022-09-29 | End: 2022-09-29

## 2022-09-29 RX ORDER — CELECOXIB 200 MG/1
200 CAPSULE ORAL DAILY
Qty: 30 CAPSULE | Refills: 1 | Status: SHIPPED | OUTPATIENT
Start: 2022-09-29 | End: 2022-10-03 | Stop reason: SDUPTHER

## 2022-09-29 NOTE — TELEPHONE ENCOUNTER
Lenora Delarosa [place]: [id number] - PA Case ID: [id number]  Need help?  Call us at [phone]    Status   Sent to [facility]

## 2022-09-30 NOTE — TELEPHONE ENCOUNTER
[facility] [place] ([place]: [id number]) - [phone]  CeleBREX 200MG capsules       Status: PA Response - Approved  Created: September 29th, 2022  Sent: September 29th, 2022    Pt notified

## 2022-10-03 ENCOUNTER — OFFICE VISIT (OUTPATIENT)
Dept: FAMILY MEDICINE CLINIC | Facility: CLINIC | Age: 77
End: 2022-10-03
Payer: COMMERCIAL

## 2022-10-03 VITALS
OXYGEN SATURATION: 97 % | BODY MASS INDEX: 28.23 KG/M2 | WEIGHT: 197.2 LBS | TEMPERATURE: 97.5 F | SYSTOLIC BLOOD PRESSURE: 120 MMHG | HEIGHT: 70 IN | DIASTOLIC BLOOD PRESSURE: 72 MMHG | HEART RATE: 95 BPM

## 2022-10-03 DIAGNOSIS — L30.9 DERMATITIS: ICD-10-CM

## 2022-10-03 DIAGNOSIS — H01.005 BLEPHARITIS OF LEFT LOWER EYELID, UNSPECIFIED TYPE: ICD-10-CM

## 2022-10-03 DIAGNOSIS — E78.00 HYPERCHOLESTEROLEMIA: ICD-10-CM

## 2022-10-03 DIAGNOSIS — J00 ACUTE RHINITIS: ICD-10-CM

## 2022-10-03 DIAGNOSIS — Z12.5 SCREENING PSA (PROSTATE SPECIFIC ANTIGEN): ICD-10-CM

## 2022-10-03 DIAGNOSIS — R13.19 ESOPHAGEAL DYSPHAGIA: ICD-10-CM

## 2022-10-03 DIAGNOSIS — I10 ESSENTIAL HYPERTENSION: Primary | ICD-10-CM

## 2022-10-03 DIAGNOSIS — K21.9 GASTRO-ESOPHAGEAL REFLUX DISEASE WITHOUT ESOPHAGITIS: ICD-10-CM

## 2022-10-03 DIAGNOSIS — M51.37 DEGENERATION OF LUMBOSACRAL INTERVERTEBRAL DISC: ICD-10-CM

## 2022-10-03 PROCEDURE — 99214 OFFICE O/P EST MOD 30 MIN: CPT | Performed by: FAMILY MEDICINE

## 2022-10-03 RX ORDER — CELECOXIB 200 MG/1
200 CAPSULE ORAL 2 TIMES DAILY
Qty: 60 CAPSULE | Refills: 3 | Status: SHIPPED | OUTPATIENT
Start: 2022-10-03

## 2022-10-03 RX ORDER — METHYLPREDNISOLONE 4 MG/1
TABLET ORAL
Qty: 21 TABLET | Refills: 0 | Status: SHIPPED | OUTPATIENT
Start: 2022-10-03 | End: 2022-10-09

## 2022-10-03 NOTE — PROGRESS NOTES
Name: Priya Harmon      :       MRN: [id number]  Encounter Provider: Michelle Means DO  Encounter Date: 10/3/2022   Encounter department: [facility] [address] [place] [address]     1  Essential hypertension  Assessment & Plan:   Controlled, continue the amlodipine      2  Hypercholesterolemia  Assessment & Plan:   Check CBC CMP, lipid profile 6 months    Orders:  -     CBC; Future  -     Comprehensive metabolic panel; Future  -     Lipid panel; Future    3  Gastro-esophageal reflux disease without esophagitis  Assessment & Plan:    Continue omeprazole      4  Esophageal dysphagia  -     Ambulatory referral to Gastroenterology; Future    5  Degeneration of lumbosacral intervertebral disc  Assessment & Plan:   Restart the Celebrex 200 mg twice daily    Orders:  -     celecoxib (CeleBREX) 200 mg capsule; Take 1 capsule (200 mg total) by mouth 2 (two) times a day    6  Acute rhinitis  -     methylPREDNISolone 4 MG tablet therapy pack; Use as directed on package    7  Screening PSA (prostate specific antigen)  -     PSA, Total Screen; Future    8  Blepharitis of left lower eyelid, unspecified type  Comments:   warm moist compresses    9  Dermatitis  Comments:   over-the-counter cortisone cream to his right ear as needed           Subjective       Patient comes in today for checkup, he does complain of some sinus pressure and pain specifically in his forehead  He is also complaining of some skin irritation in his right ear  He is also bothered by a scratchy feeling in his left eye  Review of Systems   Constitutional: Negative for chills, fatigue and fever  HENT: Negative for congestion, ear pain, hearing loss, postnasal drip, rhinorrhea and sore throat  Eyes: Positive for itching  Negative for pain and visual disturbance  Respiratory: Negative for chest tightness, shortness of breath and wheezing      Cardiovascular: Negative for chest pain and leg swelling  Gastrointestinal: Negative for abdominal distention, abdominal pain, constipation, diarrhea and vomiting  Endocrine: Negative for cold intolerance and heat intolerance  Genitourinary: Negative for difficulty urinating, frequency and urgency  Musculoskeletal: Positive for back pain  Negative for arthralgias and gait problem  Skin: Positive for rash  Negative for color change  Neurological: Negative for dizziness, tremors, syncope, numbness and headaches  Hematological: Negative for adenopathy  Psychiatric/Behavioral: Negative for agitation, confusion and sleep disturbance  The patient is not nervous/anxious  Current Outpatient Medications on File Prior to Visit   Medication Sig    amLODIPine (NORVASC) 5 mg tablet TAKE 1 TABLET (5 MG TOTAL) BY MOUTH IN THE MORNING   cholecalciferol (VITAMIN D3) 1,000 units tablet Take 1 tablet by mouth daily    coenzyme Q-10 100 MG capsule Take 1 capsule by mouth Daily    diazepam (VALIUM) 5 mg tablet Take 1 tablet (5 mg total) by mouth daily    diclofenac sodium (VOLTAREN) 1 % APPLY 4 GRAMS ON THE SKIN 4 TIMES A DAY  DO NOT APPLY TO BROKEN OR INFLAMED SKIN    Glucosamine 750 MG TABS Take 1 tablet by mouth daily    Milk Thistle 1000 MG CAPS Take 1 capsule by mouth daily    Multiple Vitamins-Minerals (OSTEO COMPLEX PO) Take by mouth    omeprazole (PriLOSEC) 20 mg delayed release capsule Take 20 mg by mouth daily     pregabalin (LYRICA) 50 mg capsule Take 1 capsule (50 mg total) by mouth 2 (two) times a day    PROBIOTIC PRODUCT PO Take by mouth    TURMERIC PO Take 400 mg by mouth    [DISCONTINUED] celecoxib (CeleBREX) 200 mg capsule Take 1 capsule (200 mg total) by mouth daily       Objective     /72   Pulse 95   Temp 97 5 °F (36 4 °C)   Ht 5' 10" (1 778 m)   Wt 89 4 kg (197 lb 3 2 oz)   SpO2 97%   BMI 28 30 kg/m²     Physical Exam  Vitals and nursing note reviewed  Constitutional:       Appearance: He is well-developed  HENT:      Head: Normocephalic  Eyes:      General: No scleral icterus  Conjunctiva/sclera: Conjunctivae normal         Comments:   Dry scaling skin, conjunctival injection   Neck:      Thyroid: No thyromegaly  Cardiovascular:      Rate and Rhythm: Normal rate and regular rhythm  Heart sounds: Normal heart sounds  No murmur heard  Pulmonary:      Effort: Pulmonary effort is normal  No respiratory distress  Breath sounds: Normal breath sounds  No wheezing  Abdominal:      General: Bowel sounds are normal  There is no distension  Palpations: Abdomen is soft  Tenderness: There is no abdominal tenderness  Musculoskeletal:         General: No tenderness  Normal range of motion  Cervical back: Normal range of motion  Lymphadenopathy:      Cervical: No cervical adenopathy  Skin:     General: Skin is warm and dry  Coloration: Skin is not pale  Findings: No rash  Comments: Scaling skin left external ear   Neurological:      Mental Status: He is alert and oriented to person, place, and time  Cranial Nerves: No cranial nerve deficit     Psychiatric:         Behavior: Behavior normal        Shannon Holguin DO

## 2022-11-15 ENCOUNTER — OFFICE VISIT (OUTPATIENT)
Dept: GASTROENTEROLOGY | Facility: CLINIC | Age: 77
End: 2022-11-15

## 2022-11-15 VITALS
WEIGHT: 201 LBS | BODY MASS INDEX: 28.77 KG/M2 | HEIGHT: 70 IN | OXYGEN SATURATION: 98 % | SYSTOLIC BLOOD PRESSURE: 132 MMHG | DIASTOLIC BLOOD PRESSURE: 60 MMHG | HEART RATE: 95 BPM

## 2022-11-15 DIAGNOSIS — R13.19 ESOPHAGEAL DYSPHAGIA: ICD-10-CM

## 2022-11-15 NOTE — PATIENT INSTRUCTIONS
Scheduled date of EGD(as of today): 11/28/22  Physician performing EGD: Chioma Bloom  Location of EGD: [facility]  Instructions reviewed with patient by: Lord Shantelle JOHN  Clearances:

## 2022-11-15 NOTE — PROGRESS NOTES
[facility]      Chief Complaint:  Trouble swallowing    HPI:  Helen Paz is a 78 y o   male who presents with trouble swallowing certain solids  Last night he felt chicken get stuck after he had swallowed  He manages to wash it down  Today he felt crackers gets stuck  He actually regurgitated on several occasions in the past   He has no weight loss or melena  He had an EGD April 2019 which was essentially unremarkable  He does have a long history of GERD  He has no other associated symptomatology  No chest pain or shortness of breath  No choking         Review of Systems:   Constitutional: No fever or chills, feels well, no tiredness, no recent weight gain or weight loss  HENT: No complaints of earache, no hearing loss, no nosebleeds, no nasal discharge, no sore throat, no hoarseness  Eyes: No complaints of eye pain, no red eyes, no discharge from eyes, no itchy eyes  Cardiovascular: No complaints of slow heart rate, no fast heart rate, no chest pain, no palpitations, no leg claudication, no lower extremity edema  Respiratory: No complaints of shortness of breath, no wheezing, no cough, no SOB on exertion, no orthopnea  Gastrointestinal: As noted in HPI  Genitourinary: No complaints of dysuria, no incontinence, no hesitancy, no nocturia  Musculoskeletal:  Pain left wrist   Neurological: No complaints of headache, no confusion, no convulsions, no numbness or tingling, no dizziness or fainting, no limb weakness, no difficulty walking  Skin: No complaints of skin rash or skin lesions, no itching, no skin wound, no dry skin  Hematological/Lymphatic: No complaints of swollen glands, does not bleed easy  Allergic/Immunologic: No immunocompromised state  Endocrine:  No complaints of polyuria, no polydipsia  Psychiatric/Behavioral: is not suicidal, no sleep disturbances, no anxiety or depression, no change in personality, no emotional problems  Historical Information   Past Medical History:   Diagnosis Date   • GERD (gastroesophageal reflux disease)    • Hypertension    • Renal calculi      Past Surgical History:   Procedure Laterality Date   • ANTERIOR FUSION CERVICAL SPINE  25 years ago    C5, C6, C7   • BRAIN SURGERY     • CERVICAL FUSION      vertebral    • CHOLECYSTECTOMY     • GALLBLADDER SURGERY     • NE COLONOSCOPY FLX DX W/COLLJ SPEC WHEN PFRMD N/A 5/1/2019    Procedure: COLONOSCOPY;  Surgeon: Darien Ramos MD;  Location: [facility]; Service: Gastroenterology   • NE ESOPHAGOGASTRODUODENOSCOPY TRANSORAL DIAGNOSTIC N/A 5/1/2019    Procedure: ESOPHAGOGASTRODUODENOSCOPY (EGD); Surgeon: Darien Ramos MD;  Location: [facility]; Service: Gastroenterology   • PROSTATE SURGERY       Social History   Social History     Substance and Sexual Activity   Alcohol Use No     Social History     Substance and Sexual Activity   Drug Use No     Social History     Tobacco Use   Smoking Status Never Smoker   Smokeless Tobacco Never Used     Family History   Problem Relation Age of Onset   • Heart disease Mother    • Stroke Mother    • Cancer Father    • Diabetes Sister         mellitus    • Arthritis Sister    • Cancer Sister    • No Known Problems Brother    • No Known Problems Maternal Grandmother    • No Known Problems Maternal Grandfather    • No Known Problems Paternal Grandmother    • No Known Problems Paternal Grandfather    • Arthritis Sister          Current Medications: has a current medication list which includes the following prescription(s): amlodipine, celecoxib, cholecalciferol, coenzyme q-10, diazepam, diclofenac sodium, glucosamine, milk thistle, multiple vitamins-minerals, omeprazole, pregabalin, probiotic product, and turmeric          Vital Signs: /60   Pulse 95   Ht 5' 10" (1 778 m)   Wt 91 2 kg (201 lb)   SpO2 98%   BMI 28 84 kg/m²       Physical Exam:   Constitutional  General Appearance: No acute distress, well appearing and well nourished  Head  Normocephalic  Eyes  Conjunctivae and lids: No swelling, erythema, or discharge  Pupils and irises: Equal, round and reactive to light  Ears, Nose, Mouth, and Throat  External inspection of ears and nose: Normal  Nasal mucosa, septum and turbinates: Normal without edema or erythema/   Oropharynx: Normal with no erythema, edema, exudate or lesions  Neck  Normal range of motion  Neck supple  Cardiovascular  Auscultation of the heart: Normal rate and rhythm, normal S1 and S2 without murmurs  Examination of the extremities for edema and/or varicosities: Normal  Pulmonary/Chest  Respiratory effort: No increased work of breathing or signs of respiratory distress  Auscultation of lungs: Clear to auscultation, equal breath sounds bilaterally, no wheezes, rales, no rhonchi  Abdomen  Abdomen: Non-tender, no masses  Liver and spleen: No hepatomegaly or splenomegaly  Musculoskeletal  Gait and station: normal   Digits and Nails: normal without clubbing or cyanosis  Inspection/palpation of joints, bones, and muscles: Normal  Neurological  No nystagmus or asterixis  Skin  Skin and subcutaneous tissue: Normal without rashes or lesions  Lymphatic  Palpation of the lymph nodes in neck: No lymphadenopathy     Psychiatric  Orientation to person, place and time: Normal   Mood and affect: Normal          Labs:  Lab Results   Component Value Date    ALT 43 03/25/2022    AST 24 03/25/2022    BUN 18 03/25/2022    CALCIUM 9 2 03/25/2022     03/25/2022    CO2 31 03/25/2022    CREATININE 0 87 03/25/2022    HDL 40 03/25/2022    HCT 51 0 (H) 03/25/2022    HGB 17 3 (H) 03/25/2022    HGBA1C 6 3 05/03/2018     03/25/2022    K 3 9 03/25/2022    PSA 1 0 03/25/2022    TRIG 185 (H) 03/25/2022    WBC 8 41 03/25/2022         X-Rays & Procedures:   No orders to display           ______________________________________________________________________      Assessment & Plan:     Diagnoses and all orders for this visit:    Esophageal dysphagia  -     Ambulatory referral to Gastroenterology  -     EGD; Future      Patient will be scheduled for an EGD    Further recommendations will depend on study results

## 2022-11-15 NOTE — H&P (VIEW-ONLY)
[facility]      Chief Complaint:  Trouble swallowing    HPI:  Shea Molina is a 78 y o   male who presents with trouble swallowing certain solids  Last night he felt chicken get stuck after he had swallowed  He manages to wash it down  Today he felt crackers gets stuck  He actually regurgitated on several occasions in the past   He has no weight loss or melena  He had an EGD April 2019 which was essentially unremarkable  He does have a long history of GERD  He has no other associated symptomatology  No chest pain or shortness of breath  No choking         Review of Systems:   Constitutional: No fever or chills, feels well, no tiredness, no recent weight gain or weight loss  HENT: No complaints of earache, no hearing loss, no nosebleeds, no nasal discharge, no sore throat, no hoarseness  Eyes: No complaints of eye pain, no red eyes, no discharge from eyes, no itchy eyes  Cardiovascular: No complaints of slow heart rate, no fast heart rate, no chest pain, no palpitations, no leg claudication, no lower extremity edema  Respiratory: No complaints of shortness of breath, no wheezing, no cough, no SOB on exertion, no orthopnea  Gastrointestinal: As noted in HPI  Genitourinary: No complaints of dysuria, no incontinence, no hesitancy, no nocturia  Musculoskeletal:  Pain left wrist   Neurological: No complaints of headache, no confusion, no convulsions, no numbness or tingling, no dizziness or fainting, no limb weakness, no difficulty walking  Skin: No complaints of skin rash or skin lesions, no itching, no skin wound, no dry skin  Hematological/Lymphatic: No complaints of swollen glands, does not bleed easy  Allergic/Immunologic: No immunocompromised state  Endocrine:  No complaints of polyuria, no polydipsia  Psychiatric/Behavioral: is not suicidal, no sleep disturbances, no anxiety or depression, no change in personality, no emotional problems  Historical Information   Past Medical History:   Diagnosis Date   • GERD (gastroesophageal reflux disease)    • Hypertension    • Renal calculi      Past Surgical History:   Procedure Laterality Date   • ANTERIOR FUSION CERVICAL SPINE  25 years ago    C5, C6, C7   • BRAIN SURGERY     • CERVICAL FUSION      vertebral    • CHOLECYSTECTOMY     • GALLBLADDER SURGERY     • ID COLONOSCOPY FLX DX W/COLLJ SPEC WHEN PFRMD N/A 5/1/2019    Procedure: COLONOSCOPY;  Surgeon: Bryson Wong MD;  Location[facility]; Service: Gastroenterology   • ID ESOPHAGOGASTRODUODENOSCOPY TRANSORAL DIAGNOSTIC N/A 5/1/2019    Procedure: ESOPHAGOGASTRODUODENOSCOPY (EGD); Surgeon: Bryson Wong MD;  Location[facility]; Service: Gastroenterology   • PROSTATE SURGERY       Social History   Social History     Substance and Sexual Activity   Alcohol Use No     Social History     Substance and Sexual Activity   Drug Use No     Social History     Tobacco Use   Smoking Status Never Smoker   Smokeless Tobacco Never Used     Family History   Problem Relation Age of Onset   • Heart disease Mother    • Stroke Mother    • Cancer Father    • Diabetes Sister         mellitus    • Arthritis Sister    • Cancer Sister    • No Known Problems Brother    • No Known Problems Maternal Grandmother    • No Known Problems Maternal Grandfather    • No Known Problems Paternal Grandmother    • No Known Problems Paternal Grandfather    • Arthritis Sister          Current Medications: has a current medication list which includes the following prescription(s): amlodipine, celecoxib, cholecalciferol, coenzyme q-10, diazepam, diclofenac sodium, glucosamine, milk thistle, multiple vitamins-minerals, omeprazole, pregabalin, probiotic product, and turmeric          Vital Signs: /60   Pulse 95   Ht 5' 10" (1 778 m)   Wt 91 2 kg (201 lb)   SpO2 98%   BMI 28 84 kg/m²       Physical Exam:   Constitutional  General Appearance: No acute distress, well appearing and well nourished  Head  Normocephalic  Eyes  Conjunctivae and lids: No swelling, erythema, or discharge  Pupils and irises: Equal, round and reactive to light  Ears, Nose, Mouth, and Throat  External inspection of ears and nose: Normal  Nasal mucosa, septum and turbinates: Normal without edema or erythema/   Oropharynx: Normal with no erythema, edema, exudate or lesions  Neck  Normal range of motion  Neck supple  Cardiovascular  Auscultation of the heart: Normal rate and rhythm, normal S1 and S2 without murmurs  Examination of the extremities for edema and/or varicosities: Normal  Pulmonary/Chest  Respiratory effort: No increased work of breathing or signs of respiratory distress  Auscultation of lungs: Clear to auscultation, equal breath sounds bilaterally, no wheezes, rales, no rhonchi  Abdomen  Abdomen: Non-tender, no masses  Liver and spleen: No hepatomegaly or splenomegaly  Musculoskeletal  Gait and station: normal   Digits and Nails: normal without clubbing or cyanosis  Inspection/palpation of joints, bones, and muscles: Normal  Neurological  No nystagmus or asterixis  Skin  Skin and subcutaneous tissue: Normal without rashes or lesions  Lymphatic  Palpation of the lymph nodes in neck: No lymphadenopathy     Psychiatric  Orientation to person, place and time: Normal   Mood and affect: Normal          Labs:  Lab Results   Component Value Date    ALT 43 03/25/2022    AST 24 03/25/2022    BUN 18 03/25/2022    CALCIUM 9 2 03/25/2022     03/25/2022    CO2 31 03/25/2022    CREATININE 0 87 03/25/2022    HDL 40 03/25/2022    HCT 51 0 (H) 03/25/2022    HGB 17 3 (H) 03/25/2022    HGBA1C 6 3 05/03/2018     03/25/2022    K 3 9 03/25/2022    PSA 1 0 03/25/2022    TRIG 185 (H) 03/25/2022    WBC 8 41 03/25/2022         X-Rays & Procedures:   No orders to display           ______________________________________________________________________      Assessment & Plan:     Diagnoses and all orders for this visit:    Esophageal dysphagia  -     Ambulatory referral to Gastroenterology  -     EGD; Future      Patient will be scheduled for an EGD    Further recommendations will depend on study results

## 2022-11-22 ENCOUNTER — NURSE TRIAGE (OUTPATIENT)
Dept: OTHER | Facility: OTHER | Age: 77
End: 2022-11-22

## 2022-11-22 NOTE — TELEPHONE ENCOUNTER
Reason for Disposition  • [1] Caller has NON-URGENT question AND [2] triager unable to answer question    Answer Assessment - Initial Assessment Questions  1  DATE/TIME: "When did you have your endoscopy?"      Scheduled for Monday, November 28 2   MAIN CONCERN: "What is your main concern right now?" "What questions do you have?"      "I would like to know if my  needs to stop his celebrex prior to the procedure and I have other questions regarding his medications?"    Protocols used: ENDOSCOPY (UPPER GI) SYMPTOMS AND QUESTIONS-ADULT-

## 2022-11-23 ENCOUNTER — NURSE TRIAGE (OUTPATIENT)
Dept: OTHER | Facility: OTHER | Age: 77
End: 2022-11-23

## 2022-11-23 NOTE — TELEPHONE ENCOUNTER
Patient's wife would like a call back to advise  It looks like she missed a katey from the office yesterday  Reason for Disposition  • Nursing judgment    Answer Assessment - Initial Assessment Questions  1  REASON FOR CALL or QUESTION: "What is your reason for calling today?" or "How can I best help you?" or "What question do you have that I can help answer?"      Should patient stop Celebrex prior to EGD?     Protocols used: INFORMATION ONLY CALL - NO TRIAGE-ADULT-[place]

## 2022-11-23 NOTE — TELEPHONE ENCOUNTER
Regarding: EGD procedure and medication questions  ----- Message from Freddy Lang sent at 11/23/2022 11:54 AM EST -----  "My  is having an EGD on Monday 11/28/22 and was given a list of medication he needs to stop but I have some questions about his Celebrex and if he should stop it as well "

## 2022-11-28 ENCOUNTER — ANESTHESIA EVENT (OUTPATIENT)
Dept: GASTROENTEROLOGY | Facility: HOSPITAL | Age: 77
End: 2022-11-28

## 2022-11-28 ENCOUNTER — HOSPITAL ENCOUNTER (OUTPATIENT)
Dept: GASTROENTEROLOGY | Facility: HOSPITAL | Age: 77
Setting detail: OUTPATIENT SURGERY
Discharge: HOME | End: 2022-11-28
Attending: INTERNAL MEDICINE

## 2022-11-28 ENCOUNTER — ANESTHESIA (OUTPATIENT)
Dept: GASTROENTEROLOGY | Facility: HOSPITAL | Age: 77
End: 2022-11-28

## 2022-11-28 VITALS
RESPIRATION RATE: 20 BRPM | BODY MASS INDEX: 28.85 KG/M2 | TEMPERATURE: 98 F | SYSTOLIC BLOOD PRESSURE: 146 MMHG | HEART RATE: 79 BPM | WEIGHT: 201.5 LBS | DIASTOLIC BLOOD PRESSURE: 79 MMHG | OXYGEN SATURATION: 95 % | HEIGHT: 70 IN

## 2022-11-28 DIAGNOSIS — R13.19 ESOPHAGEAL DYSPHAGIA: ICD-10-CM

## 2022-11-28 RX ORDER — LIDOCAINE HYDROCHLORIDE 20 MG/ML
INJECTION, SOLUTION EPIDURAL; INFILTRATION; INTRACAUDAL; PERINEURAL AS NEEDED
Status: DISCONTINUED | OUTPATIENT
Start: 2022-11-28 | End: 2022-11-28

## 2022-11-28 RX ORDER — PROPOFOL 10 MG/ML
INJECTION, EMULSION INTRAVENOUS AS NEEDED
Status: DISCONTINUED | OUTPATIENT
Start: 2022-11-28 | End: 2022-11-28

## 2022-11-28 RX ORDER — SODIUM CHLORIDE, SODIUM LACTATE, POTASSIUM CHLORIDE, CALCIUM CHLORIDE 600; 310; 30; 20 MG/100ML; MG/100ML; MG/100ML; MG/100ML
INJECTION, SOLUTION INTRAVENOUS CONTINUOUS PRN
Status: DISCONTINUED | OUTPATIENT
Start: 2022-11-28 | End: 2022-11-28

## 2022-11-28 RX ADMIN — LIDOCAINE HYDROCHLORIDE 100 MG: 20 INJECTION, SOLUTION EPIDURAL; INFILTRATION; INTRACAUDAL; PERINEURAL at 09:56

## 2022-11-28 RX ADMIN — PROPOFOL 20 MG: 10 INJECTION, EMULSION INTRAVENOUS at 09:59

## 2022-11-28 RX ADMIN — SODIUM CHLORIDE, SODIUM LACTATE, POTASSIUM CHLORIDE, AND CALCIUM CHLORIDE: .6; .31; .03; .02 INJECTION, SOLUTION INTRAVENOUS at 09:53

## 2022-11-28 RX ADMIN — PROPOFOL 150 MG: 10 INJECTION, EMULSION INTRAVENOUS at 09:56

## 2022-11-28 RX ADMIN — PROPOFOL 30 MG: 10 INJECTION, EMULSION INTRAVENOUS at 10:01

## 2022-11-28 NOTE — ANESTHESIA PREPROCEDURE EVALUATION
Procedure:  EGD    Relevant Problems   CARDIO   (+) Essential hypertension   (+) Hypercholesterolemia      GI/HEPATIC   (+) Gastro-esophageal reflux disease without esophagitis   (+) Ulcer of esophagus      /RENAL   (+) Enlarged prostate with lower urinary tract symptoms (LUTS)      MUSCULOSKELETAL   (+) Degeneration of lumbosacral intervertebral disc   (+) Diaphragmatic hernia   (+) Gout      NEURO/PSYCH   (+) Anxiety   (+) History of colonic polyps      Hypertension    Renal calculi    GERD (gastroesophageal reflux disease)      ANTERIOR FUSION CERVICAL SPINE CERVICAL FUSION           Physical Exam    Airway    Mallampati score: I  TM Distance: >3 FB  Neck ROM: full     Dental       Cardiovascular  Cardiovascular exam normal    Pulmonary  Pulmonary exam normal     Other Findings        Anesthesia Plan  ASA Score- 2     Anesthesia Type- IV sedation with anesthesia with ASA Monitors  Additional Monitors:   Airway Plan:           Plan Factors-Exercise tolerance (METS): >4 METS  Chart reviewed  EKG reviewed  Imaging results reviewed  Existing labs reviewed  Patient summary reviewed  Induction- intravenous  Postoperative Plan-     Informed Consent- Anesthetic plan and risks discussed with patient  I personally reviewed this patient with the CRNA  Discussed and agreed on the Anesthesia Plan with the CRNA  Michele Oneill

## 2022-11-28 NOTE — ANESTHESIA POSTPROCEDURE EVALUATION
Post-Op Assessment Note    CV Status:  Stable  Pain Score: 0    Pain management: adequate     Mental Status:  Alert and awake   Hydration Status:  Euvolemic   PONV Controlled:  Controlled   Airway Patency:  Patent and adequate      Post Op Vitals Reviewed: Yes      Staff: CRNA         No notable events documented      BP   136/75   Temp     Pulse 72   Resp 20   SpO2 98

## 2022-12-06 ENCOUNTER — TELEPHONE (OUTPATIENT)
Dept: FAMILY MEDICINE CLINIC | Facility: CLINIC | Age: 77
End: 2022-12-06

## 2022-12-06 NOTE — TELEPHONE ENCOUNTER
Get plain muccinex over the counter for the cough and immodium for the diarrhea, if gets worse make appt

## 2022-12-06 NOTE — TELEPHONE ENCOUNTER
T/c from pt's wife -- states pt has had cough and chest congestion for about 10 days  It is worse at night  Just started with diarrhea 2 days ago as well  Wondering if Dr Candie Storey could send in a medication to help with his symptoms     Tested negative for covid at home       Please advise

## 2022-12-20 ENCOUNTER — TELEPHONE (OUTPATIENT)
Dept: OTHER | Facility: OTHER | Age: 77
End: 2022-12-20

## 2023-02-15 DIAGNOSIS — F41.9 ANXIETY: ICD-10-CM

## 2023-02-15 DIAGNOSIS — M51.37 DEGENERATION OF LUMBOSACRAL INTERVERTEBRAL DISC: ICD-10-CM

## 2023-02-15 NOTE — TELEPHONE ENCOUNTER
Pts spouse calling tor request a refill of pregabalin  PDMP REVIEWED VIA WEBSITE : YES   ACTIVE PAIN MANAGEMENT ON FILE : NONE         PATIENT ID PRESCRIPTION # FILLED WRITTEN DRUG LABEL QTY DAYS STRENGTH MME** PRESCRIBER PHARMACY PAYMENT REFILL #/AUTH STATE DETAIL   1 [id number] 11/29/2022 08/04/2022 Pregabalin (Capsule) 60 0 30 50 MG NA [facility]  [place] 0 / 3 [place]    1 [id number] 06/01/2022 12/08/2021 Pregabalin (Capsule) 60 0 30 50 [facility]  [place] 2 / 4 [place]    1 [id number] 04/03/2022 12/08/2021 Pregabalin (Capsule) 60 0 30 50 MG NA [facility]  Medicaid 01 / 4 [facility] on [address]     Please approve or refuse refill

## 2023-02-16 RX ORDER — PREGABALIN 50 MG/1
50 CAPSULE ORAL 2 TIMES DAILY
Qty: 60 CAPSULE | Refills: 5 | Status: SHIPPED | OUTPATIENT
Start: 2023-02-16

## 2023-03-18 ENCOUNTER — APPOINTMENT (OUTPATIENT)
Dept: LAB | Facility: HOSPITAL | Age: 78
End: 2023-03-18

## 2023-03-18 DIAGNOSIS — E78.00 HYPERCHOLESTEROLEMIA: ICD-10-CM

## 2023-03-18 DIAGNOSIS — Z12.5 SCREENING PSA (PROSTATE SPECIFIC ANTIGEN): ICD-10-CM

## 2023-03-18 LAB
ALBUMIN SERPL BCP-MCNC: 4.3 G/DL (ref 3.5–5)
ALP SERPL-CCNC: 58 U/L (ref 34–104)
ALT SERPL W P-5'-P-CCNC: 30 U/L (ref 7–52)
ANION GAP SERPL CALCULATED.3IONS-SCNC: 6 MMOL/L (ref 4–13)
AST SERPL W P-5'-P-CCNC: 24 U/L (ref 13–39)
BILIRUB SERPL-MCNC: 1.04 MG/DL (ref 0.2–1)
BUN SERPL-MCNC: 16 MG/DL (ref 5–25)
CALCIUM SERPL-MCNC: 9.1 MG/DL (ref 8.4–10.2)
CHLORIDE SERPL-SCNC: 105 MMOL/L (ref 96–108)
CHOLEST SERPL-MCNC: 173 MG/DL
CO2 SERPL-SCNC: 29 MMOL/L (ref 21–32)
CREAT SERPL-MCNC: 0.76 MG/DL (ref 0.6–1.3)
ERYTHROCYTE [DISTWIDTH] IN BLOOD BY AUTOMATED COUNT: 13.1 % (ref 11.6–15.1)
GFR SERPL CREATININE-BSD FRML MDRD: 89 ML/MIN/1.73SQ M
GLUCOSE P FAST SERPL-MCNC: 118 MG/DL (ref 65–99)
HCT VFR BLD AUTO: 49.7 % (ref 36.5–49.3)
HDLC SERPL-MCNC: 35 MG/DL
HGB BLD-MCNC: 16.5 G/DL (ref 12–17)
LDLC SERPL CALC-MCNC: 111 MG/DL (ref 0–100)
MCH RBC QN AUTO: 31.9 PG (ref 26.8–34.3)
MCHC RBC AUTO-ENTMCNC: 33.2 G/DL (ref 31.4–37.4)
MCV RBC AUTO: 96 FL (ref 82–98)
NONHDLC SERPL-MCNC: 138 MG/DL
PLATELET # BLD AUTO: 236 THOUSANDS/UL (ref 149–390)
PMV BLD AUTO: 8.9 FL (ref 8.9–12.7)
POTASSIUM SERPL-SCNC: 4.1 MMOL/L (ref 3.5–5.3)
PROT SERPL-MCNC: 7.2 G/DL (ref 6.4–8.4)
RBC # BLD AUTO: 5.17 MILLION/UL (ref 3.88–5.62)
SODIUM SERPL-SCNC: 140 MMOL/L (ref 135–147)
TRIGL SERPL-MCNC: 134 MG/DL
WBC # BLD AUTO: 6.03 THOUSAND/UL (ref 4.31–10.16)

## 2023-03-19 LAB — PSA SERPL-MCNC: 0.7 NG/ML (ref 0–4)

## 2023-03-31 DIAGNOSIS — M51.37 DEGENERATION OF LUMBOSACRAL INTERVERTEBRAL DISC: ICD-10-CM

## 2023-03-31 RX ORDER — CELECOXIB 200 MG/1
200 CAPSULE ORAL 2 TIMES DAILY
Qty: 60 CAPSULE | Refills: 3 | Status: SHIPPED | OUTPATIENT
Start: 2023-03-31

## 2023-04-03 ENCOUNTER — OFFICE VISIT (OUTPATIENT)
Dept: FAMILY MEDICINE CLINIC | Facility: CLINIC | Age: 78
End: 2023-04-03

## 2023-04-03 VITALS
WEIGHT: 209 LBS | HEART RATE: 89 BPM | HEIGHT: 70 IN | BODY MASS INDEX: 29.92 KG/M2 | OXYGEN SATURATION: 98 % | TEMPERATURE: 98 F | DIASTOLIC BLOOD PRESSURE: 82 MMHG | SYSTOLIC BLOOD PRESSURE: 140 MMHG

## 2023-04-03 DIAGNOSIS — J00 ACUTE RHINITIS: ICD-10-CM

## 2023-04-03 DIAGNOSIS — N40.1 BENIGN PROSTATIC HYPERPLASIA WITH URINARY FREQUENCY: ICD-10-CM

## 2023-04-03 DIAGNOSIS — G50.9 TRIGEMINAL NEUROPATHY: ICD-10-CM

## 2023-04-03 DIAGNOSIS — M51.37 DEGENERATION OF LUMBOSACRAL INTERVERTEBRAL DISC: ICD-10-CM

## 2023-04-03 DIAGNOSIS — I10 ESSENTIAL HYPERTENSION: Primary | ICD-10-CM

## 2023-04-03 DIAGNOSIS — E78.00 HYPERCHOLESTEROLEMIA: ICD-10-CM

## 2023-04-03 DIAGNOSIS — R35.0 BENIGN PROSTATIC HYPERPLASIA WITH URINARY FREQUENCY: ICD-10-CM

## 2023-04-03 RX ORDER — PREGABALIN 75 MG/1
75 CAPSULE ORAL 2 TIMES DAILY
Qty: 60 CAPSULE | Refills: 3 | Status: SHIPPED | OUTPATIENT
Start: 2023-04-03

## 2023-04-03 RX ORDER — TAMSULOSIN HYDROCHLORIDE 0.4 MG/1
0.4 CAPSULE ORAL
Qty: 30 CAPSULE | Refills: 5 | Status: SHIPPED | OUTPATIENT
Start: 2023-04-03

## 2023-04-03 RX ORDER — METHYLPREDNISOLONE 4 MG/1
TABLET ORAL
Qty: 21 TABLET | Refills: 0 | Status: SHIPPED | OUTPATIENT
Start: 2023-04-03 | End: 2023-04-09

## 2023-04-03 NOTE — PROGRESS NOTES
Name: Inez Mccann      :       MRN: [id number]  Encounter Provider: Cayla Cole DO  Encounter Date: 4/3/2023   Encounter department: [address]     1  Essential hypertension  Assessment & Plan:  Controlled, continue the amlodipine      2  Hypercholesterolemia  Assessment & Plan:  Controlled with his diet, continue to monitor    Orders:  -     Comprehensive metabolic panel; Future    3  Trigeminal neuropathy  Assessment & Plan:  Increase his Lyrica to 75 mg twice daily  4  Degeneration of lumbosacral intervertebral disc  Assessment & Plan:  Increase the Lyrica to 75 mg twice daily, continue the Celebrex    Orders:  -     pregabalin (LYRICA) 75 mg capsule; Take 1 capsule (75 mg total) by mouth 2 (two) times a day    5  Benign prostatic hyperplasia with urinary frequency  Assessment & Plan:  Trial of tamsulosin 0 4 mg daily    Orders:  -     tamsulosin (FLOMAX) 0 4 mg; Take 1 capsule (0 4 mg total) by mouth daily with dinner    6  Acute rhinitis  -     methylPREDNISolone 4 MG tablet therapy pack; Use as directed on package      BMI Counseling: Body mass index is 29 99 kg/m²  The BMI is above normal  Nutrition recommendations include decreasing portion sizes and encouraging healthy choices of fruits and vegetables  Exercise recommendations include moderate physical activity 150 minutes/week  No pharmacotherapy was ordered  Rationale for BMI follow-up plan is due to patient being overweight or obese  Depression Screening and Follow-up Plan: Patient was screened for depression during today's encounter  They screened negative with a PHQ-2 score of 0  Subjective      Patient comes in today for checkup, he does state that he feels the Lyrica is not as effective anymore  He would like to try to increase this  He does complain of some intermittent urinary frequency especially when he travels    He does note that he gets up 3-4 times at night  His last complaint is of some sinus congestion and pressure he has had for a week since he visited with his granddaughter who is ill  Review of Systems   Constitutional: Negative for chills, fatigue and fever  HENT: Positive for congestion  Negative for ear pain, hearing loss, postnasal drip, rhinorrhea and sore throat  Eyes: Negative for pain and visual disturbance  Respiratory: Negative for chest tightness, shortness of breath and wheezing  Cardiovascular: Negative for chest pain and leg swelling  Gastrointestinal: Negative for abdominal distention, abdominal pain, constipation, diarrhea and vomiting  Endocrine: Negative for cold intolerance and heat intolerance  Genitourinary: Positive for enuresis and urgency  Negative for difficulty urinating and frequency  Musculoskeletal: Negative for arthralgias and gait problem  Skin: Negative for color change  Neurological: Negative for dizziness, tremors, syncope, numbness and headaches  Hematological: Negative for adenopathy  Psychiatric/Behavioral: Negative for agitation, confusion and sleep disturbance  The patient is not nervous/anxious  Current Outpatient Medications on File Prior to Visit   Medication Sig   • amLODIPine (NORVASC) 5 mg tablet TAKE 1 TABLET (5 MG TOTAL) BY MOUTH IN THE MORNING  • celecoxib (CeleBREX) 200 mg capsule Take 1 capsule (200 mg total) by mouth 2 (two) times a day   • cholecalciferol (VITAMIN D3) 1,000 units tablet Take 1 tablet by mouth daily   • coenzyme Q-10 100 MG capsule Take 1 capsule by mouth Daily   • diazepam (VALIUM) 5 mg tablet Take 1 tablet (5 mg total) by mouth daily   • diclofenac sodium (VOLTAREN) 1 % APPLY 4 GRAMS ON THE SKIN 4 TIMES A DAY   DO NOT APPLY TO BROKEN OR INFLAMED SKIN   • Glucosamine 750 MG TABS Take 1 tablet by mouth daily   • Milk Thistle 1000 MG CAPS Take 1 capsule by mouth daily   • Multiple Vitamins-Minerals (OSTEO COMPLEX PO) Take by mouth   • "omeprazole (PriLOSEC) 20 mg delayed release capsule Take 20 mg by mouth daily    • PROBIOTIC PRODUCT PO Take by mouth   • TURMERIC PO Take 400 mg by mouth   • [DISCONTINUED] pregabalin (LYRICA) 50 mg capsule Take 1 capsule (50 mg total) by mouth 2 (two) times a day       Objective     /82 (BP Location: Left arm, Patient Position: Sitting, Cuff Size: Standard)   Pulse 89   Temp 98 °F (36 7 °C) (Tympanic)   Ht 5' 10\" (1 778 m)   Wt 94 8 kg (209 lb)   SpO2 98%   BMI 29 99 kg/m²     Physical Exam  Vitals and nursing note reviewed  Constitutional:       Appearance: He is well-developed  HENT:      Head: Normocephalic and atraumatic  Nose: Congestion present  Eyes:      General: No scleral icterus  Conjunctiva/sclera: Conjunctivae normal    Neck:      Thyroid: No thyromegaly  Cardiovascular:      Rate and Rhythm: Normal rate and regular rhythm  Heart sounds: Normal heart sounds  No murmur heard  Pulmonary:      Effort: Pulmonary effort is normal  No respiratory distress  Breath sounds: Normal breath sounds  No wheezing  Abdominal:      General: Bowel sounds are normal  There is no distension  Palpations: Abdomen is soft  Tenderness: There is no abdominal tenderness  Musculoskeletal:         General: No tenderness  Normal range of motion  Cervical back: Normal range of motion  Lumbar back: Spasms present  Lymphadenopathy:      Cervical: No cervical adenopathy  Skin:     General: Skin is warm and dry  Coloration: Skin is not pale  Findings: No rash  Neurological:      Mental Status: He is alert and oriented to person, place, and time  Cranial Nerves: No cranial nerve deficit     Psychiatric:         Behavior: Behavior normal        Alphonso Souza DO  "

## 2023-05-15 DIAGNOSIS — I10 ESSENTIAL HYPERTENSION: ICD-10-CM

## 2023-05-15 RX ORDER — AMLODIPINE BESYLATE 5 MG/1
TABLET ORAL
Qty: 90 TABLET | Refills: 1 | Status: SHIPPED | OUTPATIENT
Start: 2023-05-15 | End: 2023-05-19

## 2023-05-19 ENCOUNTER — OFFICE VISIT (OUTPATIENT)
Dept: FAMILY MEDICINE CLINIC | Facility: CLINIC | Age: 78
End: 2023-05-19

## 2023-05-19 VITALS
HEART RATE: 84 BPM | DIASTOLIC BLOOD PRESSURE: 72 MMHG | HEIGHT: 70 IN | SYSTOLIC BLOOD PRESSURE: 122 MMHG | WEIGHT: 212 LBS | OXYGEN SATURATION: 98 % | BODY MASS INDEX: 30.35 KG/M2 | TEMPERATURE: 98 F

## 2023-05-19 DIAGNOSIS — I10 ESSENTIAL HYPERTENSION: ICD-10-CM

## 2023-05-19 DIAGNOSIS — R60.0 LOCALIZED EDEMA: Primary | ICD-10-CM

## 2023-05-19 RX ORDER — LOSARTAN POTASSIUM 50 MG/1
50 TABLET ORAL DAILY
Qty: 30 TABLET | Refills: 5 | Status: SHIPPED | OUTPATIENT
Start: 2023-05-19

## 2023-05-19 NOTE — PROGRESS NOTES
Name: Mt Oneal      : 1887      MRN: [id number]  Encounter Provider: Fernie Hough DO  Encounter Date: 2023   [facility] [address] [facility] [address]     1  Localized edema    2  Essential hypertension  Assessment & Plan: Will discontinue amlodipine and replace with losartan    Orders:  -     losartan (COZAAR) 50 mg tablet; Take 1 tablet (50 mg total) by mouth daily           Subjective      Patient was in today complaining of swelling in his ankles and feet for the last week  He states that the swelling does slightly improve overnight  He does note that he has stopped taking the Flomax due to congestion and those symptoms have improved  Review of Systems   Constitutional: Negative for chills, fatigue and fever  HENT: Negative for congestion, ear pain, hearing loss, postnasal drip, rhinorrhea and sore throat  Eyes: Negative for pain and visual disturbance  Respiratory: Negative for chest tightness, shortness of breath and wheezing  Cardiovascular: Positive for leg swelling  Negative for chest pain  Gastrointestinal: Negative for abdominal distention, abdominal pain, constipation, diarrhea and vomiting  Endocrine: Negative for cold intolerance and heat intolerance  Genitourinary: Negative for difficulty urinating, frequency and urgency  Musculoskeletal: Negative for arthralgias and gait problem  Skin: Negative for color change  Neurological: Negative for dizziness, tremors, syncope, numbness and headaches  Hematological: Negative for adenopathy  Psychiatric/Behavioral: Negative for agitation, confusion and sleep disturbance  The patient is not nervous/anxious          Current Outpatient Medications on File Prior to Visit   Medication Sig   • celecoxib (CeleBREX) 200 mg capsule Take 1 capsule (200 mg total) by mouth 2 (two) times a day   • cholecalciferol (VITAMIN D3) 1,000 units tablet Take 1 tablet "by mouth daily   • coenzyme Q-10 100 MG capsule Take 1 capsule by mouth Daily   • diazepam (VALIUM) 5 mg tablet Take 1 tablet (5 mg total) by mouth daily   • diclofenac sodium (VOLTAREN) 1 % APPLY 4 GRAMS ON THE SKIN 4 TIMES A DAY  DO NOT APPLY TO BROKEN OR INFLAMED SKIN   • Glucosamine 750 MG TABS Take 1 tablet by mouth daily   • Milk Thistle 1000 MG CAPS Take 1 capsule by mouth daily   • Multiple Vitamins-Minerals (OSTEO COMPLEX PO) Take by mouth   • omeprazole (PriLOSEC) 20 mg delayed release capsule Take 20 mg by mouth daily    • pregabalin (LYRICA) 75 mg capsule Take 1 capsule (75 mg total) by mouth 2 (two) times a day   • PROBIOTIC PRODUCT PO Take by mouth   • TURMERIC PO Take 400 mg by mouth   • [DISCONTINUED] amLODIPine (NORVASC) 5 mg tablet TAKE 1 TABLET BY MOUTH DAILY IN THE MORNING  • [DISCONTINUED] tamsulosin (FLOMAX) 0 4 mg TAKE 1 CAPSULE BY MOUTH EVERY DAY WITH DINNER       Objective     /72 (BP Location: Left arm, Patient Position: Sitting, Cuff Size: Adult)   Pulse 84   Temp 98 °F (36 7 °C)   Ht 5' 10\" (1 778 m)   Wt 96 2 kg (212 lb)   SpO2 98%   BMI 30 42 kg/m²     Physical Exam  Vitals and nursing note reviewed  Constitutional:       Appearance: He is well-developed  HENT:      Head: Normocephalic  Eyes:      General: No scleral icterus  Conjunctiva/sclera: Conjunctivae normal    Neck:      Thyroid: No thyromegaly  Cardiovascular:      Rate and Rhythm: Normal rate and regular rhythm  Heart sounds: Normal heart sounds  No murmur heard  Pulmonary:      Effort: Pulmonary effort is normal  No respiratory distress  Breath sounds: Normal breath sounds  No wheezing  Abdominal:      General: Bowel sounds are normal  There is no distension  Palpations: Abdomen is soft  Tenderness: There is no abdominal tenderness  Musculoskeletal:         General: No tenderness  Normal range of motion  Cervical back: Normal range of motion        Right lower leg: Edema " present  Left lower leg: Edema present  Lymphadenopathy:      Cervical: No cervical adenopathy  Skin:     General: Skin is warm and dry  Coloration: Skin is not pale  Findings: No rash  Neurological:      Mental Status: He is alert and oriented to person, place, and time  Cranial Nerves: No cranial nerve deficit     Psychiatric:         Behavior: Behavior normal        Coretta Solorio DO

## 2023-06-10 DIAGNOSIS — I10 ESSENTIAL HYPERTENSION: ICD-10-CM

## 2023-06-10 RX ORDER — LOSARTAN POTASSIUM 50 MG/1
TABLET ORAL
Qty: 90 TABLET | Refills: 2 | Status: SHIPPED | OUTPATIENT
Start: 2023-06-10

## 2023-06-26 ENCOUNTER — OFFICE VISIT (OUTPATIENT)
Dept: FAMILY MEDICINE CLINIC | Facility: CLINIC | Age: 78
End: 2023-06-26
Payer: COMMERCIAL

## 2023-06-26 VITALS
OXYGEN SATURATION: 96 % | HEART RATE: 90 BPM | HEIGHT: 70 IN | BODY MASS INDEX: 30.06 KG/M2 | WEIGHT: 210 LBS | DIASTOLIC BLOOD PRESSURE: 82 MMHG | SYSTOLIC BLOOD PRESSURE: 150 MMHG | TEMPERATURE: 97.2 F

## 2023-06-26 DIAGNOSIS — H60.501 ACUTE OTITIS EXTERNA OF RIGHT EAR, UNSPECIFIED TYPE: Primary | ICD-10-CM

## 2023-06-26 PROCEDURE — 99214 OFFICE O/P EST MOD 30 MIN: CPT | Performed by: FAMILY MEDICINE

## 2023-06-26 RX ORDER — CIPROFLOXACIN AND DEXAMETHASONE 3; 1 MG/ML; MG/ML
4 SUSPENSION/ DROPS AURICULAR (OTIC) 2 TIMES DAILY
Qty: 7.5 ML | Refills: 0 | Status: SHIPPED | OUTPATIENT
Start: 2023-06-26 | End: 2023-07-03

## 2023-06-26 NOTE — PROGRESS NOTES
Name: Cristi Busch      : [id number]      MRN: [id number]  Encounter Provider: Alex Vuong DO  Encounter Date: 2023   Encounter department: [facility] [address]     1  Acute otitis externa of right ear, unspecified type  -     ciprofloxacin-dexamethasone (CIPRODEX) otic suspension; Administer 4 drops to the right ear 2 (two) times a day for 7 days       Per patient, had some issues with a steroid cream for poison ivy, but has tolerated steroids otherwise  Subjective      HPI     Notes that his right ear is bothering him for about 7 days  Notes that he has had an intermittent dull pain  About 3 days ago it then became blocked  Flushed it yesterday with small amount of wax removed but not much  Denies fever or chills  Notes that he has seasonal allergies this time of the year, using Flonase  Notes that he has had nasal congestion and headache  Review of Systems    Current Outpatient Medications on File Prior to Visit   Medication Sig   • celecoxib (CeleBREX) 200 mg capsule Take 1 capsule (200 mg total) by mouth 2 (two) times a day   • cholecalciferol (VITAMIN D3) 1,000 units tablet Take 1 tablet by mouth daily   • coenzyme Q-10 100 MG capsule Take 1 capsule by mouth Daily   • diazepam (VALIUM) 5 mg tablet Take 1 tablet (5 mg total) by mouth daily   • diclofenac sodium (VOLTAREN) 1 % APPLY 4 GRAMS ON THE SKIN 4 TIMES A DAY   DO NOT APPLY TO BROKEN OR INFLAMED SKIN   • Glucosamine 750 MG TABS Take 1 tablet by mouth daily   • losartan (COZAAR) 50 mg tablet TAKE 1 TABLET BY MOUTH EVERY DAY   • Milk Thistle 1000 MG CAPS Take 1 capsule by mouth daily   • Multiple Vitamins-Minerals (OSTEO COMPLEX PO) Take by mouth   • omeprazole (PriLOSEC) 20 mg delayed release capsule Take 20 mg by mouth daily    • pregabalin (LYRICA) 75 mg capsule Take 1 capsule (75 mg total) by mouth 2 (two) times a day   • PROBIOTIC PRODUCT PO Take by mouth   • "TURMERIC PO Take 400 mg by mouth       Objective     /82 (BP Location: Left arm, Patient Position: Sitting, Cuff Size: Large)   Pulse 90   Temp (!) 97 2 °F (36 2 °C)   Ht 5' 10\" (1 778 m)   Wt 95 3 kg (210 lb)   SpO2 96%   BMI 30 13 kg/m²     Physical Exam  Vitals reviewed  Constitutional:       General: He is not in acute distress  Appearance: Normal appearance  He is not ill-appearing  HENT:      Head: Normocephalic and atraumatic  Right Ear: External ear normal  Decreased hearing noted  Drainage and tenderness present  A middle ear effusion is present  No mastoid tenderness  Tympanic membrane is injected  Tympanic membrane is not perforated  Left Ear: Tympanic membrane, ear canal and external ear normal       Nose: Nose normal       Mouth/Throat:      Mouth: Mucous membranes are moist    Eyes:      Extraocular Movements: Extraocular movements intact  Conjunctiva/sclera: Conjunctivae normal    Pulmonary:      Effort: Pulmonary effort is normal  No respiratory distress  Breath sounds: No wheezing  Neurological:      General: No focal deficit present  Mental Status: He is alert  Mental status is at baseline          Jermain Jones DO  "

## 2023-07-11 ENCOUNTER — APPOINTMENT (OUTPATIENT)
Dept: LAB | Facility: HOSPITAL | Age: 78
End: 2023-07-11
Payer: COMMERCIAL

## 2023-07-11 DIAGNOSIS — R31.0 HEMATURIA, GROSS: ICD-10-CM

## 2023-07-11 DIAGNOSIS — E78.00 HYPERCHOLESTEROLEMIA: ICD-10-CM

## 2023-07-11 PROCEDURE — 87086 URINE CULTURE/COLONY COUNT: CPT

## 2023-07-12 LAB — BACTERIA UR CULT: NORMAL

## 2023-07-22 DIAGNOSIS — M51.37 DEGENERATION OF LUMBOSACRAL INTERVERTEBRAL DISC: ICD-10-CM

## 2023-07-22 RX ORDER — CELECOXIB 200 MG/1
200 CAPSULE ORAL 2 TIMES DAILY
Qty: 60 CAPSULE | Refills: 2 | Status: SHIPPED | OUTPATIENT
Start: 2023-07-22

## 2023-08-01 DIAGNOSIS — M51.37 DEGENERATION OF LUMBOSACRAL INTERVERTEBRAL DISC: ICD-10-CM

## 2023-08-01 RX ORDER — PREGABALIN 75 MG/1
75 CAPSULE ORAL 2 TIMES DAILY
Qty: 60 CAPSULE | Refills: 3 | Status: SHIPPED | OUTPATIENT
Start: 2023-08-01

## 2023-08-01 NOTE — TELEPHONE ENCOUNTER
Medication:  PDMP       [id number] 06/02/2023 04/03/2023 Pregabalin (Capsule) 60.0 30 75 MG NA [facility] Medicaid 2 / 3 PA     1 [id number] 05/02/2023 04/03/2023 Pregabalin (Capsule) 60.0 30 75 MG NA [facility] Medicaid 1 / 3 PA    1 [id number] 04/03/2023 04/03/2023 Pregabalin (Capsule) 60.0 30 75 MG NA [facility]  Medicaid 0 / 3 PA          Active agreement on file -No

## 2023-09-14 ENCOUNTER — OFFICE VISIT (OUTPATIENT)
Dept: FAMILY MEDICINE CLINIC | Facility: CLINIC | Age: 78
End: 2023-09-14
Payer: COMMERCIAL

## 2023-09-14 VITALS
DIASTOLIC BLOOD PRESSURE: 84 MMHG | OXYGEN SATURATION: 94 % | HEIGHT: 70 IN | SYSTOLIC BLOOD PRESSURE: 150 MMHG | HEART RATE: 95 BPM | WEIGHT: 211 LBS | BODY MASS INDEX: 30.21 KG/M2 | TEMPERATURE: 97.1 F

## 2023-09-14 DIAGNOSIS — K13.70 UNSPECIFIED LESIONS OF ORAL MUCOSA: Primary | ICD-10-CM

## 2023-09-14 PROCEDURE — 99213 OFFICE O/P EST LOW 20 MIN: CPT | Performed by: PHYSICIAN ASSISTANT

## 2023-09-14 NOTE — PROGRESS NOTES
Assessment/Plan:          Diagnoses and all orders for this visit:    Unspecified lesions of oral mucosa  -     CBC and differential; Future  -     Comprehensive metabolic panel; Future  -     TSH, 3rd generation with Free T4 reflex; Future        Pt will follow up with dentist.    Subjective:      Patient ID: Darion Diane is a 78 y.o. male. Mouth Lesions   The current episode started more than 2 weeks ago. The onset was sudden. The problem occurs frequently. The problem has been gradually worsening. The problem is moderate. Relieved by: gargles. Nothing aggravates the symptoms. Associated symptoms include mouth sores. Pertinent negatives include no orthopnea, no fever, no decreased vision, no double vision, no eye itching, no photophobia, no abdominal pain, no constipation, no diarrhea, no nausea, no vomiting, no congestion, no ear discharge, no ear pain, no headaches, no hearing loss, no rhinorrhea, no sore throat, no stridor, no swollen glands, no muscle aches, no neck pain, no neck stiffness, no cough, no URI, no wheezing, no rash, no eye discharge, no eye pain and no eye redness. There were no sick contacts. The following portions of the patient's history were reviewed and updated as appropriate:   He has a past medical history of GERD (gastroesophageal reflux disease), Hypertension, and Renal calculi.,  does not have any pertinent problems on file. ,   has a past surgical history that includes Anterior fusion cervical spine (25 years ago); Cervical fusion; Gallbladder surgery; Prostate surgery; Cholecystectomy; Brain surgery; pr esophagogastroduodenoscopy transoral diagnostic (N/A, 5/1/2019); and pr colonoscopy flx dx w/collj spec when pfrmd (N/A, 5/1/2019). ,  family history includes Arthritis in his sister and sister; Cancer in his father and sister; Diabetes in his sister;  Heart disease in his mother; No Known Problems in his brother, maternal grandfather, maternal grandmother, paternal grandfather, and paternal grandmother; Stroke in his mother. ,   reports that he has never smoked. He has never used smokeless tobacco. He reports that he does not drink alcohol and does not use drugs. ,  is allergic to sulfamethoxazole-trimethoprim, gadolinium derivatives, cortisone, iodinated contrast media, and hydrocortisone. .  Current Outpatient Medications   Medication Sig Dispense Refill   • celecoxib (CeleBREX) 200 mg capsule TAKE 1 CAPSULE BY MOUTH TWICE A DAY 60 capsule 2   • cholecalciferol (VITAMIN D3) 1,000 units tablet Take 1 tablet by mouth daily     • coenzyme Q-10 100 MG capsule Take 1 capsule by mouth Daily     • diazepam (VALIUM) 5 mg tablet Take 1 tablet (5 mg total) by mouth daily 30 tablet 1   • diclofenac sodium (VOLTAREN) 1 % APPLY 4 GRAMS ON THE SKIN 4 TIMES A DAY. DO NOT APPLY TO BROKEN OR INFLAMED SKIN  1   • Glucosamine 750 MG TABS Take 1 tablet by mouth daily     • losartan (COZAAR) 50 mg tablet TAKE 1 TABLET BY MOUTH EVERY DAY 90 tablet 2   • Milk Thistle 1000 MG CAPS Take 1 capsule by mouth daily     • Multiple Vitamins-Minerals (OSTEO COMPLEX PO) Take by mouth     • omeprazole (PriLOSEC) 20 mg delayed release capsule Take 20 mg by mouth daily      • pregabalin (LYRICA) 75 mg capsule Take 1 capsule (75 mg total) by mouth 2 (two) times a day 60 capsule 3   • PROBIOTIC PRODUCT PO Take by mouth     • TURMERIC PO Take 400 mg by mouth       No current facility-administered medications for this visit. Review of Systems   Constitutional: Negative for appetite change, chills, diaphoresis and fever. HENT: Positive for mouth sores. Negative for congestion, ear discharge, ear pain, hearing loss, rhinorrhea and sore throat. Eyes: Negative for double vision, photophobia, pain, discharge, redness and itching. Respiratory: Negative for cough, wheezing and stridor. Cardiovascular: Negative for orthopnea.    Gastrointestinal: Negative for abdominal pain, constipation, diarrhea, nausea and vomiting. Musculoskeletal: Negative for neck pain. Skin: Negative for rash. Neurological: Negative for headaches. Objective:  Vitals:    09/14/23 1008   BP: 150/84   BP Location: Left arm   Patient Position: Sitting   Cuff Size: Standard   Pulse: 95   Temp: (!) 97.1 °F (36.2 °C)   TempSrc: Tympanic   SpO2: 94%   Weight: 95.7 kg (211 lb)   Height: 5' 10" (1.778 m)     Body mass index is 30.28 kg/m². Physical Exam  Vitals and nursing note reviewed. Constitutional:       Appearance: Normal appearance. HENT:      Head: Normocephalic. Mouth/Throat:      Lips: Pink. No lesions. Mouth: Mucous membranes are moist. Oral lesions present. No injury. Dentition: Gum lesions present. Tongue: No lesions. Tongue does not deviate from midline. Palate: No mass and lesions. Pharynx: Oropharynx is clear. Uvula midline. No posterior oropharyngeal erythema or uvula swelling. Comments: Multiple pink colored lesions of anterior. lower gum midline, several other light pick lesions on lateral oral mucosa. Cardiovascular:      Rate and Rhythm: Normal rate. Pulmonary:      Effort: Pulmonary effort is normal.   Neurological:      Mental Status: He is alert and oriented to person, place, and time.    Psychiatric:         Mood and Affect: Mood normal.         Behavior: Behavior normal.

## 2023-09-21 ENCOUNTER — TELEPHONE (OUTPATIENT)
Dept: FAMILY MEDICINE CLINIC | Facility: CLINIC | Age: 78
End: 2023-09-21

## 2023-09-21 ENCOUNTER — APPOINTMENT (OUTPATIENT)
Dept: LAB | Facility: HOSPITAL | Age: 78
End: 2023-09-21
Payer: COMMERCIAL

## 2023-09-21 DIAGNOSIS — K13.70 UNSPECIFIED LESIONS OF ORAL MUCOSA: ICD-10-CM

## 2023-09-21 LAB
ALBUMIN SERPL BCP-MCNC: 4.3 G/DL (ref 3.5–5)
ALP SERPL-CCNC: 59 U/L (ref 34–104)
ALT SERPL W P-5'-P-CCNC: 31 U/L (ref 7–52)
ANION GAP SERPL CALCULATED.3IONS-SCNC: 6 MMOL/L
AST SERPL W P-5'-P-CCNC: 23 U/L (ref 13–39)
BASOPHILS # BLD AUTO: 0.03 THOUSANDS/ÂΜL (ref 0–0.1)
BASOPHILS NFR BLD AUTO: 1 % (ref 0–1)
BILIRUB SERPL-MCNC: 0.83 MG/DL (ref 0.2–1)
BUN SERPL-MCNC: 26 MG/DL (ref 5–25)
CALCIUM SERPL-MCNC: 9.5 MG/DL (ref 8.4–10.2)
CHLORIDE SERPL-SCNC: 106 MMOL/L (ref 96–108)
CO2 SERPL-SCNC: 29 MMOL/L (ref 21–32)
CREAT SERPL-MCNC: 0.86 MG/DL (ref 0.6–1.3)
EOSINOPHIL # BLD AUTO: 0.19 THOUSAND/ÂΜL (ref 0–0.61)
EOSINOPHIL NFR BLD AUTO: 3 % (ref 0–6)
ERYTHROCYTE [DISTWIDTH] IN BLOOD BY AUTOMATED COUNT: 13.7 % (ref 11.6–15.1)
GFR SERPL CREATININE-BSD FRML MDRD: 84 ML/MIN/1.73SQ M
GLUCOSE P FAST SERPL-MCNC: 111 MG/DL (ref 65–99)
HCT VFR BLD AUTO: 51.2 % (ref 36.5–49.3)
HGB BLD-MCNC: 16.9 G/DL (ref 12–17)
IMM GRANULOCYTES # BLD AUTO: 0.02 THOUSAND/UL (ref 0–0.2)
IMM GRANULOCYTES NFR BLD AUTO: 0 % (ref 0–2)
LYMPHOCYTES # BLD AUTO: 2.58 THOUSANDS/ÂΜL (ref 0.6–4.47)
LYMPHOCYTES NFR BLD AUTO: 39 % (ref 14–44)
MCH RBC QN AUTO: 31.5 PG (ref 26.8–34.3)
MCHC RBC AUTO-ENTMCNC: 33 G/DL (ref 31.4–37.4)
MCV RBC AUTO: 95 FL (ref 82–98)
MONOCYTES # BLD AUTO: 0.92 THOUSAND/ÂΜL (ref 0.17–1.22)
MONOCYTES NFR BLD AUTO: 14 % (ref 4–12)
NEUTROPHILS # BLD AUTO: 2.9 THOUSANDS/ÂΜL (ref 1.85–7.62)
NEUTS SEG NFR BLD AUTO: 43 % (ref 43–75)
NRBC BLD AUTO-RTO: 0 /100 WBCS
PLATELET # BLD AUTO: 219 THOUSANDS/UL (ref 149–390)
PMV BLD AUTO: 9.1 FL (ref 8.9–12.7)
POTASSIUM SERPL-SCNC: 4.6 MMOL/L (ref 3.5–5.3)
PROT SERPL-MCNC: 7.2 G/DL (ref 6.4–8.4)
RBC # BLD AUTO: 5.37 MILLION/UL (ref 3.88–5.62)
SODIUM SERPL-SCNC: 141 MMOL/L (ref 135–147)
TSH SERPL DL<=0.05 MIU/L-ACNC: 3.5 UIU/ML (ref 0.45–4.5)
WBC # BLD AUTO: 6.64 THOUSAND/UL (ref 4.31–10.16)

## 2023-09-21 PROCEDURE — 84443 ASSAY THYROID STIM HORMONE: CPT

## 2023-09-21 PROCEDURE — 36415 COLL VENOUS BLD VENIPUNCTURE: CPT

## 2023-09-21 PROCEDURE — 85025 COMPLETE CBC W/AUTO DIFF WBC: CPT

## 2023-09-29 ENCOUNTER — TELEPHONE (OUTPATIENT)
Dept: FAMILY MEDICINE CLINIC | Facility: CLINIC | Age: 78
End: 2023-09-29

## 2023-09-29 NOTE — TELEPHONE ENCOUNTER
PA submitted and approved today. Stu Feng Hamilton: [id number] - PA Case ID: [id number]  Need help? Call us at [phone]  Outcome   Approvedtoday  Your PA request has been approved. Additional information will be provided in the approval communication.  (Message [id number])

## 2023-10-10 ENCOUNTER — OFFICE VISIT (OUTPATIENT)
Age: 78
End: 2023-10-10
Payer: COMMERCIAL

## 2023-10-10 VITALS
HEART RATE: 87 BPM | OXYGEN SATURATION: 98 % | SYSTOLIC BLOOD PRESSURE: 130 MMHG | BODY MASS INDEX: 29.92 KG/M2 | HEIGHT: 70 IN | DIASTOLIC BLOOD PRESSURE: 70 MMHG | WEIGHT: 209 LBS

## 2023-10-10 DIAGNOSIS — R13.14 PHARYNGOESOPHAGEAL DYSPHAGIA: Primary | ICD-10-CM

## 2023-10-10 DIAGNOSIS — K31.A11 INTESTINAL METAPLASIA OF ANTRUM OF STOMACH WITHOUT DYSPLASIA: ICD-10-CM

## 2023-10-10 PROCEDURE — 99214 OFFICE O/P EST MOD 30 MIN: CPT | Performed by: INTERNAL MEDICINE

## 2023-10-10 NOTE — PROGRESS NOTES
[facility]      Chief Complaint: Swallowing issues    HPI:  Evelio Jaquez is a 70 y.o.  male who presents with past history of esophageal dysphagia with stricture and esophageal ulcer. His GERD symptoms have improved. He underwent EGD with biopsy and dilation last year. The dilation lasted for some time but for several months the patient has been experiencing swallowing issues. However now when he tries to initiate a swallow he starts to have difficulty and coughs the food back up. It appears that once it is down it goes down as long as he washes it down. This happened most recently with an apple and he coughed up quite a bit. There was no evidence of a diverticulum on his last EGD. He has not had a barium swallow. He has no weight loss or melena. No throat pain. No weakness of his facial muscles or deviation of his tongue. No other associated symptomatology. He has a history of intestinal metaplasia on biopsy. .      Review of Systems:   Constitutional: No fever or chills, feels well, no tiredness, no recent weight gain or weight loss. HENT: No complaints of earache, no hearing loss, no nosebleeds, no nasal discharge, no sore throat, no hoarseness. Eyes: No complaints of eye pain, no red eyes, no discharge from eyes, no itchy eyes. Cardiovascular: No complaints of slow heart rate, no fast heart rate, no chest pain, no palpitations, no leg claudication, no lower extremity edema. Respiratory: No complaints of shortness of breath, no wheezing, no cough, no SOB on exertion, no orthopnea. Gastrointestinal: As noted in HPI  Genitourinary: No complaints of dysuria, no incontinence, no hesitancy, no nocturia. Musculoskeletal: Hip pain  Neurological: No complaints of headache, no confusion, no convulsions, no numbness or tingling, occasional dizziness no fainting, no limb weakness, no difficulty walking.     Skin: No complaints of skin rash or skin lesions, no itching, no skin wound, no dry skin. Hematological/Lymphatic: No complaints of swollen glands, does not bleed easy. Allergic/Immunologic: No immunocompromised state. Endocrine:  No complaints of polyuria, no polydipsia. Psychiatric/Behavioral: is not suicidal, no sleep disturbances, no anxiety or depression, no change in personality, no emotional problems. Historical Information   Past Medical History:   Diagnosis Date   • GERD (gastroesophageal reflux disease)    • Hypertension    • Renal calculi      Past Surgical History:   Procedure Laterality Date   • ANTERIOR FUSION CERVICAL SPINE  25 years ago    C5, C6, C7   • BRAIN SURGERY     • CERVICAL FUSION      vertebral    • CHOLECYSTECTOMY     • GALLBLADDER SURGERY     • [place] COLONOSCOPY FLX DX W/COLLJ SPEC WHEN PFRMD N/A 5/1/2019    Procedure: COLONOSCOPY;  Surgeon: Iqra Luis MD;  Location: [facility]; Service: Gastroenterology   • [place] ESOPHAGOGASTRODUODENOSCOPY TRANSORAL DIAGNOSTIC N/A 5/1/2019    Procedure: ESOPHAGOGASTRODUODENOSCOPY (EGD); Surgeon: Iqra Luis MD;  Location: [facility];   Service: Gastroenterology   • PROSTATE SURGERY       Social History   Social History     Substance and Sexual Activity   Alcohol Use No     Social History     Substance and Sexual Activity   Drug Use No     Social History     Tobacco Use   Smoking Status Never   Smokeless Tobacco Never     Family History   Problem Relation Age of Onset   • Heart disease Mother    • Stroke Mother    • Cancer Father    • Diabetes Sister         mellitus    • Arthritis Sister    • Cancer Sister    • No Known Problems Brother    • No Known Problems Maternal Grandmother    • No Known Problems Maternal Grandfather    • No Known Problems Paternal Grandmother    • No Known Problems Paternal Grandfather    • Arthritis Sister          Current Medications: has a current medication list which includes the following prescription(s): celecoxib, cholecalciferol, coenzyme q-10, diazepam, diclofenac sodium, glucosamine, losartan, milk thistle, multiple vitamins-minerals, omeprazole, pregabalin, probiotic product, and turmeric. Vital Signs: /70   Pulse 87   Ht 5' 10" (1.778 m)   Wt 94.8 kg (209 lb)   SpO2 98%   BMI 29.99 kg/m²       Physical Exam:   Constitutional  General Appearance: No acute distress, well appearing and well nourished  Head  Normocephalic  Eyes  Conjunctivae and lids: No swelling, erythema, or discharge. Pupils and irises: Equal, round and reactive to light. Ears, Nose, Mouth, and Throat  External inspection of ears and nose: Normal  Nasal mucosa, septum and turbinates: Normal without edema or erythema/   Oropharynx: Normal with no erythema, edema, exudate or lesions. No lingual deviation  Neck  Normal range of motion. Neck supple. Cardiovascular  Auscultation of the heart: Normal rate and rhythm, normal S1 and S2 without murmurs. Examination of the extremities for edema and/or varicosities: Normal  Pulmonary/Chest  Respiratory effort: No increased work of breathing or signs of respiratory distress. Auscultation of lungs: Clear to auscultation, equal breath sounds bilaterally, no wheezes, rales, no rhonchi. Abdomen  Abdomen: Non-tender, no masses. Liver and spleen: No hepatomegaly or splenomegaly. Musculoskeletal  Gait and station: normal.  Digits and Nails: normal without clubbing or cyanosis. Inspection/palpation of joints, bones, and muscles: Normal  Neurological  No nystagmus or asterixis. Skin  Skin and subcutaneous tissue: Normal without rashes or lesions. Lymphatic  Palpation of the lymph nodes in neck: No lymphadenopathy.    Psychiatric  Orientation to person, place and time: Normal.  Mood and affect: Normal.         Labs:  Lab Results   Component Value Date    ALT 31 09/21/2023    AST 23 09/21/2023    BUN 26 (H) 09/21/2023    CALCIUM 9.5 09/21/2023     09/21/2023    CO2 29 09/21/2023    CREATININE 0.86 09/21/2023    HDL 35 (L) 03/18/2023    HCT 51.2 (H) 09/21/2023    HGB 16.9 09/21/2023    HGBA1C 6.3 05/03/2018     09/21/2023    K 4.6 09/21/2023    PSA 0.7 03/18/2023    TRIG 134 03/18/2023    WBC 6.64 09/21/2023         X-Rays & Procedures:   FL barium swallow video w speech    (Results Pending)           ______________________________________________________________________      Assessment & Plan:     Diagnoses and all orders for this visit:    Pharyngoesophageal dysphagia  -     FL barium swallow video w speech; Future  -     EGD; Future    Intestinal metaplasia of antrum of stomach without dysplasia  -     EGD; Future      Patient will be scheduled for repeat EGD as well as modified barium swallow with speech. The current problem sounds more preesophageal.  He will continue to have very soft foods and to wash them down with liquids which appears to work fairly well for him. He will avoid foods such as apples and hard foods which seem to cause the issue. Further recommendations will depend on study results.   He will continue his other current medical management

## 2023-10-10 NOTE — H&P (VIEW-ONLY)
[facility]      Chief Complaint: Swallowing issues    HPI:  Geovanni Ac is a 70 y.o.  male who presents with past history of esophageal dysphagia with stricture and esophageal ulcer. His GERD symptoms have improved. He underwent EGD with biopsy and dilation last year. The dilation lasted for some time but for several months the patient has been experiencing swallowing issues. However now when he tries to initiate a swallow he starts to have difficulty and coughs the food back up. It appears that once it is down it goes down as long as he washes it down. This happened most recently with an apple and he coughed up quite a bit. There was no evidence of a diverticulum on his last EGD. He has not had a barium swallow. He has no weight loss or melena. No throat pain. No weakness of his facial muscles or deviation of his tongue. No other associated symptomatology. He has a history of intestinal metaplasia on biopsy. .      Review of Systems:   Constitutional: No fever or chills, feels well, no tiredness, no recent weight gain or weight loss. HENT: No complaints of earache, no hearing loss, no nosebleeds, no nasal discharge, no sore throat, no hoarseness. Eyes: No complaints of eye pain, no red eyes, no discharge from eyes, no itchy eyes. Cardiovascular: No complaints of slow heart rate, no fast heart rate, no chest pain, no palpitations, no leg claudication, no lower extremity edema. Respiratory: No complaints of shortness of breath, no wheezing, no cough, no SOB on exertion, no orthopnea. Gastrointestinal: As noted in HPI  Genitourinary: No complaints of dysuria, no incontinence, no hesitancy, no nocturia. Musculoskeletal: Hip pain  Neurological: No complaints of headache, no confusion, no convulsions, no numbness or tingling, occasional dizziness no fainting, no limb weakness, no difficulty walking.     Skin: No complaints of skin rash or skin lesions, no itching, no skin wound, no dry skin. Hematological/Lymphatic: No complaints of swollen glands, does not bleed easy. Allergic/Immunologic: No immunocompromised state. Endocrine:  No complaints of polyuria, no polydipsia. Psychiatric/Behavioral: is not suicidal, no sleep disturbances, no anxiety or depression, no change in personality, no emotional problems. Historical Information   Past Medical History:   Diagnosis Date   • GERD (gastroesophageal reflux disease)    • Hypertension    • Renal calculi      Past Surgical History:   Procedure Laterality Date   • ANTERIOR FUSION CERVICAL SPINE  25 years ago    C5, C6, C7   • BRAIN SURGERY     • CERVICAL FUSION      vertebral    • CHOLECYSTECTOMY     • GALLBLADDER SURGERY     • IN COLONOSCOPY FLX DX W/COLLJ SPEC WHEN PFRMD N/A 5/1/2019    Procedure: COLONOSCOPY;  Surgeon: Shay Alicea MD;  Location: [facility]; Service: Gastroenterology   • [place] ESOPHAGOGASTRODUODENOSCOPY TRANSORAL DIAGNOSTIC N/A 5/1/2019    Procedure: ESOPHAGOGASTRODUODENOSCOPY (EGD); Surgeon: Shay Alicea MD;  Location: [facility];   Service: Gastroenterology   • PROSTATE SURGERY       Social History   Social History     Substance and Sexual Activity   Alcohol Use No     Social History     Substance and Sexual Activity   Drug Use No     Social History     Tobacco Use   Smoking Status Never   Smokeless Tobacco Never     Family History   Problem Relation Age of Onset   • Heart disease Mother    • Stroke Mother    • Cancer Father    • Diabetes Sister         mellitus    • Arthritis Sister    • Cancer Sister    • No Known Problems Brother    • No Known Problems Maternal Grandmother    • No Known Problems Maternal Grandfather    • No Known Problems Paternal Grandmother    • No Known Problems Paternal Grandfather    • Arthritis Sister          Current Medications: has a current medication list which includes the following prescription(s): celecoxib, cholecalciferol, coenzyme q-10, diazepam, diclofenac sodium, glucosamine, losartan, milk thistle, multiple vitamins-minerals, omeprazole, pregabalin, probiotic product, and turmeric. Vital Signs: /70   Pulse 87   Ht 5' 10" (1.778 m)   Wt 94.8 kg (209 lb)   SpO2 98%   BMI 29.99 kg/m²       Physical Exam:   Constitutional  General Appearance: No acute distress, well appearing and well nourished  Head  Normocephalic  Eyes  Conjunctivae and lids: No swelling, erythema, or discharge. Pupils and irises: Equal, round and reactive to light. Ears, Nose, Mouth, and Throat  External inspection of ears and nose: Normal  Nasal mucosa, septum and turbinates: Normal without edema or erythema/   Oropharynx: Normal with no erythema, edema, exudate or lesions. No lingual deviation  Neck  Normal range of motion. Neck supple. Cardiovascular  Auscultation of the heart: Normal rate and rhythm, normal S1 and S2 without murmurs. Examination of the extremities for edema and/or varicosities: Normal  Pulmonary/Chest  Respiratory effort: No increased work of breathing or signs of respiratory distress. Auscultation of lungs: Clear to auscultation, equal breath sounds bilaterally, no wheezes, rales, no rhonchi. Abdomen  Abdomen: Non-tender, no masses. Liver and spleen: No hepatomegaly or splenomegaly. Musculoskeletal  Gait and station: normal.  Digits and Nails: normal without clubbing or cyanosis. Inspection/palpation of joints, bones, and muscles: Normal  Neurological  No nystagmus or asterixis. Skin  Skin and subcutaneous tissue: Normal without rashes or lesions. Lymphatic  Palpation of the lymph nodes in neck: No lymphadenopathy.    Psychiatric  Orientation to person, place and time: Normal.  Mood and affect: Normal.         Labs:  Lab Results   Component Value Date    ALT 31 09/21/2023    AST 23 09/21/2023    BUN 26 (H) 09/21/2023    CALCIUM 9.5 09/21/2023     09/21/2023    CO2 29 09/21/2023    CREATININE 0.86 09/21/2023    HDL 35 (L) 03/18/2023    HCT 51.2 (H) 09/21/2023    HGB 16.9 09/21/2023    HGBA1C 6.3 05/03/2018     09/21/2023    K 4.6 09/21/2023    PSA 0.7 03/18/2023    TRIG 134 03/18/2023    WBC 6.64 09/21/2023         X-Rays & Procedures:   FL barium swallow video w speech    (Results Pending)           ______________________________________________________________________      Assessment & Plan:     Diagnoses and all orders for this visit:    Pharyngoesophageal dysphagia  -     FL barium swallow video w speech; Future  -     EGD; Future    Intestinal metaplasia of antrum of stomach without dysplasia  -     EGD; Future      Patient will be scheduled for repeat EGD as well as modified barium swallow with speech. The current problem sounds more preesophageal.  He will continue to have very soft foods and to wash them down with liquids which appears to work fairly well for him. He will avoid foods such as apples and hard foods which seem to cause the issue. Further recommendations will depend on study results.   He will continue his other current medical management

## 2023-10-10 NOTE — PATIENT INSTRUCTIONS
Scheduled date of EGD(as of today): 10/18/23  Physician performing EGD: Keegan Black  Location of EGD: [facility]  Instructions reviewed with patient by: Alban JOHN  Clearances:

## 2023-10-10 NOTE — LETTER
October 10, 2023     Bebe Benson DO  [address] [place] [address]    Patient: Sania Varghese   YOB: 1945   Date of Visit: 10/10/2023       Dear Dr. Donovan Salts: Thank you for referring Gayle Sharma to me for evaluation. Below are my notes for this consultation. If you have questions, please do not hesitate to call me. I look forward to following your patient along with you. Sincerely,        Radha Hernández MD        CC: No Recipients    Radha Hernández MD  10/10/2023  2:17 PM  Incomplete  [facility]      Chief Complaint: Swallowing issues    HPI:  Sania Varghese is a 70 y.o.  male who presents with past history of esophageal dysphagia with stricture and esophageal ulcer. His GERD symptoms have improved. He underwent EGD with biopsy and dilation last year. The dilation lasted for some time but for several months the patient has been experiencing swallowing issues. However now when he tries to initiate a swallow he starts to have difficulty and coughs the food back up. It appears that once it is down it goes down as long as he washes it down. This happened most recently with an apple and he coughed up quite a bit. There was no evidence of a diverticulum on his last EGD. He has not had a barium swallow. He has no weight loss or melena. No throat pain. No weakness of his facial muscles or deviation of his tongue. No other associated symptomatology. He has a history of intestinal metaplasia on biopsy. .      Review of Systems:   Constitutional: No fever or chills, feels well, no tiredness, no recent weight gain or weight loss. HENT: No complaints of earache, no hearing loss, no nosebleeds, no nasal discharge, no sore throat, no hoarseness. Eyes: No complaints of eye pain, no red eyes, no discharge from eyes, no itchy eyes.   Cardiovascular: No complaints of slow heart rate, no fast heart rate, no chest pain, no palpitations, no leg claudication, no lower extremity edema. Respiratory: No complaints of shortness of breath, no wheezing, no cough, no SOB on exertion, no orthopnea. Gastrointestinal: As noted in HPI  Genitourinary: No complaints of dysuria, no incontinence, no hesitancy, no nocturia. Musculoskeletal: Hip pain  Neurological: No complaints of headache, no confusion, no convulsions, no numbness or tingling, occasional dizziness no fainting, no limb weakness, no difficulty walking. Skin: No complaints of skin rash or skin lesions, no itching, no skin wound, no dry skin. Hematological/Lymphatic: No complaints of swollen glands, does not bleed easy. Allergic/Immunologic: No immunocompromised state. Endocrine:  No complaints of polyuria, no polydipsia. Psychiatric/Behavioral: is not suicidal, no sleep disturbances, no anxiety or depression, no change in personality, no emotional problems. Historical Information   Past Medical History:   Diagnosis Date   • GERD (gastroesophageal reflux disease)    • Hypertension    • Renal calculi      Past Surgical History:   Procedure Laterality Date   • ANTERIOR FUSION CERVICAL SPINE  25 years ago    C5, C6, C7   • BRAIN SURGERY     • CERVICAL FUSION      vertebral    • CHOLECYSTECTOMY     • GALLBLADDER SURGERY     • [place] COLONOSCOPY FLX DX W/COLLJ SPEC WHEN PFRMD N/A 5/1/2019    Procedure: COLONOSCOPY;  Surgeon: Iqra Luis MD;  Location: [facility]; Service: Gastroenterology   • [place] ESOPHAGOGASTRODUODENOSCOPY TRANSORAL DIAGNOSTIC N/A 5/1/2019    Procedure: ESOPHAGOGASTRODUODENOSCOPY (EGD); Surgeon: Iqra Luis MD;  [facility];   Service: Gastroenterology   • PROSTATE SURGERY       Social History   Social History     Substance and Sexual Activity   Alcohol Use No     Social History     Substance and Sexual Activity   Drug Use No     Social History     Tobacco Use   Smoking Status Never   Smokeless Tobacco Never     Family History Problem Relation Age of Onset   • Heart disease Mother    • Stroke Mother    • Cancer Father    • Diabetes Sister         mellitus    • Arthritis Sister    • Cancer Sister    • No Known Problems Brother    • No Known Problems Maternal Grandmother    • No Known Problems Maternal Grandfather    • No Known Problems Paternal Grandmother    • No Known Problems Paternal Grandfather    • Arthritis Sister          Current Medications: has a current medication list which includes the following prescription(s): celecoxib, cholecalciferol, coenzyme q-10, diazepam, diclofenac sodium, glucosamine, losartan, milk thistle, multiple vitamins-minerals, omeprazole, pregabalin, probiotic product, and turmeric. Vital Signs: /70   Pulse 87   Ht 5' 10" (1.778 m)   Wt 94.8 kg (209 lb)   SpO2 98%   BMI 29.99 kg/m²       Physical Exam:   Constitutional  General Appearance: No acute distress, well appearing and well nourished  Head  Normocephalic  Eyes  Conjunctivae and lids: No swelling, erythema, or discharge. Pupils and irises: Equal, round and reactive to light. Ears, Nose, Mouth, and Throat  External inspection of ears and nose: Normal  Nasal mucosa, septum and turbinates: Normal without edema or erythema/   Oropharynx: Normal with no erythema, edema, exudate or lesions. No lingual deviation  Neck  Normal range of motion. Neck supple. Cardiovascular  Auscultation of the heart: Normal rate and rhythm, normal S1 and S2 without murmurs. Examination of the extremities for edema and/or varicosities: Normal  Pulmonary/Chest  Respiratory effort: No increased work of breathing or signs of respiratory distress. Auscultation of lungs: Clear to auscultation, equal breath sounds bilaterally, no wheezes, rales, no rhonchi. Abdomen  Abdomen: Non-tender, no masses. Liver and spleen: No hepatomegaly or splenomegaly.    Musculoskeletal  Gait and station: normal.  Digits and Nails: normal without clubbing or cyanosis. Inspection/palpation of joints, bones, and muscles: Normal  Neurological  No nystagmus or asterixis. Skin  Skin and subcutaneous tissue: Normal without rashes or lesions. Lymphatic  Palpation of the lymph nodes in neck: No lymphadenopathy. Psychiatric  Orientation to person, place and time: Normal.  Mood and affect: Normal.         Labs:  Lab Results   Component Value Date    ALT 31 09/21/2023    AST 23 09/21/2023    BUN 26 (H) 09/21/2023    CALCIUM 9.5 09/21/2023     09/21/2023    CO2 29 09/21/2023    CREATININE 0.86 09/21/2023    HDL 35 (L) 03/18/2023    HCT 51.2 (H) 09/21/2023    HGB 16.9 09/21/2023    HGBA1C 6.3 05/03/2018     09/21/2023    K 4.6 09/21/2023    PSA 0.7 03/18/2023    TRIG 134 03/18/2023    WBC 6.64 09/21/2023         X-Rays & Procedures:   FL barium swallow video w speech    (Results Pending)           ______________________________________________________________________      Assessment & Plan:     Diagnoses and all orders for this visit:    Pharyngoesophageal dysphagia  -     FL barium swallow video w speech; Future  -     EGD; Future    Intestinal metaplasia of antrum of stomach without dysplasia  -     EGD; Future      Patient will be scheduled for repeat EGD as well as modified barium swallow with speech. The current problem sounds more preesophageal.  He will continue to have very soft foods and to wash them down with liquids which appears to work fairly well for him. He will avoid foods such as apples and hard foods which seem to cause the issue. Further recommendations will depend on study results.   He will continue his other current medical management

## 2023-10-16 ENCOUNTER — TELEPHONE (OUTPATIENT)
Age: 78
End: 2023-10-16

## 2023-10-16 NOTE — TELEPHONE ENCOUNTER
Called hao baker, confirming confirming EGD. Reminded patient that he needs a ride and nothing to eat. drink after midnight until after EGD

## 2023-10-18 ENCOUNTER — ANESTHESIA (OUTPATIENT)
Dept: GASTROENTEROLOGY | Facility: HOSPITAL | Age: 78
End: 2023-10-18

## 2023-10-18 ENCOUNTER — TELEPHONE (OUTPATIENT)
Age: 78
End: 2023-10-18

## 2023-10-18 ENCOUNTER — HOSPITAL ENCOUNTER (OUTPATIENT)
Dept: GASTROENTEROLOGY | Facility: HOSPITAL | Age: 78
Setting detail: OUTPATIENT SURGERY
Discharge: HOME | End: 2023-10-18
Attending: INTERNAL MEDICINE
Payer: COMMERCIAL

## 2023-10-18 ENCOUNTER — ANESTHESIA EVENT (OUTPATIENT)
Dept: GASTROENTEROLOGY | Facility: HOSPITAL | Age: 78
End: 2023-10-18

## 2023-10-18 ENCOUNTER — OFFICE VISIT (OUTPATIENT)
Dept: FAMILY MEDICINE CLINIC | Facility: CLINIC | Age: 78
End: 2023-10-18
Payer: COMMERCIAL

## 2023-10-18 VITALS
OXYGEN SATURATION: 94 % | DIASTOLIC BLOOD PRESSURE: 78 MMHG | HEART RATE: 74 BPM | WEIGHT: 206.13 LBS | TEMPERATURE: 97.4 F | BODY MASS INDEX: 29.51 KG/M2 | SYSTOLIC BLOOD PRESSURE: 136 MMHG | HEIGHT: 70 IN | RESPIRATION RATE: 15 BRPM

## 2023-10-18 VITALS
DIASTOLIC BLOOD PRESSURE: 88 MMHG | HEIGHT: 70 IN | BODY MASS INDEX: 29.92 KG/M2 | WEIGHT: 209 LBS | TEMPERATURE: 96.3 F | OXYGEN SATURATION: 94 % | HEART RATE: 64 BPM | SYSTOLIC BLOOD PRESSURE: 156 MMHG

## 2023-10-18 DIAGNOSIS — M51.37 DEGENERATION OF LUMBOSACRAL INTERVERTEBRAL DISC: ICD-10-CM

## 2023-10-18 DIAGNOSIS — K31.A11 INTESTINAL METAPLASIA OF ANTRUM OF STOMACH WITHOUT DYSPLASIA: ICD-10-CM

## 2023-10-18 DIAGNOSIS — R13.10 DYSPHAGIA, UNSPECIFIED TYPE: ICD-10-CM

## 2023-10-18 DIAGNOSIS — H04.129 DRY EYE: ICD-10-CM

## 2023-10-18 DIAGNOSIS — R13.14 PHARYNGOESOPHAGEAL DYSPHAGIA: ICD-10-CM

## 2023-10-18 DIAGNOSIS — I10 ESSENTIAL HYPERTENSION: Primary | ICD-10-CM

## 2023-10-18 DIAGNOSIS — R19.8 ABNORMAL FINDINGS ON ESOPHAGOGASTRODUODENOSCOPY (EGD): Primary | ICD-10-CM

## 2023-10-18 PROCEDURE — 99214 OFFICE O/P EST MOD 30 MIN: CPT | Performed by: FAMILY MEDICINE

## 2023-10-18 PROCEDURE — 88305 TISSUE EXAM BY PATHOLOGIST: CPT | Performed by: PATHOLOGY

## 2023-10-18 PROCEDURE — 43239 EGD BIOPSY SINGLE/MULTIPLE: CPT | Performed by: INTERNAL MEDICINE

## 2023-10-18 PROCEDURE — 43248 EGD GUIDE WIRE INSERTION: CPT | Performed by: INTERNAL MEDICINE

## 2023-10-18 RX ORDER — OLOPATADINE HYDROCHLORIDE 1 MG/ML
1 SOLUTION/ DROPS OPHTHALMIC 2 TIMES DAILY
Qty: 5 ML | Refills: 3 | Status: SHIPPED | OUTPATIENT
Start: 2023-10-18

## 2023-10-18 RX ORDER — PROPOFOL 10 MG/ML
INJECTION, EMULSION INTRAVENOUS AS NEEDED
Status: DISCONTINUED | OUTPATIENT
Start: 2023-10-18 | End: 2023-10-18

## 2023-10-18 RX ORDER — SODIUM CHLORIDE, SODIUM LACTATE, POTASSIUM CHLORIDE, CALCIUM CHLORIDE 600; 310; 30; 20 MG/100ML; MG/100ML; MG/100ML; MG/100ML
INJECTION, SOLUTION INTRAVENOUS CONTINUOUS PRN
Status: DISCONTINUED | OUTPATIENT
Start: 2023-10-18 | End: 2023-10-18

## 2023-10-18 RX ORDER — LIDOCAINE HYDROCHLORIDE 20 MG/ML
INJECTION, SOLUTION EPIDURAL; INFILTRATION; INTRACAUDAL; PERINEURAL AS NEEDED
Status: DISCONTINUED | OUTPATIENT
Start: 2023-10-18 | End: 2023-10-18

## 2023-10-18 RX ORDER — PREGABALIN 150 MG/1
150 CAPSULE ORAL 2 TIMES DAILY
Qty: 60 CAPSULE | Refills: 1 | Status: SHIPPED | OUTPATIENT
Start: 2023-10-18

## 2023-10-18 RX ADMIN — SODIUM CHLORIDE, SODIUM LACTATE, POTASSIUM CHLORIDE, AND CALCIUM CHLORIDE: .6; .31; .03; .02 INJECTION, SOLUTION INTRAVENOUS at 07:30

## 2023-10-18 RX ADMIN — PROPOFOL 150 MG: 10 INJECTION, EMULSION INTRAVENOUS at 08:13

## 2023-10-18 RX ADMIN — LIDOCAINE HYDROCHLORIDE 100 MG: 20 INJECTION, SOLUTION EPIDURAL; INFILTRATION; INTRACAUDAL at 08:13

## 2023-10-18 NOTE — ANESTHESIA PREPROCEDURE EVALUATION
Procedure:  EGD    Relevant Problems   CARDIO   (+) Essential hypertension   (+) Hypercholesterolemia      GI/HEPATIC   (+) Gastro-esophageal reflux disease without esophagitis   (+) Ulcer of esophagus      /RENAL   (+) Enlarged prostate with lower urinary tract symptoms (LUTS)      MUSCULOSKELETAL   (+) Degeneration of lumbosacral intervertebral disc   (+) Diaphragmatic hernia   (+) Gout      NEURO/PSYCH   (+) Anxiety   (+) Occipital neuralgia of left side      Cervical fusion C4-6    H/o epidural hematoma and synovial cyst with evacuation in 2018 at [facility]    Physical Exam    Airway    Mallampati score: II  TM Distance: <3 FB  Neck ROM: limited     Dental   No notable dental hx     Cardiovascular      Pulmonary      Other Findings        Anesthesia Plan  ASA Score- 2     Anesthesia Type- IV sedation with anesthesia with ASA Monitors. Additional Monitors:     Airway Plan:            Plan Factors-Exercise tolerance (METS): >4 METS. Chart reviewed. Patient summary reviewed. Induction- intravenous. Postoperative Plan-     Informed Consent- Anesthetic plan and risks discussed with patient. I personally reviewed this patient with the CRNA. Discussed and agreed on the Anesthesia Plan with the CRNA. Cielo Tate

## 2023-10-18 NOTE — ANESTHESIA POSTPROCEDURE EVALUATION
Post-Op Assessment Note    CV Status:  Stable    Pain management: adequate     Mental Status:  Alert and awake   Hydration Status:  Euvolemic   PONV Controlled:  Controlled   Airway Patency:  Patent      Post Op Vitals Reviewed: Yes      Staff: Anesthesiologist         No notable events documented.     /83 (10/18/23 0824)    Temp (!) 97.4 °F (36.3 °C) (10/18/23 0824)    Pulse 83 (10/18/23 0824)   Resp 17 (10/18/23 0824)    SpO2 93 % (10/18/23 0824)

## 2023-10-18 NOTE — PROGRESS NOTES
Name: Jennifer Morales      :       MRN: [id number]  Encounter Provider: Aleksandra Iglesias DO  Encounter Date: 10/18/2023   Encounter department: [address] [place] [address] [facility]     1. Essential hypertension  Assessment & Plan:  Borderline control, continue his losartan, recheck his blood pressure in 6 months      2. Degeneration of lumbosacral intervertebral disc  Assessment & Plan:  Increase his Lyrica to 150 mg twice daily, continue his Celebrex    Orders:  -     pregabalin (LYRICA) 150 mg capsule; Take 1 capsule (150 mg total) by mouth 2 (two) times a day    3. Dry eye  -     olopatadine (PATANOL) 0.1 % ophthalmic solution; Administer 1 drop to both eyes 2 (two) times a day           Subjective      Patient comes in today for checkup, he does complain of having dry scratchy eyes specially in the morning. He has tried using rewetting drops relief. He also complains of about the pain in his legs becoming slightly worse. He feels that the Lyrica is not as effective as it once was. Review of Systems   Constitutional:  Negative for chills, fatigue and fever. HENT:  Negative for congestion, ear pain, hearing loss, postnasal drip, rhinorrhea and sore throat. Eyes:  Positive for itching. Negative for pain and visual disturbance. Respiratory:  Negative for chest tightness, shortness of breath and wheezing. Cardiovascular:  Negative for chest pain and leg swelling. Gastrointestinal:  Negative for abdominal distention, abdominal pain, constipation, diarrhea and vomiting. Endocrine: Negative for cold intolerance and heat intolerance. Genitourinary:  Negative for difficulty urinating, frequency and urgency. Musculoskeletal:  Positive for arthralgias (Bilateral legs). Negative for gait problem. Skin:  Negative for color change. Neurological:  Negative for dizziness, tremors, syncope, numbness and headaches.    Hematological:  Negative for adenopathy. Psychiatric/Behavioral:  Negative for agitation, confusion and sleep disturbance. The patient is not nervous/anxious. Current Outpatient Medications on File Prior to Visit   Medication Sig   • celecoxib (CeleBREX) 200 mg capsule TAKE 1 CAPSULE BY MOUTH TWICE A DAY   • cholecalciferol (VITAMIN D3) 1,000 units tablet Take 1 tablet by mouth daily   • coenzyme Q-10 100 MG capsule Take 1 capsule by mouth Daily   • diazepam (VALIUM) 5 mg tablet Take 1 tablet (5 mg total) by mouth daily   • diclofenac sodium (VOLTAREN) 1 % APPLY 4 GRAMS ON THE SKIN 4 TIMES A DAY. DO NOT APPLY TO BROKEN OR INFLAMED SKIN   • Glucosamine 750 MG TABS Take 1 tablet by mouth daily   • losartan (COZAAR) 50 mg tablet TAKE 1 TABLET BY MOUTH EVERY DAY   • Milk Thistle 1000 MG CAPS Take 1 capsule by mouth daily   • Multiple Vitamins-Minerals (OSTEO COMPLEX PO) Take by mouth   • omeprazole (PriLOSEC) 20 mg delayed release capsule Take 20 mg by mouth daily    • PROBIOTIC PRODUCT PO Take by mouth   • TURMERIC PO Take 400 mg by mouth   • [DISCONTINUED] pregabalin (LYRICA) 75 mg capsule Take 1 capsule (75 mg total) by mouth 2 (two) times a day       Objective     /88 (BP Location: Left arm, Patient Position: Sitting, Cuff Size: Standard)   Pulse 64   Temp (!) 96.3 °F (35.7 °C) (Tympanic)   Ht 5' 10" (1.778 m)   Wt 94.8 kg (209 lb)   SpO2 94%   BMI 29.99 kg/m²     Physical Exam  Vitals and nursing note reviewed. Constitutional:       General: He is not in acute distress. Appearance: Normal appearance. He is well-developed. HENT:      Head: Normocephalic. Eyes:      General: No scleral icterus. Conjunctiva/sclera: Conjunctivae normal.   Neck:      Thyroid: No thyromegaly. Cardiovascular:      Rate and Rhythm: Normal rate and regular rhythm. Heart sounds: Normal heart sounds. No murmur heard. Pulmonary:      Effort: Pulmonary effort is normal. No respiratory distress.       Breath sounds: Normal breath sounds. No wheezing. Abdominal:      General: Bowel sounds are normal. There is no distension. Palpations: Abdomen is soft. Tenderness: There is no abdominal tenderness. Musculoskeletal:         General: No tenderness. Normal range of motion. Cervical back: Normal range of motion. Right lower leg: Edema (Trace) present. Left lower leg: Edema (Trace) present. Lymphadenopathy:      Cervical: No cervical adenopathy. Skin:     General: Skin is warm and dry. Coloration: Skin is not pale. Findings: No rash. Neurological:      Mental Status: He is alert and oriented to person, place, and time. Cranial Nerves: No cranial nerve deficit.    Psychiatric:         Behavior: Behavior normal.       Bebe Benson DO

## 2023-10-18 NOTE — TELEPHONE ENCOUNTER
----- Message from Hallie Quinones MD sent at 10/18/2023  8:20 AM EDT -----  Please order gastric emptying study for dysphagia, abnormal EGD with residual food

## 2023-10-18 NOTE — INTERVAL H&P NOTE
H&P reviewed. After examining the patient I find no changes in the patients condition since the H&P had been written.     Vitals:    10/18/23 0718   BP: 154/76   Pulse: 76   Resp: 16   Temp: (!) 97.2 °F (36.2 °C)   SpO2: 94%

## 2023-10-19 DIAGNOSIS — M51.37 DEGENERATION OF LUMBOSACRAL INTERVERTEBRAL DISC: ICD-10-CM

## 2023-10-19 RX ORDER — CELECOXIB 200 MG/1
200 CAPSULE ORAL 2 TIMES DAILY
Qty: 60 CAPSULE | Refills: 2 | Status: SHIPPED | OUTPATIENT
Start: 2023-10-19

## 2023-10-24 PROCEDURE — 88305 TISSUE EXAM BY PATHOLOGIST: CPT | Performed by: PATHOLOGY

## 2023-11-15 ENCOUNTER — TELEPHONE (OUTPATIENT)
Age: 78
End: 2023-11-15

## 2023-11-15 ENCOUNTER — HOSPITAL ENCOUNTER (OUTPATIENT)
Dept: NUCLEAR MEDICINE | Facility: HOSPITAL | Age: 78
Discharge: HOME | End: 2023-11-15
Attending: INTERNAL MEDICINE
Payer: COMMERCIAL

## 2023-11-15 DIAGNOSIS — R13.10 DYSPHAGIA, UNSPECIFIED TYPE: ICD-10-CM

## 2023-11-15 DIAGNOSIS — R19.8 ABNORMAL FINDINGS ON ESOPHAGOGASTRODUODENOSCOPY (EGD): ICD-10-CM

## 2023-11-15 PROCEDURE — A9541 TC99M SULFUR COLLOID: HCPCS

## 2023-11-15 PROCEDURE — 78264 GASTRIC EMPTYING IMG STUDY: CPT

## 2023-11-15 PROCEDURE — G1004 CDSM NDSC: HCPCS

## 2023-11-15 NOTE — TELEPHONE ENCOUNTER
Stella Romero MD  P Gastroenterology [facility]  Please let the patient know that his gastric emptying study did show some delay. Please send him a copy of a gastroparesis diet, tell him to follow it closely, and have him call me in 2 weeks with his progress.

## 2023-11-16 ENCOUNTER — HOSPITAL ENCOUNTER (OUTPATIENT)
Dept: RADIOLOGY | Facility: HOSPITAL | Age: 78
End: 2023-11-16
Attending: INTERNAL MEDICINE
Payer: COMMERCIAL

## 2023-11-16 DIAGNOSIS — R13.14 PHARYNGOESOPHAGEAL DYSPHAGIA: ICD-10-CM

## 2023-11-16 PROCEDURE — 74230 X-RAY XM SWLNG FUNCJ C+: CPT

## 2023-11-16 PROCEDURE — 92611 MOTION FLUOROSCOPY/SWALLOW: CPT

## 2023-11-16 NOTE — PROCEDURES
Speech Pathology - Modified Barium Swallow Study    Patient Name: Ruth Sloan    WRFDP'O Date: 11/16/2023     Problem List  Active Problems: There are no active Hospital Problems. Past Medical History  Past Medical History:   Diagnosis Date    GERD (gastroesophageal reflux disease)     Hypertension     Renal calculi        Past Surgical History  Past Surgical History:   Procedure Laterality Date    ANTERIOR FUSION CERVICAL SPINE  25 years ago    C5, C6, C7    BRAIN SURGERY      CERVICAL FUSION      vertebral     CHOLECYSTECTOMY      GALLBLADDER SURGERY      TX COLONOSCOPY FLX DX W/COLLJ SPEC WHEN PFRMD N/A 5/1/2019    Procedure: COLONOSCOPY;  Surgeon: Iqra Luis MD;  Location: [facility]; Service: Gastroenterology    TX ESOPHAGOGASTRODUODENOSCOPY TRANSORAL DIAGNOSTIC N/A 5/1/2019    Procedure: ESOPHAGOGASTRODUODENOSCOPY (EGD); Surgeon: Iqra Luis MD;  Location: [facility]; Service: Gastroenterology    PROSTATE SURGERY         Assessment Summary:    Pt presents with mild oropharyngeal dysphagia characterized by prolonged mastication, trace oral residue on tongue, and collection of residue at valleculae, tongue base, and pharyngeal wall. Noted patient hesitation for swallow initiation of solids; pt reports that it might be psychological due to the previous occurrences of coughing up solids. Noted cervical osteophyte at C4-6. No aurora aspiration or laryngeal penetration. Recommended choosing softer solids, adding moisture when able, and utilizing liquid wash to aid in bolus movement. Note: Images are available for review in PACS as desired.     Recommendations:   Recommended Diet: regular diet and thin liquids -- choose softer options, add moisture  Recommended Form of Medications: whole with liquid   Aspiration precautions and compensatory swallowing strategies: upright posture, slow rate of feeding, small bites/sips, alternating bites and sips, and add moisture   Consider referral to:    SLP Dysphagia therapy recommended:     Results Reviewed with: patient   Pt/Family Education: initiated. Pt verbalized understanding. Patient Stated Goal:    Dysphagia Goals per SLP: none at this time      General Information;  Pt is a 70 y.o. male with a PMH remarkable for GERD, esophageal dysphagia . Current concerns for dysphagia include difficulty initiating a swallow and coughing food back up. Patient reports it occurs with hard solids such as an apple; improves with liquid wash. Patient points to throat when asked where it feels stuck. Patient recently underwent EGD on 10/18/23 with dilation. Patient reports some improvement with swallowing since procedure. MBS was recommended to assess oropharyngeal stage swallowing skills at this time. Prior MBS: none of file     Oral Mechanism Exam  Facial: symmetrical  Labial: WFL  Lingual: WFL  Velum: unable to visualize  Mandible: adequate ROM  Dentition: adequate  Vocal quality: clear/adequate   Volitional Cough: strong/productive   Respiratory Status: on RA    Tracheostomy: n/a    Pt was viewed sitting upright in the lateral and AP positions. Trials administered were consistent with MBSImP Validated Protocol: Pt was given 5-mL thin liquid x2, 20-mL cup sip thin, 40-mL sequential swallow thin, 5-mL nectar thick, 20-mL cup sip nectar thick, 40-mL sequential swallow nectar thick, 5-mL honey thick, 5-mL pudding, ½ cookie coated with 3-mL pudding, and 5-mL pudding in the AP position.     Initial view observations/comments: Clear view of the upper airway and Cervical bony growths      8-Point Penetration-Aspiration Scale   Thin liquid 1 - Material does not enter the airway   Nectar thick liquid 1 - Material does not enter the airway   Honey thick liquid 1 - Material does not enter the airway   Puree (pudding) 1 - Material does not enter the airway   Solid 1 - Material does not enter the airway     Strategies and Efficacy: use of liquid wash after solids; reduced residue      Aspiration Response and Efficacy:  NA    MBS IMP Rating    ORAL Impairment  Compinent 1--Lip Closure  Judged at any point during the swallow. 0 - No labial escape    Component 2--Tongue Control During Bolus Hold  Judged on held liquid boluses only and prior to productive tongue movement. 0 - Cohesive bolus between tongue to palatal seal    Component 3--Bolus Preparation/Mastication  Judged only during presentation of 1/2 shortbread cookie coated in pudding. 0 - Timely and efficient chewing and mashing    Component 4--Bolus Transport/Lingual Motion  Judged after first productive tongue movement for oral bolus transport. 1 - Delayed initiation of tongue motion    Component 5--Oral Residue  Judged after first swallow or after the last swallow of the sequential swallow task. 1 - Trace residue lining oral structures   Location   C - Tongue    Component 6--Initiation of Pharyngeal Swallow  Judged at first movement of the brisk superior-anterior hyoid trajectory. 0 - Bolus head at posterior angle of ramus (first hyoid excursion)      PHARYNGEAL Impairment  Component 7--Soft Palate Elevation  Judged during maximum displacement of soft palate. 0 - No bolus between the soft palate (SP)/pharyngeal wall (PW)    Component 8--Laryngeal Elevation  Judged when epiglottis is in its most horizontal position. 0 - Complete superior movement of thyroid cartilage with complete approximation of arytenoids to epiglottic petiole    Component 9--Anterior Hyoid Excursion  Judged at height of swallow/maximal anterior hyoid displacement. 0 - Complete anterior movement    Component 10--Epiglottic Movement  Judged at height of swallow/maximal anterior hyoid displacement. 0 - Complete inversion    Component 11--Laryngeal Vestibular Closure  Judged at height of swallow/maximal anterior hyoid displacement.   0 - Complete; no air/contrast in laryngeal vestibule    Component 12--Pharyngeal Stripping Wave  Judged during the full duration of the pharyngeal swallow. 1 - Present - diminished    Component 13--Pharyngeal Contraction  Judged in AP view at rest and throughout maximum movement of structures. 0 - Complete    Component 14--Pharyngoesophageal Segment Opening  Judged during maximum distension of PES and throughout opening and closure. 1 - Partial distension/partial duration; partial obstruction to flow    Component 15--Tongue Base (TB) Retraction  Judged during maximum retraction of the tongue base. 1 - Trace column of contrast or air between TB and PW    Component 16--Pharyngeal Residue  Judged after first swallow or after the last swallow of the sequential swallow task.   2 - Collection of residue within or on pharyngeal structures   Location   A - Tongue Base, B - Valleculae, and C - Pharyngeal wall      ESOPHAGEAL Impairment  Component 17--Esophageal Clearance Upright Position  Judged in AP view during bolus transit through the oral cavity to the LES  0 - Complete clearance; esophageal coating

## 2023-12-15 DIAGNOSIS — M51.37 DEGENERATION OF LUMBOSACRAL INTERVERTEBRAL DISC: ICD-10-CM

## 2023-12-15 RX ORDER — PREGABALIN 150 MG/1
150 CAPSULE ORAL 2 TIMES DAILY
Qty: 60 CAPSULE | Refills: 1 | Status: SHIPPED | OUTPATIENT
Start: 2023-12-15

## 2024-01-15 DIAGNOSIS — M51.37 DEGENERATION OF LUMBOSACRAL INTERVERTEBRAL DISC: ICD-10-CM

## 2024-01-15 RX ORDER — CELECOXIB 200 MG/1
200 CAPSULE ORAL 2 TIMES DAILY
Qty: 60 CAPSULE | Refills: 2 | Status: SHIPPED | OUTPATIENT
Start: 2024-01-15

## 2024-01-18 ENCOUNTER — HOSPITAL ENCOUNTER (OUTPATIENT)
Dept: MRI IMAGING | Facility: HOSPITAL | Age: 79
End: 2024-01-18
Payer: COMMERCIAL

## 2024-01-18 ENCOUNTER — HOSPITAL ENCOUNTER (OUTPATIENT)
Dept: RADIOLOGY | Facility: HOSPITAL | Age: 79
End: 2024-01-18
Payer: COMMERCIAL

## 2024-01-18 DIAGNOSIS — M89.9 LESION OF POSTERIOR FOSSA OF CRANIAL CAVITY: ICD-10-CM

## 2024-01-18 DIAGNOSIS — M89.9 DISORDER OF BONE, UNSPECIFIED: ICD-10-CM

## 2024-01-18 PROCEDURE — 72040 X-RAY EXAM NECK SPINE 2-3 VW: CPT

## 2024-01-18 PROCEDURE — G1004 CDSM NDSC: HCPCS

## 2024-01-18 PROCEDURE — 70551 MRI BRAIN STEM W/O DYE: CPT

## 2024-01-18 PROCEDURE — 72141 MRI NECK SPINE W/O DYE: CPT

## 2024-01-19 PROCEDURE — 88305 TISSUE EXAM BY PATHOLOGIST: CPT | Performed by: OPHTHALMOLOGY

## 2024-01-22 ENCOUNTER — LAB REQUISITION (OUTPATIENT)
Dept: LAB | Facility: HOSPITAL | Age: 79
End: 2024-01-22
Attending: OPHTHALMOLOGY
Payer: COMMERCIAL

## 2024-01-22 DIAGNOSIS — D48.5 NEOPLASM OF UNCERTAIN BEHAVIOR OF SKIN: ICD-10-CM

## 2024-01-23 LAB
CASE RPRT: NORMAL
CLINICAL INFO: NORMAL
PATH REPORT.FINAL DX SPEC: NORMAL
PATH REPORT.GROSS SPEC: NORMAL

## 2024-01-29 DIAGNOSIS — H04.129 DRY EYE: ICD-10-CM

## 2024-01-29 RX ORDER — OLOPATADINE HYDROCHLORIDE 1 MG/ML
SOLUTION/ DROPS OPHTHALMIC
Qty: 15 ML | Refills: 1 | Status: SHIPPED | OUTPATIENT
Start: 2024-01-29

## 2024-02-05 ENCOUNTER — OFFICE VISIT (OUTPATIENT)
Dept: URGENT CARE | Facility: CLINIC | Age: 79
End: 2024-02-05
Payer: COMMERCIAL

## 2024-02-05 VITALS
TEMPERATURE: 98.2 F | HEART RATE: 90 BPM | RESPIRATION RATE: 18 BRPM | OXYGEN SATURATION: 97 % | WEIGHT: 214.2 LBS | DIASTOLIC BLOOD PRESSURE: 78 MMHG | BODY MASS INDEX: 30.73 KG/M2 | SYSTOLIC BLOOD PRESSURE: 146 MMHG

## 2024-02-05 DIAGNOSIS — L23.7 POISON IVY DERMATITIS: Primary | ICD-10-CM

## 2024-02-05 PROCEDURE — 99213 OFFICE O/P EST LOW 20 MIN: CPT | Performed by: STUDENT IN AN ORGANIZED HEALTH CARE EDUCATION/TRAINING PROGRAM

## 2024-02-05 RX ORDER — PREDNISONE 10 MG/1
TABLET ORAL DAILY
Qty: 35 TABLET | Refills: 0 | Status: SHIPPED | OUTPATIENT
Start: 2024-02-05 | End: 2024-02-19

## 2024-02-05 NOTE — PROGRESS NOTES
[facility]        NAME: Ronnie Colin is a 78 y.o. male  : 1945    MRN: [id number]  DATE: 2024  TIME: 4:44 PM    Assessment and Orders   Poison ivy dermatitis [L23.7]  1. Poison ivy dermatitis  predniSONE 10 mg tablet            Plan and Discussion      Symptoms and exam consistent with poison ivy dermatitis. Will treat with long course of oral steroids to prevent rebound dermatitis.       Discussed ED precautions including (but not limited to)  Difficultly breathing or shortness of breath  Chest pain  Acutely worsening symptoms.     Risks and benefits discussed. Patient understands and agrees with the plan.     Follow up with PCP.     Chief Complaint     Chief Complaint   Patient presents with    Rash     Rash both hands, wrists, distal forearms. Redness R side of face with swelling. All areas pruritic. Pt had moved parts of a tree that had been cut down on 2/2         History of Present Illness       Rash  This is a new problem. The current episode started in the past 7 days. The affected locations include the left arm, right arm and face. The problem is mild. The rash is characterized by redness and itchiness. He was exposed to poison ivy/oak. The rash first occurred at home. Associated symptoms include itching. Pertinent negatives include no facial edema, fatigue, fever, shortness of breath or sore throat.       Review of Systems   Review of Systems   Constitutional:  Negative for fatigue and fever.   HENT:  Negative for sore throat.    Respiratory:  Negative for shortness of breath.    Skin:  Positive for itching and rash.         Current Medications       Current Outpatient Medications:     celecoxib (CeleBREX) 200 mg capsule, TAKE 1 CAPSULE BY MOUTH TWICE A DAY, Disp: 60 capsule, Rfl: 2    cholecalciferol (VITAMIN D3) 1,000 units tablet, Take 1 tablet by mouth daily, Disp: , Rfl:     coenzyme Q-10 100 MG capsule, Take 1 capsule by mouth Daily, Disp: , Rfl:     diazepam  (VALIUM) 5 mg tablet, Take 1 tablet (5 mg total) by mouth daily, Disp: 30 tablet, Rfl: 1    diclofenac sodium (VOLTAREN) 1 %, APPLY 4 GRAMS ON THE SKIN 4 TIMES A DAY. DO NOT APPLY TO BROKEN OR INFLAMED SKIN, Disp: , Rfl: 1    Glucosamine 750 MG TABS, Take 1 tablet by mouth daily, Disp: , Rfl:     losartan (COZAAR) 50 mg tablet, TAKE 1 TABLET BY MOUTH EVERY DAY, Disp: 90 tablet, Rfl: 2    Milk Thistle 1000 MG CAPS, Take 1 capsule by mouth daily, Disp: , Rfl:     Multiple Vitamins-Minerals (OSTEO COMPLEX PO), Take by mouth, Disp: , Rfl:     omeprazole (PriLOSEC) 20 mg delayed release capsule, Take 20 mg by mouth daily , Disp: , Rfl:     predniSONE 10 mg tablet, Take 4 tablets (40 mg total) by mouth daily for 5 days, THEN 2 tablets (20 mg total) daily for 5 days, THEN 1 tablet (10 mg total) daily for 5 days., Disp: 35 tablet, Rfl: 0    pregabalin (LYRICA) 150 mg capsule, Take 1 capsule (150 mg total) by mouth 2 (two) times a day, Disp: 60 capsule, Rfl: 1    PROBIOTIC PRODUCT PO, Take by mouth, Disp: , Rfl:     TURMERIC PO, Take 400 mg by mouth, Disp: , Rfl:     olopatadine (PATANOL) 0.1 % ophthalmic solution, INSTILL 1 DROP INTO BOTH EYES TWICE A DAY (Patient not taking: Reported on 2/5/2024), Disp: 15 mL, Rfl: 1    Current Allergies     Allergies as of 02/05/2024 - Reviewed 02/05/2024   Allergen Reaction Noted    Sulfamethoxazole-trimethoprim Rash 01/06/2016    Gadolinium derivatives Hives and Itching 08/22/2017    Cortisone  11/11/1998    Iodinated contrast media  02/21/2000    Hydrocortisone Rash 02/07/2017            The following portions of the patient's history were reviewed and updated as appropriate: allergies, current medications, past family history, past medical history, past social history, past surgical history and problem list.     Past Medical History:   Diagnosis Date    GERD (gastroesophageal reflux disease)     Hypertension     Renal calculi        Past Surgical History:   Procedure Laterality Date     ANTERIOR FUSION CERVICAL SPINE  25 years ago    C5, C6, C7    BRAIN SURGERY      CERVICAL FUSION      vertebral     CHOLECYSTECTOMY      GALLBLADDER SURGERY      OH COLONOSCOPY FLX DX W/COLLJ SPEC WHEN PFRMD N/A 5/1/2019    Procedure: COLONOSCOPY;  Surgeon: Adilson Schwartz MD;  Location: [facility];  Service: Gastroenterology    OH ESOPHAGOGASTRODUODENOSCOPY TRANSORAL DIAGNOSTIC N/A 5/1/2019    Procedure: ESOPHAGOGASTRODUODENOSCOPY (EGD);  Surgeon: Adilson Schwartz MD;  Location[facility];  Service: Gastroenterology    PROSTATE SURGERY         Family History   Problem Relation Age of Onset    Heart disease Mother     Stroke Mother     Cancer Father     Diabetes Sister         mellitus     Arthritis Sister     Cancer Sister     No Known Problems Brother     No Known Problems Maternal Grandmother     No Known Problems Maternal Grandfather     No Known Problems Paternal Grandmother     No Known Problems Paternal Grandfather     Arthritis Sister          Medications have been verified.        Objective   /78 (BP Location: Right arm, Patient Position: Sitting, Cuff Size: Adult)   Pulse 90   Temp 98.2 °F (36.8 °C) (Tympanic)   Resp 18   Wt 97.2 kg (214 lb 3.2 oz)   SpO2 97%   BMI 30.73 kg/m²   No LMP for male patient.       Physical Exam     Physical Exam  Constitutional:       General: He is not in acute distress.     Appearance: He is not ill-appearing.   Pulmonary:      Effort: No respiratory distress.   Skin:     Findings: Rash present.             Comments: Vesicular rash in a linear pattern   Neurological:      General: No focal deficit present.      Mental Status: He is alert and oriented to person, place, and time.   Psychiatric:         Mood and Affect: Mood normal.         Behavior: Behavior normal.               Jennifer Herrera DO

## 2024-02-20 DIAGNOSIS — M51.37 DEGENERATION OF LUMBOSACRAL INTERVERTEBRAL DISC: ICD-10-CM

## 2024-02-20 RX ORDER — PREGABALIN 150 MG/1
150 CAPSULE ORAL 2 TIMES DAILY
Qty: 60 CAPSULE | Refills: 5 | Status: SHIPPED | OUTPATIENT
Start: 2024-02-20

## 2024-02-20 NOTE — TELEPHONE ENCOUNTER
Medication:  PDMP     1 [id number] 11/16/2023 10/18/2023 Pregabalin (Capsule) 60.0 30 150 MG [facility] 1 / 1 [place]    1 [id number] 10/18/2023 10/18/2023 Pregabalin (Capsule) 60.0 30 150 MG [facility] 0 / 1 [place]    1 [id number] 08/01/2023 08/01/2023 Pregabalin (Capsule) 60.0 30 75 MG NA [facility]ASE [facility]REG[facility] Medicaid 0 / 3 [place]          Active agreement on file -No

## 2024-03-07 DIAGNOSIS — I10 ESSENTIAL HYPERTENSION: ICD-10-CM

## 2024-03-07 RX ORDER — LOSARTAN POTASSIUM 50 MG/1
TABLET ORAL
Qty: 90 TABLET | Refills: 1 | Status: SHIPPED | OUTPATIENT
Start: 2024-03-07

## 2024-03-27 ENCOUNTER — TELEPHONE (OUTPATIENT)
Age: 79
End: 2024-03-27

## 2024-04-02 ENCOUNTER — TELEPHONE (OUTPATIENT)
Dept: FAMILY MEDICINE CLINIC | Facility: CLINIC | Age: 79
End: 2024-04-02

## 2024-04-02 NOTE — TELEPHONE ENCOUNTER
Spoke w pts spouse and with Noe - an appt scheduled for 04/15/2024 with  @ 1585, pt currently in [place] - leaving to [place] -- back in [place] on 4/15/2024 - advised a message has been sent to  for advisements/recommendations - pt aware we are unable to have virtual visit for out of state - Also advised that pt should go to the closest ED / ER in [place] if condition gets any worse which they verbally understood anf will do so.     Pt asking  if there is anything how he can assist in this matter    Please advise       Pts spouse left a VM earlier - Transcribed VM :   Yes, hi. It's kind of a long story I'm going to give you My  is Ronnie Colin. His YOB: 1945 and he has been suffering with weight gain and at 20 lbs within the last few months ever since he switched Doctor. It's the message is for Doctor Brown. Doctor Stephanie switched his blood pressure medication from Amlodipine do Losartan and now he's gained 20 lbs In the last couple of months months. He has edema bloating in his hands, puffiness in his knees and his feet his. When he takes the pill in the evening his face gets flushed and puffy and the problem is we are leaving on Friday for a week. We are right now 3 1/2 hours away from the office, so we can't come in. We're leaving for [place]. We'll be back the week of the 15th and the question is if Doctor would. He was changed it because he was having dizziness, which he's now related to. It's not. It's no different and he thinks it's from drinking coffee. But we're the question is would he be willing to change him back to the amlodipine, which he took for several years without a problem without seeing him? Or would he recommend what do you order some blood work or what can we do? Because we're we're quite concerned at this point. My daughter was here for Easter weekend and she said that doesn't look well and with the weight gain and the puffiness and the space  washing and his blood pressure, he took several times today. The highest was 167 / 81, the lowest was 143 / 68 and his heart rate is between 83 and 88, which he runs on the high side. But this blood pressure is high for him. It's not Doesn't usually run that high. Yeah, it has been lately, but it normally before when he was on the amlodipine, it didn't run that high. So that's where we are. We're concerned. If you could give a call back, the number is [phone] and we're hoping that the doctor would have something he could do next week before we go on Friday to either change his medication or order blood work or both or whatever he thinks best. Again, my name is Alina Colin and it's for Noe. This is YOB: 1945. Thank you. Got

## 2024-04-03 DIAGNOSIS — I10 ESSENTIAL HYPERTENSION: Primary | ICD-10-CM

## 2024-04-03 RX ORDER — AMLODIPINE BESYLATE 5 MG/1
5 TABLET ORAL DAILY
Qty: 30 TABLET | Refills: 5 | Status: SHIPPED | OUTPATIENT
Start: 2024-04-03

## 2024-04-03 NOTE — TELEPHONE ENCOUNTER
Stop the losartan, I sent in the [facility]ine to the [facility] in [place].  I am also putting in blood work orders I want him to do before the appt.

## 2024-04-03 NOTE — TELEPHONE ENCOUNTER
Pt's spouse returned call -- pt was given Dr Brown's advisement. Will go for labs today or tomorrow

## 2024-04-15 ENCOUNTER — OFFICE VISIT (OUTPATIENT)
Dept: FAMILY MEDICINE CLINIC | Facility: CLINIC | Age: 79
End: 2024-04-15
Payer: COMMERCIAL

## 2024-04-15 VITALS
DIASTOLIC BLOOD PRESSURE: 72 MMHG | WEIGHT: 219 LBS | BODY MASS INDEX: 31.35 KG/M2 | HEIGHT: 70 IN | SYSTOLIC BLOOD PRESSURE: 132 MMHG | HEART RATE: 88 BPM | OXYGEN SATURATION: 97 %

## 2024-04-15 DIAGNOSIS — I10 ESSENTIAL HYPERTENSION: Primary | ICD-10-CM

## 2024-04-15 PROCEDURE — 99213 OFFICE O/P EST LOW 20 MIN: CPT | Performed by: FAMILY MEDICINE

## 2024-04-15 RX ORDER — TRIAMCINOLONE ACETONIDE 1 MG/G
OINTMENT TOPICAL
COMMUNITY
Start: 2024-03-08

## 2024-04-15 RX ORDER — ERYTHROMYCIN 5 MG/G
OINTMENT OPHTHALMIC
COMMUNITY
Start: 2024-01-19

## 2024-04-15 RX ORDER — CLOBETASOL PROPIONATE 0.5 MG/G
CREAM TOPICAL
COMMUNITY
Start: 2024-03-28

## 2024-04-15 NOTE — ASSESSMENT & PLAN NOTE
Doing better on the amlodipine, he states that the swelling in his legs is improving.  Continue the amlodipine, we will see him back in May for his regular appointment to check his blood pressure.

## 2024-04-15 NOTE — PROGRESS NOTES
Name: Ronnie Colin      : 1945      MRN: [id number]  Encounter Provider: Grey Brown DO  Encounter Date: 4/15/2024   Encounter department: [facility] [address]    Assessment & Plan     1. Essential hypertension  Assessment & Plan:  Doing better on the amlodipine, he states that the swelling in his legs is improving.  Continue the amlodipine, we will see him back in May for his regular appointment to check his blood pressure.             Subjective      Patient comes in today for follow-up.  He states that he is feeling better since stopping the losartan and switching back to amlodipine.  He states that he was noticing fluid retention from the losartan and swelling in his legs.  He states that this has started to resolve over the last 2 weeks since he has restarted the amlodipine.      Review of Systems   Constitutional:  Negative for chills, fatigue and fever.   HENT:  Negative for congestion, ear pain, hearing loss, postnasal drip, rhinorrhea and sore throat.    Eyes:  Negative for pain and visual disturbance.   Respiratory:  Negative for chest tightness, shortness of breath and wheezing.    Cardiovascular:  Negative for chest pain and leg swelling.   Gastrointestinal:  Negative for abdominal distention, abdominal pain, constipation, diarrhea and vomiting.   Endocrine: Negative for cold intolerance and heat intolerance.   Genitourinary:  Negative for difficulty urinating, frequency and urgency.   Musculoskeletal:  Negative for arthralgias and gait problem.   Skin:  Negative for color change.   Neurological:  Negative for dizziness, tremors, syncope, numbness and headaches.   Hematological:  Negative for adenopathy.   Psychiatric/Behavioral:  Negative for agitation, confusion and sleep disturbance. The patient is not nervous/anxious.        Current Outpatient Medications on File Prior to Visit   Medication Sig   • clobetasol (TEMOVATE) 0.05 % cream APPLY 1  "APPLICATION ON THE SKIN TWICE A DAY APPLY TO AFFECTED AREAS TWICE DAILY.   • erythromycin (ILOTYCIN) ophthalmic ointment as directed Ophthalmic at bedtime for 5 days   • mupirocin (BACTROBAN) 2 % ointment APPLY TO AFFECTED AREA TWICE DAILY FOR 1 TO 2 WEEKS   • triamcinolone (KENALOG) 0.1 % ointment APPLY 1 APPLICATION ON THE SKIN TWICE A DAY   • amLODIPine (NORVASC) 5 mg tablet Take 1 tablet (5 mg total) by mouth daily   • celecoxib (CeleBREX) 200 mg capsule TAKE 1 CAPSULE BY MOUTH TWICE A DAY   • cholecalciferol (VITAMIN D3) 1,000 units tablet Take 1 tablet by mouth daily   • coenzyme Q-10 100 MG capsule Take 1 capsule by mouth Daily   • diazepam (VALIUM) 5 mg tablet Take 1 tablet (5 mg total) by mouth daily   • diclofenac sodium (VOLTAREN) 1 % APPLY 4 GRAMS ON THE SKIN 4 TIMES A DAY. DO NOT APPLY TO BROKEN OR INFLAMED SKIN   • Glucosamine 750 MG TABS Take 1 tablet by mouth daily   • Milk Thistle 1000 MG CAPS Take 1 capsule by mouth daily   • Multiple Vitamins-Minerals (OSTEO COMPLEX PO) Take by mouth   • omeprazole (PriLOSEC) 20 mg delayed release capsule Take 20 mg by mouth daily    • pregabalin (LYRICA) 150 mg capsule Take 1 capsule (150 mg total) by mouth 2 (two) times a day   • PROBIOTIC PRODUCT PO Take by mouth   • TURMERIC PO Take 400 mg by mouth   • [DISCONTINUED] olopatadine (PATANOL) 0.1 % ophthalmic solution INSTILL 1 DROP INTO BOTH EYES TWICE A DAY (Patient not taking: Reported on 2/5/2024)       Objective     /72   Pulse 88   Ht 5' 10\" (1.778 m)   Wt 99.3 kg (219 lb)   SpO2 97%   BMI 31.42 kg/m²     Physical Exam  Vitals and nursing note reviewed.   Constitutional:       General: He is not in acute distress.     Appearance: Normal appearance. He is well-developed.   HENT:      Head: Normocephalic.   Eyes:      General: No scleral icterus.     Conjunctiva/sclera: Conjunctivae normal.   Neck:      Thyroid: No thyromegaly.   Cardiovascular:      Rate and Rhythm: Normal rate and regular rhythm. "      Heart sounds: Normal heart sounds. No murmur heard.  Pulmonary:      Effort: Pulmonary effort is normal. No respiratory distress.      Breath sounds: Normal breath sounds. No wheezing.   Abdominal:      General: Bowel sounds are normal. There is no distension.      Palpations: Abdomen is soft.      Tenderness: There is no abdominal tenderness.   Musculoskeletal:         General: No tenderness. Normal range of motion.      Cervical back: Normal range of motion.      Right lower leg: No edema.      Left lower leg: No edema.   Lymphadenopathy:      Cervical: No cervical adenopathy.   Skin:     General: Skin is warm and dry.      Coloration: Skin is not pale.      Findings: No rash.   Neurological:      Mental Status: He is alert and oriented to person, place, and time.      Cranial Nerves: No cranial nerve deficit.   Psychiatric:         Behavior: Behavior normal.       Grey Brown DO

## 2024-04-19 DIAGNOSIS — M51.37 DEGENERATION OF LUMBOSACRAL INTERVERTEBRAL DISC: ICD-10-CM

## 2024-04-19 RX ORDER — CELECOXIB 200 MG/1
200 CAPSULE ORAL 2 TIMES DAILY
Qty: 60 CAPSULE | Refills: 1 | Status: SHIPPED | OUTPATIENT
Start: 2024-04-19

## 2024-04-21 RX ORDER — PREGABALIN 150 MG/1
150 CAPSULE ORAL 2 TIMES DAILY
Qty: 60 CAPSULE | Refills: 5 | Status: SHIPPED | OUTPATIENT
Start: 2024-04-21

## 2024-05-29 ENCOUNTER — OFFICE VISIT (OUTPATIENT)
Dept: FAMILY MEDICINE CLINIC | Facility: CLINIC | Age: 79
End: 2024-05-29
Payer: COMMERCIAL

## 2024-05-29 VITALS
DIASTOLIC BLOOD PRESSURE: 62 MMHG | HEIGHT: 70 IN | SYSTOLIC BLOOD PRESSURE: 122 MMHG | BODY MASS INDEX: 31.35 KG/M2 | HEART RATE: 88 BPM | TEMPERATURE: 98.2 F | OXYGEN SATURATION: 95 % | WEIGHT: 219 LBS

## 2024-05-29 DIAGNOSIS — Z12.5 PROSTATE CANCER SCREENING: ICD-10-CM

## 2024-05-29 DIAGNOSIS — E78.00 HYPERCHOLESTEROLEMIA: ICD-10-CM

## 2024-05-29 DIAGNOSIS — R39.15 URINARY URGENCY: ICD-10-CM

## 2024-05-29 DIAGNOSIS — I10 ESSENTIAL HYPERTENSION: ICD-10-CM

## 2024-05-29 DIAGNOSIS — Z00.00 ANNUAL PHYSICAL EXAM: Primary | ICD-10-CM

## 2024-05-29 PROCEDURE — 99214 OFFICE O/P EST MOD 30 MIN: CPT | Performed by: FAMILY MEDICINE

## 2024-05-29 PROCEDURE — 99397 PER PM REEVAL EST PAT 65+ YR: CPT | Performed by: FAMILY MEDICINE

## 2024-05-29 RX ORDER — AMLODIPINE BESYLATE 2.5 MG/1
2.5 TABLET ORAL DAILY
Qty: 100 TABLET | Refills: 2 | Status: SHIPPED | OUTPATIENT
Start: 2024-05-29

## 2024-05-29 NOTE — ASSESSMENT & PLAN NOTE
Orders:    amLODIPine (NORVASC) 2.5 mg tablet; Take 1 tablet (2.5 mg total) by mouth daily  Will try to decrease the amlodipine to 2.5 mg daily to see if this helps with his fluid retention yet still manages his blood pressure.

## 2024-05-29 NOTE — PROGRESS NOTES
Adult Annual Physical  Name: Ronnie Colin      : 1945      MRN: [id number]  Encounter Provider: Grey Brown DO  Encounter Date: 2024   Encounter department: [facility] [address]    Assessment & Plan   Assessment & Plan  Annual physical exam         Essential hypertension    Orders:    amLODIPine (NORVASC) 2.5 mg tablet; Take 1 tablet (2.5 mg total) by mouth daily  Will try to decrease the amlodipine to 2.5 mg daily to see if this helps with his fluid retention yet still manages his blood pressure.  Hypercholesterolemia    Orders:    Comprehensive metabolic panel; Future    Lipid panel; Future  Continue to watch his diet, check CMP, lipid profile  Prostate cancer screening    Orders:    PSA, Total Screen; Future    Urinary urgency  Discussed with him the possibility of starting medications to help with this however he does not want to take any additional medications at this time.            Discussed risks and benefits of prostate cancer screening. We discussed the controversial history of PSA screening for prostate cancer in the [place] as well as the risk of over detection and over treatment of prostate cancer by way of PSA screening.  The patient understands that PSA blood testing is an imperfect way to screen for prostate cancer and that elevated PSA levels in the blood may also be caused by infection, inflammation, prostatic trauma or manipulation, urological procedures, or by benign prostatic enlargement.    The role of the digital rectal examination in prostate cancer screening was also discussed and I discussed with him that there is large interobserver variability in the findings of digital rectal examination.    Counseling:  Dental Health: discussed importance of regular tooth brushing, flossing, and dental visits.         History of Present Illness     Adult Annual Physical:  Patient presents for annual physical. Patient comes in today  "for checkup, he does complain of occasional urinary urgency especially when he sitting for long periods of time.  Otherwise he is feeling well..     Review of Systems   Constitutional:  Negative for chills, fatigue and fever.   HENT:  Negative for congestion, ear pain, hearing loss, postnasal drip, rhinorrhea and sore throat.    Eyes:  Negative for pain and visual disturbance.   Respiratory:  Negative for chest tightness, shortness of breath and wheezing.    Cardiovascular:  Negative for chest pain and leg swelling.   Gastrointestinal:  Negative for abdominal distention, abdominal pain, constipation, diarrhea and vomiting.   Endocrine: Negative for cold intolerance and heat intolerance.   Genitourinary:  Positive for urgency. Negative for difficulty urinating and frequency.   Musculoskeletal:  Negative for arthralgias and gait problem.   Skin:  Negative for color change.   Neurological:  Negative for dizziness, tremors, syncope, numbness and headaches.   Hematological:  Negative for adenopathy.   Psychiatric/Behavioral:  Negative for agitation, confusion and sleep disturbance. The patient is not nervous/anxious.          Objective     /62   Pulse 88   Temp 98.2 °F (36.8 °C)   Ht 5' 10\" (1.778 m)   Wt 99.3 kg (219 lb)   SpO2 95%   BMI 31.42 kg/m²     Physical Exam  Constitutional:       Appearance: He is well-developed.   HENT:      Head: Normocephalic.      Right Ear: External ear normal.      Left Ear: External ear normal.      Nose: Nose normal.   Eyes:      Extraocular Movements: Extraocular movements intact.      Conjunctiva/sclera: Conjunctivae normal.      Pupils: Pupils are equal, round, and reactive to light.   Neck:      Thyroid: No thyromegaly.   Cardiovascular:      Rate and Rhythm: Normal rate and regular rhythm.      Heart sounds: Normal heart sounds.   Pulmonary:      Effort: Pulmonary effort is normal.      Breath sounds: Normal breath sounds.   Abdominal:      General: Bowel sounds are " normal.      Palpations: Abdomen is soft.   Musculoskeletal:         General: Normal range of motion.      Cervical back: Normal range of motion.   Skin:     General: Skin is warm and dry.   Neurological:      Mental Status: He is alert and oriented to person, place, and time.   Psychiatric:         Mood and Affect: Mood normal.         Behavior: Behavior normal.       Administrative Statements

## 2024-05-29 NOTE — ASSESSMENT & PLAN NOTE
Orders:    Comprehensive metabolic panel; Future    Lipid panel; Future  Continue to watch his diet, check CMP, lipid profile

## 2024-06-18 DIAGNOSIS — M51.37 DEGENERATION OF LUMBOSACRAL INTERVERTEBRAL DISC: ICD-10-CM

## 2024-06-18 RX ORDER — CELECOXIB 200 MG/1
200 CAPSULE ORAL 2 TIMES DAILY
Qty: 60 CAPSULE | Refills: 5 | Status: SHIPPED | OUTPATIENT
Start: 2024-06-18

## 2024-06-18 NOTE — TELEPHONE ENCOUNTER
Reason for call:   [x] Refill   [] Prior Auth  [] Other:     Office:   [x] PCP/Provider -Grey Brown/ [facility] [address]       [] Specialty/Provider -     Medication: Celebrex    Dose/Frequency: 200 mg     Quantity: #60    Pharmacy: [facility]    Does the patient have enough for 3 days?   [] Yes   [x] No - Send as HP to POD

## 2024-09-30 ENCOUNTER — TELEPHONE (OUTPATIENT)
Dept: FAMILY MEDICINE CLINIC | Facility: CLINIC | Age: 79
End: 2024-09-30

## 2024-10-01 NOTE — TELEPHONE ENCOUNTER
PA for celecoxib (CeleBREX) 200 mg capsule CANCELLED due to     []Approval on file-dates approved   [x]Medication already on Formulary - Too soon to fill  []Brand Name Preferred  []Patient no longer covered by insurance    Patient advised by     []My Chart Message  [x]Too soon to fill    Message sent to office clinical pool   Yes    Scanned into Media  No, snip below of details

## 2024-10-01 NOTE — TELEPHONE ENCOUNTER
PA for celecoxib (CeleBREX) 200 mg capsule SUBMITTED     via    [x]CMM-KEY: [id number]  []Surescripts-Case ID #   []Availity-Auth ID # NDC #   []Faxed to plan   []Other website   []Phone call Case ID #     Office notes sent, clinical questions answered. Awaiting determination    Turnaround time for your insurance to make a decision on your Prior Authorization can take 7-21 business days.

## 2024-10-19 DIAGNOSIS — M51.379 DEGENERATION OF LUMBOSACRAL INTERVERTEBRAL DISC: ICD-10-CM

## 2024-10-20 NOTE — TELEPHONE ENCOUNTER
Medication Refill Request     Name pregabalin (LYRICA) 150 mg capsule    Dose/Frequency Take 1 capsule (150 mg total) by mouth 2 (two) times a day   Quantity 60 capsule    Verified pharmacy   [x]  Verified ordering Provider   [x]  Does patient have enough for the next 3 days? Yes [x] No []

## 2024-10-21 RX ORDER — PREGABALIN 150 MG/1
150 CAPSULE ORAL 2 TIMES DAILY
Qty: 60 CAPSULE | Refills: 5 | Status: SHIPPED | OUTPATIENT
Start: 2024-10-21

## 2024-11-13 ENCOUNTER — OFFICE VISIT (OUTPATIENT)
Dept: FAMILY MEDICINE CLINIC | Facility: CLINIC | Age: 79
End: 2024-11-13
Payer: COMMERCIAL

## 2024-11-13 VITALS
OXYGEN SATURATION: 96 % | BODY MASS INDEX: 30.64 KG/M2 | DIASTOLIC BLOOD PRESSURE: 76 MMHG | HEIGHT: 70 IN | TEMPERATURE: 97.8 F | SYSTOLIC BLOOD PRESSURE: 124 MMHG | HEART RATE: 84 BPM | WEIGHT: 214 LBS

## 2024-11-13 DIAGNOSIS — J30.89 NON-SEASONAL ALLERGIC RHINITIS, UNSPECIFIED TRIGGER: ICD-10-CM

## 2024-11-13 DIAGNOSIS — E78.00 HYPERCHOLESTEROLEMIA: ICD-10-CM

## 2024-11-13 DIAGNOSIS — I10 ESSENTIAL HYPERTENSION: Primary | ICD-10-CM

## 2024-11-13 DIAGNOSIS — M51.370 DEGENERATION OF INTERVERTEBRAL DISC OF LUMBOSACRAL REGION WITH DISCOGENIC BACK PAIN: ICD-10-CM

## 2024-11-13 PROCEDURE — 99214 OFFICE O/P EST MOD 30 MIN: CPT | Performed by: FAMILY MEDICINE

## 2024-11-13 RX ORDER — AZELASTINE 1 MG/ML
2 SPRAY, METERED NASAL DAILY
Qty: 30 ML | Refills: 3 | Status: SHIPPED | OUTPATIENT
Start: 2024-11-13

## 2024-11-13 NOTE — ASSESSMENT & PLAN NOTE
Discontinue the Celebrex, trial of Relafen  Orders:    nabumetone (RELAFEN) 750 mg tablet; Take 1 tablet (750 mg total) by mouth 2 (two) times a day

## 2024-11-13 NOTE — PROGRESS NOTES
Name: Ronnie Colin      : 1945      MRN: [id number]  Encounter Provider: Grey Brown DO  Encounter Date: 2024   Encounter department: [facility] [address]      Assessment & Plan  Essential hypertension  Well-controlled, continue the amlodipine 2.5 mg       Degeneration of intervertebral disc of lumbosacral region with discogenic back pain  Discontinue the Celebrex, trial of Relafen  Orders:    nabumetone (RELAFEN) 750 mg tablet; Take 1 tablet (750 mg total) by mouth 2 (two) times a day    Hypercholesterolemia  Has been doing well off medications, check a CMP, lipid profile as previously ordered       Non-seasonal allergic rhinitis, unspecified trigger    Orders:    azelastine (ASTELIN) 0.1 % nasal spray; 2 sprays into each nostril in the morning Use in each nostril as directed         History of Present Illness     Patient comes in today for checkup, he states that he feels that the Celebrex is not helping as much as it did with his arthritis.  He also notes that he is getting more postnasal drip, cough.  He has tried over-the-counter fluticasone in the past without any relief.      Review of Systems   Constitutional:  Negative for chills, fatigue and fever.   HENT:  Positive for postnasal drip. Negative for congestion, ear pain, hearing loss, rhinorrhea and sore throat.    Eyes:  Negative for pain and visual disturbance.   Respiratory:  Negative for chest tightness, shortness of breath and wheezing.    Cardiovascular:  Negative for chest pain and leg swelling.   Gastrointestinal:  Negative for abdominal distention, abdominal pain, constipation, diarrhea and vomiting.   Endocrine: Negative for cold intolerance and heat intolerance.   Genitourinary:  Negative for difficulty urinating, frequency and urgency.   Musculoskeletal:  Positive for arthralgias. Negative for gait problem.   Skin:  Negative for color change.   Neurological:  Negative for dizziness,  "tremors, syncope, numbness and headaches.   Hematological:  Negative for adenopathy.   Psychiatric/Behavioral:  Negative for agitation, confusion and sleep disturbance. The patient is not nervous/anxious.      Objective     /76   Pulse 84   Temp 97.8 °F (36.6 °C)   Ht 5' 10\" (1.778 m)   Wt 97.1 kg (214 lb)   SpO2 96%   BMI 30.71 kg/m²     Physical Exam  Constitutional:       Appearance: He is well-developed.   HENT:      Head: Normocephalic.      Right Ear: External ear normal.      Left Ear: External ear normal.      Nose: Nose normal.   Eyes:      Extraocular Movements: Extraocular movements intact.      Conjunctiva/sclera: Conjunctivae normal.      Pupils: Pupils are equal, round, and reactive to light.   Neck:      Thyroid: No thyromegaly.   Cardiovascular:      Rate and Rhythm: Normal rate and regular rhythm.      Heart sounds: Normal heart sounds.   Pulmonary:      Effort: Pulmonary effort is normal.      Breath sounds: Normal breath sounds.   Abdominal:      General: Bowel sounds are normal.      Palpations: Abdomen is soft.   Musculoskeletal:         General: Normal range of motion.      Cervical back: Normal range of motion.   Skin:     General: Skin is warm and dry.   Neurological:      Mental Status: He is alert and oriented to person, place, and time.   Psychiatric:         Mood and Affect: Mood normal.         Behavior: Behavior normal.         Grey Brown DO     "

## 2024-12-07 DIAGNOSIS — J30.89 NON-SEASONAL ALLERGIC RHINITIS, UNSPECIFIED TRIGGER: ICD-10-CM

## 2024-12-09 RX ORDER — AZELASTINE HYDROCHLORIDE 137 UG/1
SPRAY, METERED NASAL
Qty: 30 ML | Refills: 2 | Status: SHIPPED | OUTPATIENT
Start: 2024-12-09

## 2024-12-16 DIAGNOSIS — M51.370 DEGENERATION OF INTERVERTEBRAL DISC OF LUMBOSACRAL REGION WITH DISCOGENIC BACK PAIN: ICD-10-CM

## 2024-12-16 NOTE — TELEPHONE ENCOUNTER
Medication:     nabumetone (RELAFEN) 750 mg tablet       Dose/Frequency: Take 1 tablet (750 mg total) by mouth 2 (two) times a day     Quantity: 60 tablets     Pharmacy:   [facility]/pharmacy #[id number] [facility] [address] [facility] [phone]       Office:   [x] PCP/Provider -  Grey Brown, DO   [] Speciality/Provider -     Does the patient have enough for 3 days?   [] Yes   [x] No - Send as HP to POD

## 2025-01-14 DIAGNOSIS — I10 ESSENTIAL HYPERTENSION: ICD-10-CM

## 2025-01-14 RX ORDER — AMLODIPINE BESYLATE 2.5 MG/1
2.5 TABLET ORAL DAILY
Qty: 90 TABLET | Refills: 1 | Status: SHIPPED | OUTPATIENT
Start: 2025-01-14

## 2025-01-14 NOTE — TELEPHONE ENCOUNTER
Reason for call:   [x] Refill   [] Prior Auth  [] Other:     Office:   [x] PCP/Provider -   [] Specialty/Provider -     Medication: amLODIPine (NORVASC) 2.5 mg     Dose/Frequency: Take 1 tablet (2.5 mg total) by mouth daily     Quantity: 90    Pharmacy:   [facility]/pharmacy #[id number] - [place] - [address]       Does the patient have enough for 3 days?   [] Yes   [x] No - Send as HP to POD

## 2025-03-24 ENCOUNTER — TELEPHONE (OUTPATIENT)
Age: 80
End: 2025-03-24

## 2025-03-24 DIAGNOSIS — M25.551 PAIN OF RIGHT HIP: Primary | ICD-10-CM

## 2025-03-24 RX ORDER — OXYCODONE AND ACETAMINOPHEN 5; 325 MG/1; MG/1
1 TABLET ORAL EVERY 4 HOURS PRN
Qty: 20 TABLET | Refills: 0 | Status: SHIPPED | OUTPATIENT
Start: 2025-03-24

## 2025-03-24 NOTE — TELEPHONE ENCOUNTER
Patient was seen in the ER in [place].  His wife states that it was due to left leg swelling and hip pain.  An ultrasound of his leg was done and no blood clot was found and a diagnosis of a sprain was given.  Home nursing care and physical therapy has been ordered.  At the hospital they also gave him a prescription for oxycodone 5 mg every 6 hours as needed for pain if he ran out and needed more he is to contact his provider.  Patients wife states the oxycodone is really helping patient with the pain.  Unable to make an appointment due to him being out of town. From [place], they are supposed to be going to [place] on April 10th.    Patient is requesting an order for oxycodone 5mg every six hours as needed for pain.  The medication is to be sent to [id number].    Please advise, thank you.

## 2025-03-25 ENCOUNTER — TELEPHONE (OUTPATIENT)
Age: 80
End: 2025-03-25

## 2025-03-25 NOTE — TELEPHONE ENCOUNTER
Wife called stating she scheduled an appointment for patient at [facility] due to his mouth sores he has had for awhile.   They are requesting a referral from PCP to be emailed to them.  Pt is scheduled with Dr. Harley on 8/7/25 at 10 am.     Please email referral to [email or web address]

## 2025-03-26 DIAGNOSIS — K13.79 MOUTH SORES: Primary | ICD-10-CM

## 2025-03-26 NOTE — TELEPHONE ENCOUNTER
Provider added to patient's care team.    Dr. Jorge Alberto Gusman, DMD, MPH  NPI: [id number]    Please advise, thank you!

## 2025-03-26 NOTE — TELEPHONE ENCOUNTER
That provider is not listed as a referral option so I printed it with a different provider you can white out and fax to them

## 2025-04-07 ENCOUNTER — TELEPHONE (OUTPATIENT)
Age: 80
End: 2025-04-07

## 2025-04-07 NOTE — TELEPHONE ENCOUNTER
Juan form [facility] call and stated that he made his last visit with the patient today. Patient is being discharged from their services.

## 2025-04-10 DIAGNOSIS — M51.370 DEGENERATION OF INTERVERTEBRAL DISC OF LUMBOSACRAL REGION WITH DISCOGENIC BACK PAIN: ICD-10-CM

## 2025-04-10 DIAGNOSIS — J30.89 NON-SEASONAL ALLERGIC RHINITIS, UNSPECIFIED TRIGGER: ICD-10-CM

## 2025-04-10 DIAGNOSIS — M51.379 DEGENERATION OF LUMBOSACRAL INTERVERTEBRAL DISC: ICD-10-CM

## 2025-04-11 RX ORDER — PREGABALIN 150 MG/1
150 CAPSULE ORAL 2 TIMES DAILY
Qty: 60 CAPSULE | Refills: 5 | Status: SHIPPED | OUTPATIENT
Start: 2025-04-11

## 2025-04-11 RX ORDER — AZELASTINE HYDROCHLORIDE 137 UG/1
SPRAY, METERED NASAL
Qty: 90 ML | Refills: 1 | Status: SHIPPED | OUTPATIENT
Start: 2025-04-11

## 2025-04-22 DIAGNOSIS — M51.370 DEGENERATION OF INTERVERTEBRAL DISC OF LUMBOSACRAL REGION WITH DISCOGENIC BACK PAIN: Primary | ICD-10-CM

## 2025-04-22 RX ORDER — PREGABALIN 150 MG/1
150 CAPSULE ORAL 2 TIMES DAILY
Qty: 60 CAPSULE | Refills: 0 | Status: SHIPPED | OUTPATIENT
Start: 2025-04-22

## 2025-04-22 NOTE — TELEPHONE ENCOUNTER
NOT A DUPLICATE: OUT OF STATE REQUEST  Patient's wife states they are still currently in [place] and are just requesting a 30 day supply sent to local [place] pharmacy.     Reason for call:   [x] Refill   [] Prior Auth  [] Other:     Office:   [x] PCP/Provider -   [] Specialty/Provider -     Medication: pregabalin (LYRICA) 150 mg     Dose/Frequency: TAKE 1 CAPSULE BY MOUTH TWICE A DAY     Quantity: 60    Pharmacy: [facility]/pharmacy #[id number] [place], [place] - [address]     Local Pharmacy   Does the patient have enough for 3 days?   [x] Yes   [] No - Send as HP to POD    Mail Away Pharmacy   Does the patient have enough for 10 days?   [] Yes   [] No - Send as HP to POD

## 2025-05-20 ENCOUNTER — APPOINTMENT (OUTPATIENT)
Dept: LAB | Facility: HOSPITAL | Age: 80
End: 2025-05-20
Payer: COMMERCIAL

## 2025-05-20 DIAGNOSIS — Z12.5 PROSTATE CANCER SCREENING: ICD-10-CM

## 2025-05-20 DIAGNOSIS — E78.00 HYPERCHOLESTEROLEMIA: ICD-10-CM

## 2025-05-20 LAB
ALBUMIN SERPL BCG-MCNC: 4.2 G/DL (ref 3.5–5)
ALP SERPL-CCNC: 63 U/L (ref 34–104)
ALT SERPL W P-5'-P-CCNC: 36 U/L (ref 7–52)
ANION GAP SERPL CALCULATED.3IONS-SCNC: 6 MMOL/L (ref 4–13)
AST SERPL W P-5'-P-CCNC: 28 U/L (ref 13–39)
BILIRUB SERPL-MCNC: 0.79 MG/DL (ref 0.2–1)
BUN SERPL-MCNC: 16 MG/DL (ref 5–25)
CALCIUM SERPL-MCNC: 8.7 MG/DL (ref 8.4–10.2)
CHLORIDE SERPL-SCNC: 106 MMOL/L (ref 96–108)
CHOLEST SERPL-MCNC: 163 MG/DL (ref ?–200)
CO2 SERPL-SCNC: 29 MMOL/L (ref 21–32)
CREAT SERPL-MCNC: 0.73 MG/DL (ref 0.6–1.3)
GFR SERPL CREATININE-BSD FRML MDRD: 89 ML/MIN/1.73SQ M
GLUCOSE P FAST SERPL-MCNC: 127 MG/DL (ref 65–99)
HDLC SERPL-MCNC: 34 MG/DL
LDLC SERPL CALC-MCNC: 94 MG/DL (ref 0–100)
NONHDLC SERPL-MCNC: 129 MG/DL
POTASSIUM SERPL-SCNC: 3.7 MMOL/L (ref 3.5–5.3)
PROT SERPL-MCNC: 7 G/DL (ref 6.4–8.4)
PSA SERPL-MCNC: 0.89 NG/ML (ref 0–4)
SODIUM SERPL-SCNC: 141 MMOL/L (ref 135–147)
TRIGL SERPL-MCNC: 173 MG/DL (ref ?–150)

## 2025-05-20 PROCEDURE — G0103 PSA SCREENING: HCPCS

## 2025-05-20 PROCEDURE — 80061 LIPID PANEL: CPT

## 2025-05-20 PROCEDURE — 80053 COMPREHEN METABOLIC PANEL: CPT

## 2025-05-20 PROCEDURE — 36415 COLL VENOUS BLD VENIPUNCTURE: CPT

## 2025-05-21 ENCOUNTER — OFFICE VISIT (OUTPATIENT)
Dept: FAMILY MEDICINE CLINIC | Facility: CLINIC | Age: 80
End: 2025-05-21

## 2025-05-21 VITALS
HEIGHT: 70 IN | BODY MASS INDEX: 30.81 KG/M2 | HEART RATE: 89 BPM | DIASTOLIC BLOOD PRESSURE: 78 MMHG | SYSTOLIC BLOOD PRESSURE: 126 MMHG | OXYGEN SATURATION: 95 % | WEIGHT: 215.2 LBS

## 2025-05-21 DIAGNOSIS — E78.00 HYPERCHOLESTEROLEMIA: ICD-10-CM

## 2025-05-21 DIAGNOSIS — I10 ESSENTIAL HYPERTENSION: ICD-10-CM

## 2025-05-21 DIAGNOSIS — M48.02 CERVICAL SPINAL STENOSIS: ICD-10-CM

## 2025-05-21 DIAGNOSIS — Z12.11 SCREEN FOR COLON CANCER: Primary | ICD-10-CM

## 2025-05-21 NOTE — ASSESSMENT & PLAN NOTE
We will discontinue his amlodipine to see if this improves the swelling in his legs.  He is to monitor his blood pressure at home and call if it goes up over 140/90.

## 2025-07-22 ENCOUNTER — OFFICE VISIT (OUTPATIENT)
Age: 80
End: 2025-07-22
Payer: COMMERCIAL

## 2025-07-22 ENCOUNTER — PREP FOR PROCEDURE (OUTPATIENT)
Age: 80
End: 2025-07-22

## 2025-07-22 VITALS
DIASTOLIC BLOOD PRESSURE: 80 MMHG | HEIGHT: 70 IN | WEIGHT: 215 LBS | HEART RATE: 87 BPM | BODY MASS INDEX: 30.78 KG/M2 | SYSTOLIC BLOOD PRESSURE: 122 MMHG | OXYGEN SATURATION: 98 %

## 2025-07-22 DIAGNOSIS — K21.9 GASTRO-ESOPHAGEAL REFLUX DISEASE WITHOUT ESOPHAGITIS: ICD-10-CM

## 2025-07-22 DIAGNOSIS — Z12.11 SCREENING FOR COLON CANCER: ICD-10-CM

## 2025-07-22 DIAGNOSIS — K31.A11 INTESTINAL METAPLASIA OF ANTRUM OF STOMACH WITHOUT DYSPLASIA: ICD-10-CM

## 2025-07-22 DIAGNOSIS — Z12.11 SCREEN FOR COLON CANCER: ICD-10-CM

## 2025-07-22 DIAGNOSIS — K31.84 GASTROPARESIS: ICD-10-CM

## 2025-07-22 DIAGNOSIS — K44.9 DIAPHRAGMATIC HERNIA WITHOUT OBSTRUCTION AND WITHOUT GANGRENE: Primary | ICD-10-CM

## 2025-07-22 PROCEDURE — 99213 OFFICE O/P EST LOW 20 MIN: CPT | Performed by: INTERNAL MEDICINE

## 2025-07-22 NOTE — PATIENT INSTRUCTIONS
Scheduled date of EGD/colonoscopy (as of today): 8/11/25  Physician performing EGD/colonoscopy: Lana  Location of EGD/colonoscopy: [facility]  Desired bowel prep reviewed with patient: Miralax  Instructions reviewed with patient by: Rosalia JOHN  Clearances:

## 2025-07-22 NOTE — PROGRESS NOTES
"Name: Ronnie Colin      : 1945      MRN: [id number]  Encounter Provider: Adilsno Schwartz MD  Encounter Date: 2025   Encounter department: [facility] [place]  :  Assessment & Plan  Diaphragmatic hernia without obstruction and without gangrene    Orders:  •  EGD; Future    Gastro-esophageal reflux disease without esophagitis    Orders:  •  EGD; Future    Gastroparesis    Orders:  •  EGD; Future    Intestinal metaplasia of antrum of stomach without dysplasia    Orders:  •  EGD; Future    Screening for colon cancer    Orders:  •  Colonoscopy; Future    Patient will continue his current medical management.  He is not having any gastroparesis symptoms.  He will undergo both EGD and colonoscopy for the above issues.  Further recommendations will depend on study results    History of Present Illness   HPI  Ronnie Colin is a 79 y.o. male who presents with a history of EGD for gastroparesis symptoms.  He was diagnosed with this in .  Gastric emptying study at that time showed moderate decrease in function.  However he is not having any symptoms at this time.  He was positive for intestinal metaplasia in the past.  Most recent biopsy was negative.  He is having occasional dysphagia because of an esophageal diverticulum.  He has had diaphragmatic hernia which is currently not causing him any issue.  He has no other significant GI complaints.  Last colonoscopy was more than 10 years ago      Review of Systems   Gastrointestinal:         As per HPI   Musculoskeletal:  Positive for back pain.   All other systems reviewed and are negative.       Objective   /80   Pulse 87   Ht 5' 10\" (1.778 m)   Wt 97.5 kg (215 lb)   SpO2 98%   BMI 30.85 kg/m²      Physical Exam  Constitutional:       Appearance: Normal appearance. He is normal weight.     Eyes:      Pupils: Pupils are equal, round, and reactive to light.       Cardiovascular:      Rate and Rhythm: " Normal rate and regular rhythm.      Pulses: Normal pulses.      Heart sounds: Normal heart sounds.   Pulmonary:      Effort: Pulmonary effort is normal.      Breath sounds: Normal breath sounds.   Abdominal:      General: Abdomen is flat.      Palpations: Abdomen is soft.     Skin:     General: Skin is warm and dry.     Neurological:      General: No focal deficit present.      Mental Status: He is alert and oriented to person, place, and time.     Psychiatric:         Mood and Affect: Mood normal.         Behavior: Behavior normal.

## 2025-07-23 ENCOUNTER — TELEPHONE (OUTPATIENT)
Dept: FAMILY MEDICINE CLINIC | Facility: CLINIC | Age: 80
End: 2025-07-23

## 2025-07-23 NOTE — TELEPHONE ENCOUNTER
Request for medical records from [facility].    Faxed to [facility].    Scanned into this encounter.

## 2025-08-11 ENCOUNTER — ANESTHESIA (OUTPATIENT)
Dept: GASTROENTEROLOGY | Facility: HOSPITAL | Age: 80
End: 2025-08-11
Payer: COMMERCIAL

## 2025-08-11 ENCOUNTER — HOSPITAL ENCOUNTER (OUTPATIENT)
Dept: GASTROENTEROLOGY | Facility: HOSPITAL | Age: 80
Setting detail: OUTPATIENT SURGERY
Discharge: HOME | End: 2025-08-11
Attending: INTERNAL MEDICINE
Payer: COMMERCIAL

## 2025-08-11 ENCOUNTER — ANESTHESIA EVENT (OUTPATIENT)
Dept: GASTROENTEROLOGY | Facility: HOSPITAL | Age: 80
End: 2025-08-11
Payer: COMMERCIAL

## 2025-08-21 PROBLEM — Z12.11 SCREENING FOR COLON CANCER: Status: RESOLVED | Noted: 2025-07-22 | Resolved: 2025-08-21
